# Patient Record
Sex: MALE | Race: ASIAN | NOT HISPANIC OR LATINO | URBAN - METROPOLITAN AREA
[De-identification: names, ages, dates, MRNs, and addresses within clinical notes are randomized per-mention and may not be internally consistent; named-entity substitution may affect disease eponyms.]

---

## 2021-09-02 ENCOUNTER — INPATIENT (INPATIENT)
Facility: HOSPITAL | Age: 71
LOS: 1 days | Discharge: ANOTHER HOSPITAL | End: 2021-09-04
Attending: INTERNAL MEDICINE | Admitting: INTERNAL MEDICINE
Payer: COMMERCIAL

## 2021-09-02 VITALS
TEMPERATURE: 98 F | OXYGEN SATURATION: 100 % | HEART RATE: 78 BPM | RESPIRATION RATE: 16 BRPM | SYSTOLIC BLOOD PRESSURE: 177 MMHG | DIASTOLIC BLOOD PRESSURE: 92 MMHG | WEIGHT: 151.68 LBS

## 2021-09-02 DIAGNOSIS — I21.4 NON-ST ELEVATION (NSTEMI) MYOCARDIAL INFARCTION: ICD-10-CM

## 2021-09-02 DIAGNOSIS — R07.9 CHEST PAIN, UNSPECIFIED: ICD-10-CM

## 2021-09-02 DIAGNOSIS — Z29.9 ENCOUNTER FOR PROPHYLACTIC MEASURES, UNSPECIFIED: ICD-10-CM

## 2021-09-02 DIAGNOSIS — E11.9 TYPE 2 DIABETES MELLITUS WITHOUT COMPLICATIONS: ICD-10-CM

## 2021-09-02 DIAGNOSIS — Z98.49 CATARACT EXTRACTION STATUS, UNSPECIFIED EYE: Chronic | ICD-10-CM

## 2021-09-02 LAB
ALBUMIN SERPL ELPH-MCNC: 4.1 G/DL — SIGNIFICANT CHANGE UP (ref 3.3–5)
ALP SERPL-CCNC: 68 U/L — SIGNIFICANT CHANGE UP (ref 40–120)
ALT FLD-CCNC: 29 U/L — SIGNIFICANT CHANGE UP (ref 4–41)
ANION GAP SERPL CALC-SCNC: 14 MMOL/L — SIGNIFICANT CHANGE UP (ref 7–14)
APPEARANCE UR: CLEAR — SIGNIFICANT CHANGE UP
APTT BLD: 100.2 SEC — HIGH (ref 27–36.3)
APTT BLD: 31.1 SEC — SIGNIFICANT CHANGE UP (ref 27–36.3)
AST SERPL-CCNC: 49 U/L — HIGH (ref 4–40)
BACTERIA # UR AUTO: NEGATIVE — SIGNIFICANT CHANGE UP
BASOPHILS # BLD AUTO: 0.03 K/UL — SIGNIFICANT CHANGE UP (ref 0–0.2)
BASOPHILS NFR BLD AUTO: 0.3 % — SIGNIFICANT CHANGE UP (ref 0–2)
BILIRUB SERPL-MCNC: 1.3 MG/DL — HIGH (ref 0.2–1.2)
BILIRUB UR-MCNC: NEGATIVE — SIGNIFICANT CHANGE UP
BUN SERPL-MCNC: 15 MG/DL — SIGNIFICANT CHANGE UP (ref 7–23)
CALCIUM SERPL-MCNC: 9.2 MG/DL — SIGNIFICANT CHANGE UP (ref 8.4–10.5)
CHLORIDE SERPL-SCNC: 98 MMOL/L — SIGNIFICANT CHANGE UP (ref 98–107)
CK MB BLD-MCNC: 10.8 % — HIGH (ref 0–2.5)
CK MB BLD-MCNC: 11.4 % — HIGH (ref 0–2.5)
CK MB CFR SERPL CALC: 19.9 NG/ML — HIGH
CK MB CFR SERPL CALC: 22.5 NG/ML — HIGH
CK SERPL-CCNC: 185 U/L — SIGNIFICANT CHANGE UP (ref 30–200)
CK SERPL-CCNC: 197 U/L — SIGNIFICANT CHANGE UP (ref 30–200)
CO2 SERPL-SCNC: 23 MMOL/L — SIGNIFICANT CHANGE UP (ref 22–31)
COLOR SPEC: SIGNIFICANT CHANGE UP
CREAT SERPL-MCNC: 0.94 MG/DL — SIGNIFICANT CHANGE UP (ref 0.5–1.3)
DIFF PNL FLD: NEGATIVE — SIGNIFICANT CHANGE UP
EOSINOPHIL # BLD AUTO: 0.06 K/UL — SIGNIFICANT CHANGE UP (ref 0–0.5)
EOSINOPHIL NFR BLD AUTO: 0.6 % — SIGNIFICANT CHANGE UP (ref 0–6)
EPI CELLS # UR: 0 /HPF — SIGNIFICANT CHANGE UP (ref 0–5)
GLUCOSE SERPL-MCNC: 178 MG/DL — HIGH (ref 70–99)
GLUCOSE UR QL: NEGATIVE — SIGNIFICANT CHANGE UP
HCT VFR BLD CALC: 49.3 % — SIGNIFICANT CHANGE UP (ref 39–50)
HGB BLD-MCNC: 16.3 G/DL — SIGNIFICANT CHANGE UP (ref 13–17)
HYALINE CASTS # UR AUTO: 1 /LPF — SIGNIFICANT CHANGE UP (ref 0–7)
IANC: 4.8 K/UL — SIGNIFICANT CHANGE UP (ref 1.5–8.5)
IMM GRANULOCYTES NFR BLD AUTO: 0.3 % — SIGNIFICANT CHANGE UP (ref 0–1.5)
INR BLD: 1.03 RATIO — SIGNIFICANT CHANGE UP (ref 0.88–1.16)
KETONES UR-MCNC: NEGATIVE — SIGNIFICANT CHANGE UP
LEUKOCYTE ESTERASE UR-ACNC: NEGATIVE — SIGNIFICANT CHANGE UP
LIDOCAIN IGE QN: 35 U/L — SIGNIFICANT CHANGE UP (ref 7–60)
LYMPHOCYTES # BLD AUTO: 3.7 K/UL — HIGH (ref 1–3.3)
LYMPHOCYTES # BLD AUTO: 39.8 % — SIGNIFICANT CHANGE UP (ref 13–44)
MCHC RBC-ENTMCNC: 25.5 PG — LOW (ref 27–34)
MCHC RBC-ENTMCNC: 33.1 GM/DL — SIGNIFICANT CHANGE UP (ref 32–36)
MCV RBC AUTO: 77.3 FL — LOW (ref 80–100)
MONOCYTES # BLD AUTO: 0.67 K/UL — SIGNIFICANT CHANGE UP (ref 0–0.9)
MONOCYTES NFR BLD AUTO: 7.2 % — SIGNIFICANT CHANGE UP (ref 2–14)
NEUTROPHILS # BLD AUTO: 4.8 K/UL — SIGNIFICANT CHANGE UP (ref 1.8–7.4)
NEUTROPHILS NFR BLD AUTO: 51.8 % — SIGNIFICANT CHANGE UP (ref 43–77)
NITRITE UR-MCNC: NEGATIVE — SIGNIFICANT CHANGE UP
NRBC # BLD: 0 /100 WBCS — SIGNIFICANT CHANGE UP
NRBC # FLD: 0 K/UL — SIGNIFICANT CHANGE UP
NT-PROBNP SERPL-SCNC: 324 PG/ML — HIGH
PH UR: 6 — SIGNIFICANT CHANGE UP (ref 5–8)
PLATELET # BLD AUTO: 332 K/UL — SIGNIFICANT CHANGE UP (ref 150–400)
POTASSIUM SERPL-MCNC: 5.5 MMOL/L — HIGH (ref 3.5–5.3)
POTASSIUM SERPL-SCNC: 5.5 MMOL/L — HIGH (ref 3.5–5.3)
PROT SERPL-MCNC: 8.3 G/DL — SIGNIFICANT CHANGE UP (ref 6–8.3)
PROT UR-MCNC: ABNORMAL
PROTHROM AB SERPL-ACNC: 11.8 SEC — SIGNIFICANT CHANGE UP (ref 10.6–13.6)
RBC # BLD: 6.38 M/UL — HIGH (ref 4.2–5.8)
RBC # FLD: 14.9 % — HIGH (ref 10.3–14.5)
RBC CASTS # UR COMP ASSIST: 3 /HPF — SIGNIFICANT CHANGE UP (ref 0–4)
SARS-COV-2 RNA SPEC QL NAA+PROBE: SIGNIFICANT CHANGE UP
SODIUM SERPL-SCNC: 135 MMOL/L — SIGNIFICANT CHANGE UP (ref 135–145)
SP GR SPEC: 1.01 — SIGNIFICANT CHANGE UP (ref 1–1.05)
TROPONIN T, HIGH SENSITIVITY RESULT: 329 NG/L — CRITICAL HIGH
TROPONIN T, HIGH SENSITIVITY RESULT: 545 NG/L — CRITICAL HIGH
TROPONIN T, HIGH SENSITIVITY RESULT: 962 NG/L — CRITICAL HIGH
UROBILINOGEN FLD QL: SIGNIFICANT CHANGE UP
WBC # BLD: 9.29 K/UL — SIGNIFICANT CHANGE UP (ref 3.8–10.5)
WBC # FLD AUTO: 9.29 K/UL — SIGNIFICANT CHANGE UP (ref 3.8–10.5)
WBC UR QL: 0 /HPF — SIGNIFICANT CHANGE UP (ref 0–5)

## 2021-09-02 PROCEDURE — 99223 1ST HOSP IP/OBS HIGH 75: CPT

## 2021-09-02 PROCEDURE — 93010 ELECTROCARDIOGRAM REPORT: CPT

## 2021-09-02 PROCEDURE — 71046 X-RAY EXAM CHEST 2 VIEWS: CPT | Mod: 26

## 2021-09-02 PROCEDURE — 99291 CRITICAL CARE FIRST HOUR: CPT | Mod: 25

## 2021-09-02 RX ORDER — ASPIRIN/CALCIUM CARB/MAGNESIUM 324 MG
81 TABLET ORAL DAILY
Refills: 0 | Status: DISCONTINUED | OUTPATIENT
Start: 2021-09-03 | End: 2021-09-04

## 2021-09-02 RX ORDER — ASPIRIN/CALCIUM CARB/MAGNESIUM 324 MG
162 TABLET ORAL ONCE
Refills: 0 | Status: COMPLETED | OUTPATIENT
Start: 2021-09-02 | End: 2021-09-02

## 2021-09-02 RX ORDER — ASPIRIN/CALCIUM CARB/MAGNESIUM 324 MG
162 TABLET ORAL ONCE
Refills: 0 | Status: DISCONTINUED | OUTPATIENT
Start: 2021-09-02 | End: 2021-09-02

## 2021-09-02 RX ORDER — DEXTROSE 50 % IN WATER 50 %
12.5 SYRINGE (ML) INTRAVENOUS ONCE
Refills: 0 | Status: DISCONTINUED | OUTPATIENT
Start: 2021-09-02 | End: 2021-09-04

## 2021-09-02 RX ORDER — HEPARIN SODIUM 5000 [USP'U]/ML
600 INJECTION INTRAVENOUS; SUBCUTANEOUS
Qty: 25000 | Refills: 0 | Status: DISCONTINUED | OUTPATIENT
Start: 2021-09-02 | End: 2021-09-03

## 2021-09-02 RX ORDER — HEPARIN SODIUM 5000 [USP'U]/ML
4100 INJECTION INTRAVENOUS; SUBCUTANEOUS ONCE
Refills: 0 | Status: COMPLETED | OUTPATIENT
Start: 2021-09-02 | End: 2021-09-02

## 2021-09-02 RX ORDER — SODIUM CHLORIDE 9 MG/ML
1000 INJECTION, SOLUTION INTRAVENOUS
Refills: 0 | Status: DISCONTINUED | OUTPATIENT
Start: 2021-09-02 | End: 2021-09-04

## 2021-09-02 RX ORDER — DEXTROSE 50 % IN WATER 50 %
15 SYRINGE (ML) INTRAVENOUS ONCE
Refills: 0 | Status: DISCONTINUED | OUTPATIENT
Start: 2021-09-02 | End: 2021-09-04

## 2021-09-02 RX ORDER — ATORVASTATIN CALCIUM 80 MG/1
80 TABLET, FILM COATED ORAL AT BEDTIME
Refills: 0 | Status: DISCONTINUED | OUTPATIENT
Start: 2021-09-02 | End: 2021-09-04

## 2021-09-02 RX ORDER — HEPARIN SODIUM 5000 [USP'U]/ML
INJECTION INTRAVENOUS; SUBCUTANEOUS
Qty: 25000 | Refills: 0 | Status: DISCONTINUED | OUTPATIENT
Start: 2021-09-02 | End: 2021-09-02

## 2021-09-02 RX ORDER — DEXTROSE 50 % IN WATER 50 %
25 SYRINGE (ML) INTRAVENOUS ONCE
Refills: 0 | Status: DISCONTINUED | OUTPATIENT
Start: 2021-09-02 | End: 2021-09-04

## 2021-09-02 RX ORDER — INSULIN LISPRO 100/ML
VIAL (ML) SUBCUTANEOUS AT BEDTIME
Refills: 0 | Status: DISCONTINUED | OUTPATIENT
Start: 2021-09-02 | End: 2021-09-04

## 2021-09-02 RX ORDER — HEPARIN SODIUM 5000 [USP'U]/ML
4100 INJECTION INTRAVENOUS; SUBCUTANEOUS EVERY 6 HOURS
Refills: 0 | Status: DISCONTINUED | OUTPATIENT
Start: 2021-09-02 | End: 2021-09-03

## 2021-09-02 RX ORDER — TICAGRELOR 90 MG/1
180 TABLET ORAL ONCE
Refills: 0 | Status: COMPLETED | OUTPATIENT
Start: 2021-09-02 | End: 2021-09-02

## 2021-09-02 RX ORDER — TICAGRELOR 90 MG/1
90 TABLET ORAL EVERY 12 HOURS
Refills: 0 | Status: DISCONTINUED | OUTPATIENT
Start: 2021-09-02 | End: 2021-09-03

## 2021-09-02 RX ORDER — GLUCAGON INJECTION, SOLUTION 0.5 MG/.1ML
1 INJECTION, SOLUTION SUBCUTANEOUS ONCE
Refills: 0 | Status: DISCONTINUED | OUTPATIENT
Start: 2021-09-02 | End: 2021-09-04

## 2021-09-02 RX ORDER — INSULIN LISPRO 100/ML
VIAL (ML) SUBCUTANEOUS
Refills: 0 | Status: DISCONTINUED | OUTPATIENT
Start: 2021-09-02 | End: 2021-09-04

## 2021-09-02 RX ORDER — CARVEDILOL PHOSPHATE 80 MG/1
3.12 CAPSULE, EXTENDED RELEASE ORAL EVERY 12 HOURS
Refills: 0 | Status: DISCONTINUED | OUTPATIENT
Start: 2021-09-02 | End: 2021-09-04

## 2021-09-02 RX ADMIN — HEPARIN SODIUM 800 UNIT(S)/HR: 5000 INJECTION INTRAVENOUS; SUBCUTANEOUS at 11:35

## 2021-09-02 RX ADMIN — Medication 162 MILLIGRAM(S): at 10:12

## 2021-09-02 RX ADMIN — TICAGRELOR 90 MILLIGRAM(S): 90 TABLET ORAL at 22:02

## 2021-09-02 RX ADMIN — ATORVASTATIN CALCIUM 80 MILLIGRAM(S): 80 TABLET, FILM COATED ORAL at 22:02

## 2021-09-02 RX ADMIN — TICAGRELOR 180 MILLIGRAM(S): 90 TABLET ORAL at 11:35

## 2021-09-02 RX ADMIN — HEPARIN SODIUM 4100 UNIT(S): 5000 INJECTION INTRAVENOUS; SUBCUTANEOUS at 11:30

## 2021-09-02 RX ADMIN — HEPARIN SODIUM 600 UNIT(S)/HR: 5000 INJECTION INTRAVENOUS; SUBCUTANEOUS at 23:24

## 2021-09-02 RX ADMIN — Medication 1: at 17:59

## 2021-09-02 RX ADMIN — HEPARIN SODIUM 0 UNIT(S)/HR: 5000 INJECTION INTRAVENOUS; SUBCUTANEOUS at 20:03

## 2021-09-02 RX ADMIN — CARVEDILOL PHOSPHATE 3.12 MILLIGRAM(S): 80 CAPSULE, EXTENDED RELEASE ORAL at 20:18

## 2021-09-02 RX ADMIN — Medication 162 MILLIGRAM(S): at 11:35

## 2021-09-02 NOTE — H&P ADULT - ATTENDING COMMENTS
71 year old man w/ no past medical history p/w substernal, non-radiation, pressure like chest pain not related to exertion. Patient w/o SOB or diaphoresis. Patient troponin up-trending 329-> 545, EKG w/ TWI lateral leads. S/p aspirin load. Patient also s/p brilinta and heparin ggt. Cardiology consulted for further management of NSTEMI will likely need cardiac cath. Patient admitted for NSTEMI management.     #NSTEMI   - trend troponins to peak  - c/w asa brilinta, hep ggt, statin, coreg   - obtain TTE   - cath per cards   - lipid profile, TSH, A1C       Rest of Plan as Detailed Above

## 2021-09-02 NOTE — CONSULT NOTE ADULT - ASSESSMENT
72yo M with no known PMH who presents with chest pain, found to have troponin of 329, admitted for NSTEMI.     #NSTEMI   #Hypertension - SBP 170s, repeat 150s    Plan:  -Trop 329, ECG w/ TWI in lateral leads; pt now CP free  -Loaded with asa, brilinta and started heparin gtt by ED  -Start Atorvastatin 80mg now  -If HR>65, would start Coreg  -obtain ECHO; based on findings will pursue stress vs cath   -trend troponin to peak (add on CK&CKMB as well)  -risk stratify with lipid panel, hemoglobin a1c, TSH  -admit to telemetry     Sendy Ng MD  Cardiology Fellow - PGY 4  For all New Consults and Questions:  [email or web address]   Login: [email or web address] 70yo M with no known PMH who presents with chest pain, found to have troponin of 329, admitted for NSTEMI.     #NSTEMI   #Hypertension - SBP 170s, repeat 150s    Plan:  -Trop 329, ECG w/ TWI in lateral leads; pt now CP free  -Loaded with asa, brilinta and started heparin gtt by ED  -Start Atorvastatin 80mg now; continue asa 81mg qd + brilinta 90mg BID  -If HR>65, would start Coreg  -obtain ECHO; based on findings will pursue stress vs cath   -trend troponin to peak (add on CK&CKMB as well)  -risk stratify with lipid panel, hemoglobin a1c, TSH  -admit to telemetry     Sendy Ng MD  Cardiology Fellow - PGY 4  For all New Consults and Questions:  [email or web address]   Login: [email or web address] 72yo M with no known PMH who presents with chest pain, found to have troponin of 329, admitted for NSTEMI.     #NSTEMI - VALORIE 3, ABILIO 101   #Hypertension - SBP 170s, repeat 150s    Plan:  -Trop 329, ECG w/ TWI in lateral leads; pt now CP free  -Loaded with asa, brilinta and started heparin gtt by ED  -Start Atorvastatin 80mg now; continue asa 81mg qd + brilinta 90mg BID  -If HR>65, would start Coreg  -obtain ECHO; based on findings will pursue stress vs cath   -trend troponin to peak (add on CK&CKMB as well)  -risk stratify with lipid panel, hemoglobin a1c, TSH  -admit to telemetry     Sendy Ng MD  Cardiology Fellow - PGY 4  For all New Consults and Questions:  [email or web address]   Login: [email or web address] 72yo M with no known PMH who presents with chest pain, found to have troponin of 329, admitted for NSTEMI.     #NSTEMI - VALORIE 3, ABILIO 101   #Hypertension - SBP 170s, repeat 150s    Plan:  -Trop 329, ECG w/ TWI in lateral leads; pt now CP free  -Loaded with asa, brilinta and started heparin gtt by ED  -Start Atorvastatin 80mg now; continue asa 81mg qd + brilinta 90mg BID  -If HR>65, would start Coreg  -obtain ECHO  -plan for cath  -trend troponin to peak (add on CK&CKMB as well)  -risk stratify with lipid panel, hemoglobin a1c, TSH  -admit to telemetry     Sendy Ng MD  Cardiology Fellow - PGY 4  For all New Consults and Questions:  [email or web address]   Login: [email or web address]

## 2021-09-02 NOTE — ED PROVIDER NOTE - PROGRESS NOTE DETAILS
Alejandro Kim, DO PGY-2: Pt found to have elevated trop 320, started heparin, brilinta, aspirin, as per cardiology 4 hour repeat trop, ck, ckmb, admit to medicine.

## 2021-09-02 NOTE — H&P ADULT - ASSESSMENT
70 y/o male w/ no reported pmhx presents w/ 2 episodes of unprovoked chest pain since this morning. Patient being admitted for suspected NSTEMI. 72 y/o male w/ no reported pmhx presents w/ 2 episodes of unprovoked chest pain since this morning found to have elevated cardiac enzymes with abnormal EKG, admitted to tele for NSTEMI.

## 2021-09-02 NOTE — H&P ADULT - PROBLEM SELECTOR PLAN 2
- Pt already on heparin, ASA, brilinta.  - Educated pt on importance of going to PCP regularly. Daily glucose monitoring  insulin sliding scale  DASH 1800 ADA diet   serum HgA1C  -diabetic education

## 2021-09-02 NOTE — ED PROVIDER NOTE - PHYSICAL EXAMINATION
VITALS:   T(C): 36.6 (09-02-21 @ 09:46), Max: 36.6 (09-02-21 @ 09:46)  HR: 74 (09-02-21 @ 09:46) (74 - 78)  BP: 174/93 (09-02-21 @ 09:46) (174/93 - 177/92)  RR: 17 (09-02-21 @ 09:46) (16 - 17)  SpO2: 100% (09-02-21 @ 09:46) (100% - 100%)    GENERAL: NAD, lying in bed comfortably  HEAD:  Atraumatic, normocephalic  EYES: EOMI, PERRLA, conjunctiva and sclera clear  ENT: Moist mucous membranes  NECK: Supple, no JVD, no carotid bruit  HEART: Regular rate and rhythm, no murmurs, rubs, or gallops  LUNGS: Unlabored respirations. Clear to auscultation bilaterally, no crackles, wheezing, or rhonchi  ABDOMEN: Soft, nontender, nondistended, +BS  EXTREMITIES: 2+ peripheral pulses bilaterally. No clubbing, cyanosis, or edema  NERVOUS SYSTEM:  A&Ox3, no focal deficits   SKIN: No rashes or lesions Alejandro Kim,  PGY-2:   CONSTITUTIONAL: well-appearing, in NAD  SKIN: Warm dry, normal skin turgor  HEAD: NCAT  NECK: Supple; non tender. Full ROM.  CARD: irregularly irregular, no murmurs.  RESP: clear to ausculation b/l. No crackles or wheezing.  ABD: soft, non-tender, non-distended, no rebound or guarding.  EXT: Full ROM, no bony tenderness, no pedal edema, no calf tenderness  PSYCH: Cooperative, appropriate.

## 2021-09-02 NOTE — ED PROVIDER NOTE - NS ED ROS FT
REVIEW OF SYSTEMS:    CONSTITUTIONAL: No weakness, fevers or chills  EYES/ENT: No visual changes;  No vertigo   NECK: No pain   RESPIRATORY: No cough, wheezing; No shortness of breath  CARDIOVASCULAR: No chest pain or palpitations  GASTROINTESTINAL: No abdominal or epigastric pain. No nausea, vomiting; No diarrhea or constipation.  GENITOURINARY: No dysuria, frequency or hematuria  NEUROLOGICAL: No numbness or weakness  SKIN: No itching, rashes

## 2021-09-02 NOTE — H&P ADULT - HISTORY OF PRESENT ILLNESS
72 y/o male w/ no reported pmhx presents w/ 2 episodes of unprovoked chest pain since this morning. Pt states that he woke up to go to work and started to experience a numb, dull substernal pain that was not constant and rated a 4-5/10. Describes it as having to burb but cannot. Pt called his niece who took his BP, which he stated was abnormal, and his niece advised him to come to the hospital. Pt is not currently in any pain. Denies any HA, LOC, fever, chills, diaphoresis, weight changes, cough, SOB, palpitations, n/v/d/c, melena, hematochezia, GERD, numbness or tingling of extremities, and any weakness in extremities.   72 y/o male w/ NO reported pmhx (hasn't seen PCP in 6 yrs) presents w/ 2 episodes of unprovoked chest pain since this morning. Pt states that he woke up to go to work and started to experience a numb, dull substernal pain that was not constant and rated a 4-5/10. Describes it as having to belch but couldn't. Chest pain last for about 20 mins and resolved spontaneously. Pt called his niece who took his BP, which he stated was "high," (pt didn't how high,) and his niece advised him to come to the hospital. Pt is not currently in any pain on admission Denies any HA, LOC, fever, chills, diaphoresis, weight changes, cough, SOB, palpitations, n/v/d/c, melena, hematochezia, GERD, numbness or tingling of extremities, and any weakness in extremities.

## 2021-09-02 NOTE — H&P ADULT - PROBLEM SELECTOR PLAN 1
- Troponin T, High Sensitivity Result: 329. Continue to trend q6h w/ CK-MB and LDH.  - EKG: NSR @84 bpm. TWI in AVL, V5, V6. Repeat EKG.  - O2 Sat 100% RA  - Consulted Cardiology. Going for cardiac catheterization.   - Continue Heparin, Aspirin, atorvastatin, Brilinta  - Start Coreg 3.125 mg PO q12h  - Consider giving pt O2 if pt complains of SOB or difficulty breathing.  - Consider SL nitro if pt complains of chest pain and SBP >90 bpm  - Order echo, lipid panel, A1c  - Admit to telemetry tele monitor   Continue to trend cardiac enzymes: #3 at 9pm  Serial EKGs  DASH 1800 ADA diet  - O2 Sat 100% RA  - Consulted Cardiology. Plan for cardiac catheterization, likely tomorrow.  - Continue Heparin drip (monitor APTT,) Aspirin 81mg, Brilinta 90mg BID  - Start Coreg 3.125 mg PO q12h and atorvastatin 80mg qhs  - Consider giving pt O2 if pt complains of SOB or difficulty breathing.  - Consider SL nitro if pt complains of chest pain and SBP >90 bpm  - Order echo, lipid panel, A1c

## 2021-09-02 NOTE — ED PROVIDER NOTE - ATTENDING CONTRIBUTION TO CARE
Pt p/w chest pain and elevated troponin- plan for admission for cadiac w/u- heparin, asa, brillinta, EKG showing TWI latera leads   Upon my evaluation, this patient had a high probability of imminent or life-threatening deterioration due to concern for NSTEMI, which required my direct attention, intervention, and personal management.  The patient has a  medical condition that impairs one or more vital organ systems.  Frequent personal assessment and adjustment of medical interventions was performed.       I have personally provided 35 minutes of critical care time exclusive of time spent on separately billable procedures. Time includes review of laboratory data, radiology results, discussion with consultants, patient and family; monitoring for potential decompensation, as well as time spent retrieving data and reviewing the chart and documenting the visit. Interventions were performed as documented above.

## 2021-09-02 NOTE — H&P ADULT - NSHPLABSRESULTS_GEN_ALL_CORE
16.3   9.29  )-----------( 332      ( 02 Sep 2021 10:13 )             49.3         135  |  98  |  15  ----------------------------<  178<H>  5.5<H>   |  23  |  0.94    Ca    9.2      02 Sep 2021 10:13    TPro  8.3  /  Alb  4.1  /  TBili  1.3<H>  /  DBili  x   /  AST  49<H>  /  ALT  29  /  AlkPhos  68        Urinalysis Basic - ( 02 Sep 2021 15:16 )    Color: Light Yellow / Appearance: Clear / S.013 / pH: x  Gluc: x / Ketone: Negative  / Bili: Negative / Urobili: <2 mg/dL   Blood: x / Protein: 30 mg/dL / Nitrite: Negative   Leuk Esterase: Negative / RBC: 3 /HPF / WBC 0 /HPF   Sq Epi: x / Non Sq Epi: 0 /HPF / Bacteria: Negative      PT/INR - ( 02 Sep 2021 11:14 )   PT: 11.8 sec;   INR: 1.03 ratio         PTT - ( 02 Sep 2021 11:14 )  PTT:31.1 sec  LIVER FUNCTIONS - ( 02 Sep 2021 10:13 )  Alb: 4.1 g/dL / Pro: 8.3 g/dL / ALK PHOS: 68 U/L / ALT: 29 U/L / AST: 49 U/L / GGT: x           CAPILLARY BLOOD GLUCOSE      Troponin T, High Sensitivity Result: 329      Chest X-ray: Examination in frontal and lateral projection fails to show evidence of any active pulmonary disease. The heart is not enlarged and there is no pleural effusion or pneumothorax. There is no acute bone pathology.      EKG: NSR @84 bpm. TWI in AVL, V5, V6 EKG: NSR @84 bpm. TWI in AVL, V5, V6  Trops: 324-->545, CKMB 22.5/               16.3   9.29  )-----------( 332      ( 02 Sep 2021 10:13 )             49.3         135  |  98  |  15  ----------------------------<  178<H>  5.5<H>   |  23  |  0.94    Ca    9.2      02 Sep 2021 10:13    TPro  8.3  /  Alb  4.1  /  TBili  1.3<H>  /  DBili  x   /  AST  49<H>  /  ALT  29  /  AlkPhos  68  02      Urinalysis Basic - ( 02 Sep 2021 15:16 )    Color: Light Yellow / Appearance: Clear / S.013 / pH: x  Gluc: x / Ketone: Negative  / Bili: Negative / Urobili: <2 mg/dL   Blood: x / Protein: 30 mg/dL / Nitrite: Negative   Leuk Esterase: Negative / RBC: 3 /HPF / WBC 0 /HPF   Sq Epi: x / Non Sq Epi: 0 /HPF / Bacteria: Negative      PT/INR - ( 02 Sep 2021 11:14 )   PT: 11.8 sec;   INR: 1.03 ratio         PTT - ( 02 Sep 2021 11:14 )  PTT:31.1 sec  LIVER FUNCTIONS - ( 02 Sep 2021 10:13 )  Alb: 4.1 g/dL / Pro: 8.3 g/dL / ALK PHOS: 68 U/L / ALT: 29 U/L / AST: 49 U/L / GGT: x           Chest X-ray: Examination in frontal and lateral projection fails to show evidence of any active pulmonary disease. The heart is not enlarged and there is no pleural effusion or pneumothorax. There is no acute bone pathology.

## 2021-09-02 NOTE — ED PROVIDER NOTE - OBJECTIVE STATEMENT
72 yo M w/ no known PMHx presenting with one episode of chest pain this morning. This morning he experienced a 30 minute episode of dull mid-sternal 4/10 chest pain, without radiation, w/o n/v, and w/o diaphoresis. He does not think anything provoked it and was not stressed or exerting himself prior to the pain. He denies chest pain after meals. He has never experienced the pain before, took no medications, but was concerned and advised by relative to come to the ER. He has not seen a doctor in over 5 years. 70 yo M w/ no known PMHx presenting with one episode of chest pain this morning. This morning he experienced a 30 minute episode of dull mid-sternal 4/10 chest pain, without radiation, w/o n/v, and w/o diaphoresis. He does not think anything provoked it and was not stressed or exerting himself prior to the pain. He denies chest pain after meals. He has never experienced the pain before, took no medications, but was concerned and advised by relative to come to the ER. He has not seen a doctor in over 5 years. Not covid vaccinated.

## 2021-09-02 NOTE — ED ADULT NURSE REASSESSMENT NOTE - NS ED NURSE REASSESS COMMENT FT1
Pt denies sob or chest pain awaiting floor transport. pt on heparin drip held at this time per heparin ACS  protocol , report endorsed over to floor nurse on 7th floor who will adjust heparin  drip after one hour.

## 2021-09-02 NOTE — H&P ADULT - NEUROLOGICAL DETAILS
alert and oriented x 3/responds to verbal commands/sensation intact/normal strength alert and oriented x 3/responds to pain/responds to verbal commands/sensation intact/normal strength

## 2021-09-02 NOTE — ED ADULT NURSE NOTE - OBJECTIVE STATEMENT
Pt awake and alert x 4 states had episode of chest pain earlier that now has subsided. Pt now denies chest pain or sob. Pt is NSR on cardiac monitor b/p elevated. Pt denies any dizziness or nausea. iv placed blood and covid swab collected. pt await dispo.

## 2021-09-02 NOTE — CONSULT NOTE ADULT - ATTENDING COMMENTS
Personally seen and examined patient  labs and vitals reviewed  agree with above assessment and plan  CP with CE and EKG concerning for NSTEMI  cont hep drip, dapt, statin  TTE pending  plan for cath  monitor on tele  will cont to follow  if cp worsens or becomes HD unstable, call cardiology stat

## 2021-09-02 NOTE — ED ADULT TRIAGE NOTE - CHIEF COMPLAINT QUOTE
pt c/o midsternal, non radiating chest pain as of last night. pt denies SOB, fever, cough, n/v. pt denies past medical hx, states he has not seen MD in awhile.

## 2021-09-02 NOTE — ED PROVIDER NOTE - CLINICAL SUMMARY MEDICAL DECISION MAKING FREE TEXT BOX
72 yo [facility] presenting w. one day of acute dull chest pain without radiation, N/V, or diaphoresis. Patient currently without chest pain. EKG showing non-specific T-wave abnormality. Given patient risk factors differential includes coronary/anginal syndrome vs costochondritis.

## 2021-09-02 NOTE — H&P ADULT - NSHPSOCIALHISTORY_GEN_ALL_CORE
Pt is a single man. Ambulates w/ no assistive devices. Describes diet as healthy. 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 Reports no alcohol use or drug use. Pt states that he used to smoke socially about 40-50 years ago but has not smoked since.

## 2021-09-03 ENCOUNTER — TRANSCRIPTION ENCOUNTER (OUTPATIENT)
Age: 71
End: 2021-09-03

## 2021-09-03 DIAGNOSIS — I10 ESSENTIAL (PRIMARY) HYPERTENSION: ICD-10-CM

## 2021-09-03 DIAGNOSIS — E11.9 TYPE 2 DIABETES MELLITUS WITHOUT COMPLICATIONS: ICD-10-CM

## 2021-09-03 DIAGNOSIS — E78.5 HYPERLIPIDEMIA, UNSPECIFIED: ICD-10-CM

## 2021-09-03 LAB
A1C WITH ESTIMATED AVERAGE GLUCOSE RESULT: 9.2 % — HIGH (ref 4–5.6)
ANION GAP SERPL CALC-SCNC: 17 MMOL/L — HIGH (ref 7–14)
APTT BLD: 46.4 SEC — HIGH (ref 27–36.3)
APTT BLD: 48.1 SEC — HIGH (ref 27–36.3)
BUN SERPL-MCNC: 13 MG/DL — SIGNIFICANT CHANGE UP (ref 7–23)
CALCIUM SERPL-MCNC: 9 MG/DL — SIGNIFICANT CHANGE UP (ref 8.4–10.5)
CHLORIDE SERPL-SCNC: 100 MMOL/L — SIGNIFICANT CHANGE UP (ref 98–107)
CHOLEST SERPL-MCNC: 165 MG/DL — SIGNIFICANT CHANGE UP
CK MB BLD-MCNC: 10.7 % — HIGH (ref 0–2.5)
CK MB CFR SERPL CALC: 15.2 NG/ML — HIGH
CK SERPL-CCNC: 142 U/L — SIGNIFICANT CHANGE UP (ref 30–200)
CO2 SERPL-SCNC: 22 MMOL/L — SIGNIFICANT CHANGE UP (ref 22–31)
COVID-19 SPIKE DOMAIN AB INTERP: NEGATIVE — SIGNIFICANT CHANGE UP
COVID-19 SPIKE DOMAIN ANTIBODY RESULT: 0.4 U/ML — SIGNIFICANT CHANGE UP
CREAT SERPL-MCNC: 0.84 MG/DL — SIGNIFICANT CHANGE UP (ref 0.5–1.3)
ESTIMATED AVERAGE GLUCOSE: 217 — SIGNIFICANT CHANGE UP
GLUCOSE BLDC GLUCOMTR-MCNC: 119 MG/DL — HIGH (ref 70–99)
GLUCOSE BLDC GLUCOMTR-MCNC: 161 MG/DL — HIGH (ref 70–99)
GLUCOSE BLDC GLUCOMTR-MCNC: 228 MG/DL — HIGH (ref 70–99)
GLUCOSE SERPL-MCNC: 175 MG/DL — HIGH (ref 70–99)
HCT VFR BLD CALC: 45.3 % — SIGNIFICANT CHANGE UP (ref 39–50)
HCT VFR BLD CALC: 45.3 % — SIGNIFICANT CHANGE UP (ref 39–50)
HCV AB S/CO SERPL IA: 0.11 S/CO — SIGNIFICANT CHANGE UP (ref 0–0.99)
HCV AB SERPL-IMP: SIGNIFICANT CHANGE UP
HDLC SERPL-MCNC: 42 MG/DL — SIGNIFICANT CHANGE UP
HGB BLD-MCNC: 15 G/DL — SIGNIFICANT CHANGE UP (ref 13–17)
HGB BLD-MCNC: 15.2 G/DL — SIGNIFICANT CHANGE UP (ref 13–17)
LIPID PNL WITH DIRECT LDL SERPL: 101 MG/DL — HIGH
MCHC RBC-ENTMCNC: 25.3 PG — LOW (ref 27–34)
MCHC RBC-ENTMCNC: 25.6 PG — LOW (ref 27–34)
MCHC RBC-ENTMCNC: 33.1 GM/DL — SIGNIFICANT CHANGE UP (ref 32–36)
MCHC RBC-ENTMCNC: 33.6 GM/DL — SIGNIFICANT CHANGE UP (ref 32–36)
MCV RBC AUTO: 76.4 FL — LOW (ref 80–100)
MCV RBC AUTO: 76.5 FL — LOW (ref 80–100)
NON HDL CHOLESTEROL: 123 MG/DL — SIGNIFICANT CHANGE UP
NRBC # BLD: 0 /100 WBCS — SIGNIFICANT CHANGE UP
NRBC # BLD: 0 /100 WBCS — SIGNIFICANT CHANGE UP
NRBC # FLD: 0 K/UL — SIGNIFICANT CHANGE UP
NRBC # FLD: 0 K/UL — SIGNIFICANT CHANGE UP
PLATELET # BLD AUTO: 314 K/UL — SIGNIFICANT CHANGE UP (ref 150–400)
PLATELET # BLD AUTO: 342 K/UL — SIGNIFICANT CHANGE UP (ref 150–400)
POTASSIUM SERPL-MCNC: 4.4 MMOL/L — SIGNIFICANT CHANGE UP (ref 3.5–5.3)
POTASSIUM SERPL-SCNC: 4.4 MMOL/L — SIGNIFICANT CHANGE UP (ref 3.5–5.3)
RBC # BLD: 5.92 M/UL — HIGH (ref 4.2–5.8)
RBC # BLD: 5.93 M/UL — HIGH (ref 4.2–5.8)
RBC # FLD: 14.6 % — HIGH (ref 10.3–14.5)
RBC # FLD: 14.6 % — HIGH (ref 10.3–14.5)
SARS-COV-2 IGG+IGM SERPL QL IA: 0.4 U/ML — SIGNIFICANT CHANGE UP
SARS-COV-2 IGG+IGM SERPL QL IA: NEGATIVE — SIGNIFICANT CHANGE UP
SODIUM SERPL-SCNC: 139 MMOL/L — SIGNIFICANT CHANGE UP (ref 135–145)
TRIGL SERPL-MCNC: 109 MG/DL — SIGNIFICANT CHANGE UP
TROPONIN T, HIGH SENSITIVITY RESULT: 740 NG/L — CRITICAL HIGH
TSH SERPL-MCNC: 4.01 UIU/ML — SIGNIFICANT CHANGE UP (ref 0.27–4.2)
WBC # BLD: 9.24 K/UL — SIGNIFICANT CHANGE UP (ref 3.8–10.5)
WBC # BLD: 9.33 K/UL — SIGNIFICANT CHANGE UP (ref 3.8–10.5)
WBC # FLD AUTO: 9.24 K/UL — SIGNIFICANT CHANGE UP (ref 3.8–10.5)
WBC # FLD AUTO: 9.33 K/UL — SIGNIFICANT CHANGE UP (ref 3.8–10.5)

## 2021-09-03 PROCEDURE — 99233 SBSQ HOSP IP/OBS HIGH 50: CPT

## 2021-09-03 PROCEDURE — 99152 MOD SED SAME PHYS/QHP 5/>YRS: CPT | Mod: 59

## 2021-09-03 PROCEDURE — 93880 EXTRACRANIAL BILAT STUDY: CPT | Mod: 26

## 2021-09-03 PROCEDURE — 99223 1ST HOSP IP/OBS HIGH 75: CPT

## 2021-09-03 PROCEDURE — 93458 L HRT ARTERY/VENTRICLE ANGIO: CPT | Mod: 26

## 2021-09-03 RX ORDER — INSULIN LISPRO 100/ML
5 VIAL (ML) SUBCUTANEOUS
Refills: 0 | Status: DISCONTINUED | OUTPATIENT
Start: 2021-09-03 | End: 2021-09-04

## 2021-09-03 RX ORDER — INSULIN GLARGINE 100 [IU]/ML
15 INJECTION, SOLUTION SUBCUTANEOUS AT BEDTIME
Refills: 0 | Status: DISCONTINUED | OUTPATIENT
Start: 2021-09-03 | End: 2021-09-04

## 2021-09-03 RX ORDER — HEPARIN SODIUM 5000 [USP'U]/ML
4000 INJECTION INTRAVENOUS; SUBCUTANEOUS EVERY 6 HOURS
Refills: 0 | Status: DISCONTINUED | OUTPATIENT
Start: 2021-09-03 | End: 2021-09-04

## 2021-09-03 RX ORDER — HEPARIN SODIUM 5000 [USP'U]/ML
600 INJECTION INTRAVENOUS; SUBCUTANEOUS
Qty: 25000 | Refills: 0 | Status: DISCONTINUED | OUTPATIENT
Start: 2021-09-03 | End: 2021-09-04

## 2021-09-03 RX ADMIN — HEPARIN SODIUM 600 UNIT(S)/HR: 5000 INJECTION INTRAVENOUS; SUBCUTANEOUS at 15:17

## 2021-09-03 RX ADMIN — INSULIN GLARGINE 15 UNIT(S): 100 INJECTION, SOLUTION SUBCUTANEOUS at 21:23

## 2021-09-03 RX ADMIN — Medication 5 UNIT(S): at 17:05

## 2021-09-03 RX ADMIN — Medication 2: at 12:08

## 2021-09-03 RX ADMIN — CARVEDILOL PHOSPHATE 3.12 MILLIGRAM(S): 80 CAPSULE, EXTENDED RELEASE ORAL at 17:04

## 2021-09-03 RX ADMIN — CARVEDILOL PHOSPHATE 3.12 MILLIGRAM(S): 80 CAPSULE, EXTENDED RELEASE ORAL at 05:33

## 2021-09-03 RX ADMIN — ATORVASTATIN CALCIUM 80 MILLIGRAM(S): 80 TABLET, FILM COATED ORAL at 21:22

## 2021-09-03 RX ADMIN — Medication 81 MILLIGRAM(S): at 11:07

## 2021-09-03 RX ADMIN — Medication 1: at 17:05

## 2021-09-03 RX ADMIN — Medication 1: at 08:11

## 2021-09-03 NOTE — CONSULT NOTE ADULT - ASSESSMENT
71 year old man with no reported PMH (hasn't seen PCP in 6 yrs) presents with NSTEMI, found to have multi-vessel disease on cath and now awaiting transfer to [facility] for CT surgery evaluation. Consult called for evaluation of new onset DM2, patient found to have A1c of 9.2%. High risk patient with high level decision making, presenting with NSTEMI and at risk of further microvascular and macrovascular complications. BG goal 100-180 mg/dL.

## 2021-09-03 NOTE — PROGRESS NOTE ADULT - PROBLEM SELECTOR PLAN 1
# 3V CAD   - Trops 545->962->740   - Cath showed severe multivessel disease   - loaded with ASA and Brilinta in ED, d/c Brilinta as will need CABG  - c/w heparin gtt   - cardiology following   - c/w Coreg 3.125 mg PO q12h and atorvastatin 80mg qhs  [ ] echo

## 2021-09-03 NOTE — DISCHARGE NOTE PROVIDER - NSDCCPCAREPLAN_GEN_ALL_CORE_FT
PRINCIPAL DISCHARGE DIAGNOSIS  Diagnosis: NSTEMI (non-ST elevation myocardial infarction)  Assessment and Plan of Treatment: You were seen and evaluated by cardiology. You had a cardiac catheterization, which showed blockages in three vessels. You will be transferred to [facility] for CABG.      SECONDARY DISCHARGE DIAGNOSES  Diagnosis: DM2 (diabetes mellitus, type 2)  Assessment and Plan of Treatment: Your A1c was 9.2. Monitor finger sticks pre-meal and bedtime, low salt, fat and carbohydrate diet, minimize glucose intake.  Exercise daily for at least 30 minutes and weight loss.  Follow up with primary care physician and endocrinologist for routine Hemoglobin A1C checks and management.  Follow up with your ophthalmologist for routine yearly vision exams.     PRINCIPAL DISCHARGE DIAGNOSIS  Diagnosis: NSTEMI (non-ST elevation myocardial infarction)  Assessment and Plan of Treatment: You were seen and evaluated by cardiology. You had a cardiac catheterization, which showed blockages in three vessels. You will be transferred to [facility] for CABG.      SECONDARY DISCHARGE DIAGNOSES  Diagnosis: DM2 (diabetes mellitus, type 2)  Assessment and Plan of Treatment: Your A1c was 9.2.   Continue your medication regimen and a consistent carbohydrate diet (Meaning eating the same amount of carbohydrates at the same time each day). Monitor blood glucose levels throughout the day before meals and at bedtime. Record blood sugars and bring to outpatient providers appointment in order to be reviewed by your doctor for management modifications. If your sugars are more than 400 or less than 70 you should contact your PCP immediately. Monitor for signs/symptoms of low blood glucose, such as, dizziness, altered mental status, or cool/clammy skin. In addition, monitor for signs/symptoms of high blood glucose, such as, feeling hot, dry, fatigued, or with increased thirst/urination. Make regular podiatry appointments in order to have feet checked for wounds and uncontrolled toe nail growth to prevent infections, as well as, appointments with an ophthalmologist to monitor your vision.

## 2021-09-03 NOTE — DISCHARGE NOTE PROVIDER - NSDCFUADDAPPT_GEN_ALL_CORE_FT
Follow up with your primary care physician for further monitoring in 1-2 weeks. Please call to arrange appointment. If you do not have a PCP, you may establish care at address above.  Follow up with cardiology within 1-2 weeks of discharge.

## 2021-09-03 NOTE — CHART NOTE - NSCHARTNOTEFT_GEN_A_CORE
Reviewed result of cardiac enzymes- troponin noted to be trending  up 329> 545> 962.   Patient seen and  assessed at bedside. Patient denies chest pain, shortness of breath and palpitation. Patient is  currently on heparin drip. Stat EKG done which showed no change rom previous EKG. Will repeat cardiac enzymes in AM.  ICU Vital Signs Last 24 Hrs  T(C): 36.4 (02 Sep 2021 20:11), Max: 37.1 (02 Sep 2021 18:22)  T(F): 97.5 (02 Sep 2021 20:11), Max: 98.8 (02 Sep 2021 18:22)  HR: 73 (02 Sep 2021 20:11) (73 - 78)  BP: 146/90 (02 Sep 2021 20:11) (138/95 - 177/92)  RR: 18 (02 Sep 2021 20:11) (16 - 18)  SpO2: 98% (02 Sep 2021 20:11) (98% - 100%)
MAEVE REIDJ 71y Male s/p cath R radial site check    Dressing is clear/dry/intact.   Site is without hematoma or bleeding.   Vital Signs Last 24 Hrs  T(C): 36.3 (03 Sep 2021 11:29), Max: 37.1 (02 Sep 2021 18:22)  T(F): 97.4 (03 Sep 2021 11:29), Max: 98.8 (02 Sep 2021 18:22)  HR: 77 (03 Sep 2021 11:29) (70 - 78)  BP: 142/70 (03 Sep 2021 11:29) (118/84 - 146/90)  BP(mean): --  RR: 17 (03 Sep 2021 11:29) (17 - 18)  SpO2: 99% (03 Sep 2021 11:29) (97% - 99%)    Pulses palpable, cap refill <2 sec.   Patient denies pain, numbness, tingling, CP, SOB. VSS.   Will continue to monitor.    Kristel Botello PA-C  Department of Medicine  Pager [phone]
Plan for cath tomorrow morning. Make patient NPO after midnight.

## 2021-09-03 NOTE — PROGRESS NOTE ADULT - ASSESSMENT
72 y/o male w/ no reported pmhx presents w/ 2 episodes of unprovoked chest pain since this morning found to have elevated cardiac enzymes with abnormal EKG, admitted to tele for NSTEMI.

## 2021-09-03 NOTE — DISCHARGE NOTE PROVIDER - NSDCMRMEDTOKEN_GEN_ALL_CORE_FT
aspirin 81 mg oral delayed release tablet: 1 tab(s) orally once a day  atorvastatin 80 mg oral tablet: 1 tab(s) orally once a day (at bedtime)  carvedilol 3.125 mg oral tablet: 1 tab(s) orally every 12 hours  heparin: 4000 unit(s) injectable every 6 hours  heparin 100 units/mL-D5% intravenous solution: 6 milliliter(s) intravenous   insulin glargine: 15 unit(s) injectable once a day (at bedtime)  insulin lispro 100 units/mL injectable solution: 5 unit(s) injectable 3 times a day (before meals)  insulin lispro 100 units/mL injectable solution:  injectable: 0 Unit(s) if Glucose 0 - 250  1 Unit(s) if Glucose 251 - 300  2 Unit(s) if Glucose 301 - 350  3 Unit(s) if Glucose 351 - 400  4 Unit(s) if Glucose Greater Than 400  insulin lispro 100 units/mL injectable solution:  injectable: 1 Unit(s) if Glucose 151 - 200  2 Unit(s) if Glucose 201 - 250  3 Unit(s) if Glucose 251 - 300  4 Unit(s) if Glucose 301 - 350  5 Unit(s) if Glucose 351 - 400  6 Unit(s) if Glucose Greater Than 400

## 2021-09-03 NOTE — DISCHARGE NOTE PROVIDER - NSFOLLOWUPCLINICS_GEN_ALL_ED_FT
[facility] [place]  Internal Medicine  [address] [place] [address]  Phone: [phone]  Fax: [phone]

## 2021-09-03 NOTE — DISCHARGE NOTE PROVIDER - CARE PROVIDERS DIRECT ADDRESSES
,[email or web address]

## 2021-09-03 NOTE — CONSULT NOTE ADULT - SUBJECTIVE AND OBJECTIVE BOX
HPI:  Patient is a 71 year old man with no reported PMH (hasn't seen PCP in 6 yrs) presents with NSTEMI, found to have multi-vessel disease on cath and now awaiting transfer to [facility] for CT surgery evaluation. Consult called for evaluation of new onset DM2, patient found to have A1c of 9.2%. Patient seen at bedside this afternoon, reports that he does not have a prior history of DM. Does not take any medications for DM. He does not have blurry vision or polydipsia, reports that he has always had polyuria but this is unchanged. He has cataracts. No known retinopathy, does not have neuropathy and no known nephropathy. Diet wise, he does admit to drinking soda (coca cola) and juice.    PAST MEDICAL & SURGICAL HISTORY:  No pertinent past medical history    History of cataract surgery      FAMILY HISTORY:  no family history of DM    Social History:  no cigarette use  no alcohol use  lives alone     Outpatient Medications:  no medications for DM    MEDICATIONS  (STANDING):  aspirin enteric coated 81 milliGRAM(s) Oral daily  atorvastatin 80 milliGRAM(s) Oral at bedtime  carvedilol 3.125 milliGRAM(s) Oral every 12 hours  dextrose 40% Gel 15 Gram(s) Oral once  dextrose 5%. 1000 milliLiter(s) (50 mL/Hr) IV Continuous <Continuous>  dextrose 5%. 1000 milliLiter(s) (100 mL/Hr) IV Continuous <Continuous>  dextrose 50% Injectable 25 Gram(s) IV Push once  dextrose 50% Injectable 12.5 Gram(s) IV Push once  dextrose 50% Injectable 25 Gram(s) IV Push once  glucagon  Injectable 1 milliGRAM(s) IntraMuscular once  heparin  Infusion. 600 Unit(s)/Hr (6 mL/Hr) IV Continuous <Continuous>  insulin lispro (ADMELOG) corrective regimen sliding scale   SubCutaneous three times a day before meals  insulin lispro (ADMELOG) corrective regimen sliding scale   SubCutaneous at bedtime    MEDICATIONS  (PRN):  heparin   Injectable 4000 Unit(s) IV Push every 6 hours PRN For aPTT less than 40      Allergies  No Known Allergies    Review of Systems:  Constitutional: No fever  Eyes: No blurry vision  Neuro: No tremors  HEENT: No pain  Cardiovascular: No chest pain, palpitations  Respiratory: No SOB, no cough  GI: No nausea, vomiting, abdominal pain  : No dysuria  Skin: no rash  Endocrine: + polyuria, no polydipsia    ALL OTHER SYSTEMS REVIEWED AND NEGATIVE    PHYSICAL EXAM:  VITALS: T(C): 36.3 (09-03-21 @ 11:29)  T(F): 97.4 (09-03-21 @ 11:29), Max: 98.8 (09-02-21 @ 18:22)  HR: 77 (09-03-21 @ 11:29) (70 - 78)  BP: 142/70 (09-03-21 @ 11:29) (118/84 - 146/90)  RR:  (17 - 18)  SpO2:  (97% - 100%)  Wt(kg): --  GENERAL: NAD, well-developed  EYES: No proptosis, anicteric  HEENT:  Atraumatic, Normocephalic  THYROID: Normal size, no palpable nodules  RESPIRATORY: Clear to auscultation bilaterally; No rales, rhonchi, wheezing  CARDIOVASCULAR: Regular rate and rhythm; No murmurs; no peripheral edema  GI: Soft, nontender, non distended, normal bowel sounds  SKIN: Dry, intact, No ulcers on feet  MUSCULOSKELETAL: Full range of motion, normal strength  PSYCH: Alert and oriented x 3, reactive affect, euthymic mood     POCT Blood Glucose.: 228 mg/dL (09-03-21 @ 11:40)  POCT Blood Glucose.: 160 mg/dL (09-03-21 @ 07:41)  POCT Blood Glucose.: 148 mg/dL (09-02-21 @ 21:59)  POCT Blood Glucose.: 175 mg/dL (09-02-21 @ 17:49)                          15.0   9.24  )-----------( 342      ( 03 Sep 2021 07:38 )             45.3       09-03    139  |  100  |  13  ----------------------------<  175<H>  4.4   |  22  |  0.84    EGFR if : 101  EGFR if non : 87    Ca    9.0      09-03    TPro  8.3  /  Alb  4.1  /  TBili  1.3<H>  /  DBili  x   /  AST  49<H>  /  ALT  29  /  AlkPhos  68  09-02      Thyroid Function Tests:  09-03 @ 07:38 TSH 4.01 FreeT4 -- T3 -- Anti TPO -- Anti Thyroglobulin Ab -- TSI --      09-03 Chol 165 Direct LDL -- LDL calculated 101<H> HDL 42 Trig 109      HbA1c 9.2%          
Patient seen and evaluated at bedside    Chief Complaint: NSTEMI    HPI:  70yo male with no known PMH (not seen physician for 5 years, no home meds) who woke up with substernal, non-radiation, pressure like chest pain. No palpitations, SOB, diaphoresis. Brought himself to the ED. Initial workup showed trop 329 with ECG showing TWI in lateral leads. Patient CP free when seen in ED and BP SBP noted to be in 170s (repeat 150s). Loaded with ASA, brilinta, and started on heparin gtt by ED.       PMHx:   No pertinent past medical history        PSHx:   No significant past surgical history        Allergies:  No Known Allergies      Home Meds: As per admission medication reconcilliation.     Current Medications:   heparin   Injectable 4100 Unit(s) IV Push every 6 hours PRN  heparin  Infusion.  Unit(s)/Hr IV Continuous <Continuous>      FAMILY HISTORY: NC      Social History: Denies tobacco, etoh, illicit drug use.     REVIEW OF SYSTEMS:  Constitutional:     [x ] negative [ ] fevers [ ] chills [ ] weight loss [ ] weight gain  HEENT:                  [x ] negative [ ] dry eyes [ ] eye irritation [ ] postnasal drip [ ] nasal congestion  CV:                         [ x] negative  [ ] chest pain [ ] orthopnea [ ] palpitations [ ] murmur  Resp:                     [x ] negative [ ] cough [ ] shortness of breath [ ] dyspnea [ ] wheezing [ ] sputum [ ]hemoptysis  GI:                          [ x] negative [ ] nausea [ ] vomiting [ ] diarrhea [ ] constipation [ ] abd pain [ ] dysphagia   :                        [ x] negative [ ] dysuria [ ] nocturia [ ] hematuria [ ] increased urinary frequency  Musculoskeletal: [x ] negative [ ] back pain [ ] myalgias [ ] arthralgias [ ] fracture  Skin:                       [ x] negative [ ] rash [ ] itch  Neurological:        [ x] negative [ ] headache [ ] dizziness [ ] syncope [ ] weakness [ ] numbness  Psychiatric:           [ x] negative [ ] anxiety [ ] depression  Endocrine:            [ x] negative [ ] diabetes [ ] thyroid problem  Heme/Lymph:      [ x] negative [ ] anemia [ ] bleeding problem  Allergic/Immune: [ x] negative [ ] itchy eyes [ ] nasal discharge [ ] hives [ ] angioedema    [ x] All other systems negative  [ ] Unable to assess ROS due to      Physical Exam:  T(F): 98 (09-02), Max: 98 (09-02)  HR: 73 (09-02) (73 - 78)  BP: 150/108 (09-02) (150/108 - 177/92)  RR: 18 (09-02)  SpO2: 100% (09-02)  General: Alert, no acute distress, appears comfortable   HEENT: No scleral icterus, EOMI, no facial dysmorphia, no external ear lesions   Cardiac: Regular rate and rhythm, no murmurs, no rubs, no gallops   Pulmonary: Clear breath sounds throughout, no wheezing, no stridor, no crackles   Abdomen: Nondistended, nontender, appears soft   Skin: no obvious rash or lesions   Extremities: no LE edema  Neurological: Moving all 4 extremities, no overt focal deficits noted   Psych: normal mood and affect     Cardiovascular Diagnostic Testing:    ECG: Personally reviewed: SR. HOWELL in v6, I.     Imaging: CXR clear    CXR: Personally reviewed    Labs: Personally reviewed                        16.3   9.29  )-----------( 332      ( 02 Sep 2021 10:13 )             49.3     09-02    135  |  98  |  15  ----------------------------<  178<H>  5.5<H>   |  23  |  0.94    Ca    9.2      02 Sep 2021 10:13    TPro  8.3  /  Alb  4.1  /  TBili  1.3<H>  /  DBili  x   /  AST  49<H>  /  ALT  29  /  AlkPhos  68  09-02    PT/INR - ( 02 Sep 2021 11:14 )   PT: 11.8 sec;   INR: 1.03 ratio         PTT - ( 02 Sep 2021 11:14 )  PTT:31.1 sec  Serum Pro-Brain Natriuretic Peptide: 324 pg/mL (09-02 @ 10:13)

## 2021-09-03 NOTE — CONSULT NOTE ADULT - PROBLEM SELECTOR RECOMMENDATION 9
- HbA1c 9.2%  - start basal bolus: Lantus 15 units qhs and Admelog 5 units before meals  - low correction scale qac and qhs  - consistent carb diet  - check FS qac and qhs   - RD consult  - will follow  - for discharge: plan to dc on oral agents (Metformin +/- SGLT2 inhibitor). He requires glucometer teaching prior to dc.

## 2021-09-03 NOTE — DISCHARGE NOTE PROVIDER - PROVIDER TOKENS
PROVIDER:[TOKEN:[[id number]],FOLLOWUP:[1 week]] PROVIDER:[TOKEN:[[id number]],FOLLOWUP:[1 week]],FREE:[LAST:[Primary Care Physician],PHONE:[(   )    -],FAX:[(   )    -],ADDRESS:[Please follow up with your Primary Care Physician in 1 week for ongoing medical management.]]

## 2021-09-03 NOTE — PROVIDER CONTACT NOTE (CRITICAL VALUE NOTIFICATION) - ASSESSMENT
Patient reports no chest pain at this time. Patient resting comfortably in bed with vital signs stable. No respiratory distress noted.

## 2021-09-03 NOTE — PROGRESS NOTE ADULT - SUBJECTIVE AND OBJECTIVE BOX
Patient is a 71y old  Male who presents with a chief complaint of Chest pain (03 Sep 2021 14:30)    Landry Davison MD   [facility]   Pager [phone]    SUBJECTIVE / OVERNIGHT EVENTS:  Patient seen and examined today.   No chest pain, no SOB     MEDICATIONS  (STANDING):  aspirin enteric coated 81 milliGRAM(s) Oral daily  atorvastatin 80 milliGRAM(s) Oral at bedtime  carvedilol 3.125 milliGRAM(s) Oral every 12 hours  dextrose 40% Gel 15 Gram(s) Oral once  dextrose 5%. 1000 milliLiter(s) (50 mL/Hr) IV Continuous <Continuous>  dextrose 5%. 1000 milliLiter(s) (100 mL/Hr) IV Continuous <Continuous>  dextrose 50% Injectable 25 Gram(s) IV Push once  dextrose 50% Injectable 12.5 Gram(s) IV Push once  dextrose 50% Injectable 25 Gram(s) IV Push once  glucagon  Injectable 1 milliGRAM(s) IntraMuscular once  heparin  Infusion. 600 Unit(s)/Hr (6 mL/Hr) IV Continuous <Continuous>  insulin glargine Injectable (LANTUS) 15 Unit(s) SubCutaneous at bedtime  insulin lispro (ADMELOG) corrective regimen sliding scale   SubCutaneous three times a day before meals  insulin lispro (ADMELOG) corrective regimen sliding scale   SubCutaneous at bedtime  insulin lispro Injectable (ADMELOG) 5 Unit(s) SubCutaneous three times a day before meals    MEDICATIONS  (PRN):  heparin   Injectable 4000 Unit(s) IV Push every 6 hours PRN For aPTT less than 40      Vital Signs Last 24 Hrs  T(C): 36.3 (03 Sep 2021 11:29), Max: 37.1 (02 Sep 2021 18:22)  T(F): 97.4 (03 Sep 2021 11:29), Max: 98.8 (02 Sep 2021 18:22)  HR: 77 (03 Sep 2021 11:29) (70 - 78)  BP: 142/70 (03 Sep 2021 11:29) (118/84 - 146/90)  BP(mean): --  RR: 17 (03 Sep 2021 11:29) (17 - 18)  SpO2: 99% (03 Sep 2021 11:29) (97% - 99%)  CAPILLARY BLOOD GLUCOSE      POCT Blood Glucose.: 228 mg/dL (03 Sep 2021 11:40)  POCT Blood Glucose.: 160 mg/dL (03 Sep 2021 07:41)  POCT Blood Glucose.: 148 mg/dL (02 Sep 2021 21:59)  POCT Blood Glucose.: 175 mg/dL (02 Sep 2021 17:49)    I&O's Summary      Constitutional: middle aged man in bed in NAD, awake and alert  EYES: normal sclera  HEENT:  NCAT, moist oral mucosa   Respiratory: no respiratory distress, Breath sounds are clear bilaterally. No wheezing, rales or rhonchi  Cardiovascular: S1 and S2, regular rate and rhythm, no murmurs. peripheral pulses palpable x4  Gastrointestinal: Soft, nontender, nondistended. +BS  Extremities: No lower extremity edema, no calf tenderness, warm to touch  Skin: No rashes, No erythema   Psych: Alert and Oriented x3, normal mood, normal affect    LABS:                        15.0   9.24  )-----------( 342      ( 03 Sep 2021 07:38 )             45.3     09-    139  |  100  |  13  ----------------------------<  175<H>  4.4   |  22  |  0.84    Ca    9.0      03 Sep 2021 07:38    TPro  8.3  /  Alb  4.1  /  TBili  1.3<H>  /  DBili  x   /  AST  49<H>  /  ALT  29  /  AlkPhos  68  09-02    PT/INR - ( 02 Sep 2021 11:14 )   PT: 11.8 sec;   INR: 1.03 ratio         PTT - ( 03 Sep 2021 07:38 )  PTT:46.4 sec  CARDIAC MARKERS ( 03 Sep 2021 07:38 )  x     / x     / 142 U/L / x     / 15.2 ng/mL  CARDIAC MARKERS ( 02 Sep 2021 21:34 )  x     / x     / 185 U/L / x     / 19.9 ng/mL  CARDIAC MARKERS ( 02 Sep 2021 15:16 )  x     / x     / 197 U/L / x     / 22.5 ng/mL      Urinalysis Basic - ( 02 Sep 2021 15:16 )    Color: Light Yellow / Appearance: Clear / S.013 / pH: x  Gluc: x / Ketone: Negative  / Bili: Negative / Urobili: <2 mg/dL   Blood: x / Protein: 30 mg/dL / Nitrite: Negative   Leuk Esterase: Negative / RBC: 3 /HPF / WBC 0 /HPF   Sq Epi: x / Non Sq Epi: 0 /HPF / Bacteria: Negative        RADIOLOGY & ADDITIONAL TESTS:    Imaging Personally Reviewed:    Consultant(s) Notes Reviewed:      Care Discussed with Consultants/Other Providers:

## 2021-09-03 NOTE — DISCHARGE NOTE PROVIDER - HOSPITAL COURSE
72 y/o male w/ no reported pmhx presents w/ 2 episodes of unprovoked chest pain since this morning found to have elevated cardiac enzymes with abnormal EKG, admitted to [facility] for NSTEMI.    NSTEMI  - Monitored on telemetry   -  Cardiac enzymes  - Serial EKGs  - DASH 1800 ADA diet  - O2 Sat 100% RA  - Seen and evaluated by cardiology  - Heparin drip (monitor APTT,) Aspirin 81mg, Brilinta 90mg BID continued   - Started Coreg 3.125 mg PO q12h and atorvastatin 80mg qhs  - 9/3/2021, sp cardiac catheterization LAD 70, RCA 95, OM 90, RRA access  - CT surgery consult for CABG recommended, plan for pt to be transferred to [facility]     DM2 (diabetes mellitus, type 2).   - Daily glucose monitoring  - insulin sliding scale  - DASH 1800 ADA diet   - A1c 9.2   -diabetic education.     Prophylactic measure.    - Pt already on heparin drip for ACS.  - Educated pt on importance of going to PCP regularly.    On_________, discussed with __________, patient is medically cleared and optimized for transfer to [facility] today. All medications were reviewed with attending, and sent to mutually agreed upon pharmacy.   70 y/o male w/ no reported pmhx presents w/ 2 episodes of unprovoked chest pain since this morning found to have elevated cardiac enzymes with abnormal EKG, admitted to [facility] for NSTEMI.    NSTEMI  - Monitored on telemetry   -  Cardiac enzymes  - Serial EKGs  - DASH 1800 ADA diet  - O2 Sat 100% RA  - Seen and evaluated by cardiology  - Heparin drip (monitor APTT,) Aspirin 81mg, Brilinta 90mg BID continued   - Started Coreg 3.125 mg PO q12h and atorvastatin 80mg qhs  - 9/3/2021, sp cardiac catheterization LAD 70, RCA 95, OM 90, RRA access  - CT surgery consult for CABG recommended, plan for pt to be transferred to [facility]     DM2 (diabetes mellitus, type 2).   - Daily glucose monitoring  - insulin sliding scale  - DASH 1800 ADA diet   - A1c 9.2   -diabetic education.     Prophylactic measure.    - Pt already on heparin drip for ACS.  - Educated pt on importance of going to PCP regularly.    Patient seen and evaluated. S/p C with CAD, triple vessel disease. Patient case reviewed and discussed with Attending Physician Dr Davison who agrees patient is stable for transfer to [facility] to be evaluated by CT Surgery for possible surgical management, with accepting physician Dr Ovalles. Patient and family aware, with consent agreed upon and obtained from the patient. All questions and concerns addressed to patient's satisfaction. Patient understands and agrees with plan of care.   72 y/o male w/ no reported pmhx presents w/ 2 episodes of unprovoked chest pain since this morning found to have elevated cardiac enzymes with abnormal EKG, admitted to [facility] for NSTEMI. Had cardiac cath that showed 3v disease and is now planned for CABG eval at [facility].     NSTEMI  - Monitored on telemetry   -  Cardiac enzymes  - Serial EKGs  - DASH 1800 ADA diet  - O2 Sat 100% RA  - Seen and evaluated by cardiology  - Heparin drip (monitor APTT,) Aspirin 81mg, Brilinta 90mg BID continued   - Started Coreg 3.125 mg PO q12h and atorvastatin 80mg qhs  - 9/3/2021, sp cardiac catheterization LAD 70, RCA 95, OM 90, RRA access  - CT surgery consult for CABG recommended, plan for pt to be transferred to [facility]     DM2 (diabetes mellitus, type 2).   - A1c 9.2   - endo consulted  - on lantus 15 unit and premeal 5 u. will likely be discharged on oral regimen   ** will need diabetic education, supplies/glucometer, prior to discharge.    Patient seen and evaluated. S/p C with CAD, triple vessel disease. Patient case reviewed and discussed with Attending Physician Dr Davison who agrees patient is stable for transfer to [facility] to be evaluated by CT Surgery for possible surgical management, with accepting physician Dr Ovalles. Patient and family aware, with consent agreed upon and obtained from the patient. All questions and concerns addressed to patient's satisfaction. Patient understands and agrees with plan of care.

## 2021-09-03 NOTE — CONSULT NOTE ADULT - PROBLEM SELECTOR RECOMMENDATION 3
-   - c/w Lipitor 80 mg qhs    Virginia Pope MD   Pager # [phone]  On evenings and weekends, please call the office at [phone] or page endocrine fellow on call. Please note that this patient may be followed by different provider tomorrow. If no answer, contact the office.

## 2021-09-03 NOTE — PROGRESS NOTE ADULT - ASSESSMENT
72yo M with no known PMH who presents with chest pain, found to have troponin of 329, admitted for NSTEMI. Cath showed severe multivessel CAD, plan for transfer to [facility] for CTS evaluation.     #Coronary artery disease - 3v disease on cath   #NSTEMI - VALORIE 3, ABILIO 101   #Hypertension - SBP 170s, repeat 150s    Plan:  -Continue ASA, hold P2Y12 for likely CABG  -Continue Atorvastatin, Coreg   -Continue heparin gtt  -Maintain on telemetry   -Pending transfer to [facility] for CTS ling Ng MD  Cardiology Fellow - PGY 4  For all New Consults and Questions:  [email or web address]   Login: [email or web address]

## 2021-09-03 NOTE — PROGRESS NOTE ADULT - SUBJECTIVE AND OBJECTIVE BOX
Patient seen and examined at bedside.    Overnight Events: No CP, SOB. Had cath this AM, showed severe multivessel CAD. Plan for transfer to [facility] for cath.     Review Of Systems: No chest pain, shortness of breath, or palpitations            Current Meds:  aspirin enteric coated 81 milliGRAM(s) Oral daily  atorvastatin 80 milliGRAM(s) Oral at bedtime  carvedilol 3.125 milliGRAM(s) Oral every 12 hours  dextrose 40% Gel 15 Gram(s) Oral once  dextrose 5%. 1000 milliLiter(s) IV Continuous <Continuous>  dextrose 5%. 1000 milliLiter(s) IV Continuous <Continuous>  dextrose 50% Injectable 25 Gram(s) IV Push once  dextrose 50% Injectable 12.5 Gram(s) IV Push once  dextrose 50% Injectable 25 Gram(s) IV Push once  glucagon  Injectable 1 milliGRAM(s) IntraMuscular once  insulin lispro (ADMELOG) corrective regimen sliding scale   SubCutaneous three times a day before meals  insulin lispro (ADMELOG) corrective regimen sliding scale   SubCutaneous at bedtime      Vitals:  T(F): 97.2 (), Max: 98.8 ()  HR: 70 () (70 - 78)  BP: 129/87 () (118/84 - 150/108)  RR: 18 ()  SpO2: 99% ()  I&O's Summary      Physical Exam:  Appearance: No acute distress; well appearing  Eyes: EOMI, no scleral icterus   HEENT: Normal oral mucosa  Cardiovascular: RRR, S1, S2, no murmurs, rubs, or gallops; no edema; no JVD  Respiratory: Clear to auscultation bilaterally, no auditory stridor   Gastrointestinal: soft, non-tender, non-distended with normal bowel sounds  Musculoskeletal: No clubbing; no joint deformity   Neurologic: Non-focal  Lymphatic: No lymphadenopathy  Psychiatry: AAOx3, mood & affect appropriate  Skin: No rashes, ecchymoses, or cyanosis                          15.0   9.24  )-----------( 342      ( 03 Sep 2021 07:38 )             45.3         139  |  100  |  13  ----------------------------<  175<H>  4.4   |  22  |  0.84    Ca    9.0      03 Sep 2021 07:38    TPro  8.3  /  Alb  4.1  /  TBili  1.3<H>  /  DBili  x   /  AST  49<H>  /  ALT  29  /  AlkPhos  68  09-02    PT/INR - ( 02 Sep 2021 11:14 )   PT: 11.8 sec;   INR: 1.03 ratio         PTT - ( 03 Sep 2021 07:38 )  PTT:46.4 sec  CARDIAC MARKERS ( 03 Sep 2021 07:38 )  740 ng/L / x     / x     / 142 U/L / x     / 15.2 ng/mL  CARDIAC MARKERS ( 02 Sep 2021 21:34 )  962 ng/L / x     / x     / 185 U/L / x     / 19.9 ng/mL  CARDIAC MARKERS ( 02 Sep 2021 15:16 )  545 ng/L / x     / x     / 197 U/L / x     / 22.5 ng/mL  CARDIAC MARKERS ( 02 Sep 2021 10:13 )  329 ng/L / x     / x     / x     / x     / x          Serum Pro-Brain Natriuretic Peptide: 324 pg/mL ( @ 10:13)    Total Cholesterol: 165  LDL: --  HDL: 42  T        New ECG(s): Personally reviewed    Cath:  CORONARY VESSELS: The coronary circulation is right dominant.  LM:   --  Mid left main: There was a discrete 40 % stenosis at a site with  no prior intervention. There was VALORIE grade 3 flow through the vessel  (brisk flow).  LAD:   --  Proximal LAD: There was a tubular 80 % stenosis at a site with  no prior intervention. There was VALORIE grade 3 flow through the vessel  (brisk flow).  CX:   --  OM1: There was a diffuse 95 % stenosis at a site withno prior  intervention. There was VALORIE grade 3 flow through the vessel (brisk flow).  RCA:   --  Mid RCA: There was a discrete 95 % stenosis at a site with no  prior intervention. There was VALORIE grade 3 flow through the vessel (brisk  flow).  COMPLICATIONS: There were no complications.  DIAGNOSTIC IMPRESSIONS: Severe Multivessel CAD  Recommend CTS for CABG

## 2021-09-03 NOTE — DISCHARGE NOTE PROVIDER - CARE PROVIDER_API CALL
Wayne Ovalles)  Thoracic and Cardiac Surgery  [address] [place] [address]  Phone: [phone]  Fax: [phone]  Follow Up Time: 1 week   Wayne Ovalles)  Thoracic and Cardiac Surgery  [address] [place] [address]  Phone: [phone]  Fax: [phone]  Follow Up Time: 1 week    Primary Care Physician,   Please follow up with your Primary Care Physician in 1 week for ongoing medical management.  Phone: (   )    -  Fax: (   )    -  Follow Up Time:

## 2021-09-03 NOTE — PROGRESS NOTE ADULT - PROBLEM SELECTOR PLAN 2
new diagnosis   A1c 9.1  start on lantus 15 unit and premeal 5 u   will need diabetic education, supplies, prior to discharge

## 2021-09-04 ENCOUNTER — TRANSCRIPTION ENCOUNTER (OUTPATIENT)
Age: 71
End: 2021-09-04

## 2021-09-04 ENCOUNTER — INPATIENT (INPATIENT)
Facility: HOSPITAL | Age: 71
LOS: 9 days | Discharge: HOME HEALTH | DRG: 235 | End: 2021-09-14
Attending: THORACIC SURGERY (CARDIOTHORACIC VASCULAR SURGERY) | Admitting: THORACIC SURGERY (CARDIOTHORACIC VASCULAR SURGERY)
Payer: COMMERCIAL

## 2021-09-04 VITALS
DIASTOLIC BLOOD PRESSURE: 92 MMHG | RESPIRATION RATE: 17 BRPM | TEMPERATURE: 97 F | SYSTOLIC BLOOD PRESSURE: 160 MMHG | HEART RATE: 72 BPM | OXYGEN SATURATION: 100 %

## 2021-09-04 VITALS — HEIGHT: 66 IN | WEIGHT: 150.58 LBS

## 2021-09-04 DIAGNOSIS — I25.10 ATHEROSCLEROTIC HEART DISEASE OF NATIVE CORONARY ARTERY WITHOUT ANGINA PECTORIS: ICD-10-CM

## 2021-09-04 DIAGNOSIS — Z98.49 CATARACT EXTRACTION STATUS, UNSPECIFIED EYE: Chronic | ICD-10-CM

## 2021-09-04 PROBLEM — Z78.9 OTHER SPECIFIED HEALTH STATUS: Chronic | Status: ACTIVE | Noted: 2021-09-02

## 2021-09-04 LAB
ALBUMIN SERPL ELPH-MCNC: 3.6 G/DL — SIGNIFICANT CHANGE UP (ref 3.3–5)
ALP SERPL-CCNC: 76 U/L — SIGNIFICANT CHANGE UP (ref 40–120)
ALT FLD-CCNC: 21 U/L — SIGNIFICANT CHANGE UP (ref 10–45)
ANION GAP SERPL CALC-SCNC: 12 MMOL/L — SIGNIFICANT CHANGE UP (ref 7–14)
ANION GAP SERPL CALC-SCNC: 14 MMOL/L — SIGNIFICANT CHANGE UP (ref 5–17)
APTT BLD: 62.9 SEC — HIGH (ref 27–36.3)
APTT BLD: 65.5 SEC — HIGH (ref 27–36.3)
APTT BLD: 66.3 SEC — HIGH (ref 27.5–35.5)
AST SERPL-CCNC: 21 U/L — SIGNIFICANT CHANGE UP (ref 10–40)
BASOPHILS # BLD AUTO: 0.04 K/UL — SIGNIFICANT CHANGE UP (ref 0–0.2)
BASOPHILS NFR BLD AUTO: 0.4 % — SIGNIFICANT CHANGE UP (ref 0–2)
BILIRUB SERPL-MCNC: 1.4 MG/DL — HIGH (ref 0.2–1.2)
BLD GP AB SCN SERPL QL: NEGATIVE — SIGNIFICANT CHANGE UP
BUN SERPL-MCNC: 14 MG/DL — SIGNIFICANT CHANGE UP (ref 7–23)
BUN SERPL-MCNC: 16 MG/DL — SIGNIFICANT CHANGE UP (ref 7–23)
CALCIUM SERPL-MCNC: 9.1 MG/DL — SIGNIFICANT CHANGE UP (ref 8.4–10.5)
CALCIUM SERPL-MCNC: 9.2 MG/DL — SIGNIFICANT CHANGE UP (ref 8.4–10.5)
CHLORIDE SERPL-SCNC: 100 MMOL/L — SIGNIFICANT CHANGE UP (ref 98–107)
CHLORIDE SERPL-SCNC: 102 MMOL/L — SIGNIFICANT CHANGE UP (ref 96–108)
CO2 SERPL-SCNC: 19 MMOL/L — LOW (ref 22–31)
CO2 SERPL-SCNC: 22 MMOL/L — SIGNIFICANT CHANGE UP (ref 22–31)
CREAT SERPL-MCNC: 1.03 MG/DL — SIGNIFICANT CHANGE UP (ref 0.5–1.3)
CREAT SERPL-MCNC: 1.15 MG/DL — SIGNIFICANT CHANGE UP (ref 0.5–1.3)
EOSINOPHIL # BLD AUTO: 0.05 K/UL — SIGNIFICANT CHANGE UP (ref 0–0.5)
EOSINOPHIL NFR BLD AUTO: 0.5 % — SIGNIFICANT CHANGE UP (ref 0–6)
GLUCOSE BLDC GLUCOMTR-MCNC: 105 MG/DL — HIGH (ref 70–99)
GLUCOSE BLDC GLUCOMTR-MCNC: 110 MG/DL — HIGH (ref 70–99)
GLUCOSE BLDC GLUCOMTR-MCNC: 111 MG/DL — HIGH (ref 70–99)
GLUCOSE BLDC GLUCOMTR-MCNC: 161 MG/DL — HIGH (ref 70–99)
GLUCOSE BLDC GLUCOMTR-MCNC: 192 MG/DL — HIGH (ref 70–99)
GLUCOSE SERPL-MCNC: 162 MG/DL — HIGH (ref 70–99)
GLUCOSE SERPL-MCNC: 298 MG/DL — HIGH (ref 70–99)
HCT VFR BLD CALC: 45.4 % — SIGNIFICANT CHANGE UP (ref 39–50)
HCT VFR BLD CALC: 47.3 % — SIGNIFICANT CHANGE UP (ref 39–50)
HGB BLD-MCNC: 15 G/DL — SIGNIFICANT CHANGE UP (ref 13–17)
HGB BLD-MCNC: 15.3 G/DL — SIGNIFICANT CHANGE UP (ref 13–17)
IMM GRANULOCYTES NFR BLD AUTO: 0.5 % — SIGNIFICANT CHANGE UP (ref 0–1.5)
INR BLD: 1.1 RATIO — SIGNIFICANT CHANGE UP (ref 0.88–1.16)
LYMPHOCYTES # BLD AUTO: 27.8 % — SIGNIFICANT CHANGE UP (ref 13–44)
LYMPHOCYTES # BLD AUTO: 3.08 K/UL — SIGNIFICANT CHANGE UP (ref 1–3.3)
MAGNESIUM SERPL-MCNC: 2.1 MG/DL — SIGNIFICANT CHANGE UP (ref 1.6–2.6)
MCHC RBC-ENTMCNC: 25 PG — LOW (ref 27–34)
MCHC RBC-ENTMCNC: 25.5 PG — LOW (ref 27–34)
MCHC RBC-ENTMCNC: 31.7 GM/DL — LOW (ref 32–36)
MCHC RBC-ENTMCNC: 33.7 GM/DL — SIGNIFICANT CHANGE UP (ref 32–36)
MCV RBC AUTO: 75.7 FL — LOW (ref 80–100)
MCV RBC AUTO: 78.7 FL — LOW (ref 80–100)
MONOCYTES # BLD AUTO: 0.9 K/UL — SIGNIFICANT CHANGE UP (ref 0–0.9)
MONOCYTES NFR BLD AUTO: 8.1 % — SIGNIFICANT CHANGE UP (ref 2–14)
NEUTROPHILS # BLD AUTO: 6.97 K/UL — SIGNIFICANT CHANGE UP (ref 1.8–7.4)
NEUTROPHILS NFR BLD AUTO: 62.7 % — SIGNIFICANT CHANGE UP (ref 43–77)
NRBC # BLD: 0 /100 WBCS — SIGNIFICANT CHANGE UP
NRBC # BLD: 0 /100 WBCS — SIGNIFICANT CHANGE UP (ref 0–0)
NRBC # FLD: 0 K/UL — SIGNIFICANT CHANGE UP
PHOSPHATE SERPL-MCNC: 3.2 MG/DL — SIGNIFICANT CHANGE UP (ref 2.5–4.5)
PLATELET # BLD AUTO: 285 K/UL — SIGNIFICANT CHANGE UP (ref 150–400)
PLATELET # BLD AUTO: 309 K/UL — SIGNIFICANT CHANGE UP (ref 150–400)
POTASSIUM SERPL-MCNC: 4.2 MMOL/L — SIGNIFICANT CHANGE UP (ref 3.5–5.3)
POTASSIUM SERPL-MCNC: 4.2 MMOL/L — SIGNIFICANT CHANGE UP (ref 3.5–5.3)
POTASSIUM SERPL-SCNC: 4.2 MMOL/L — SIGNIFICANT CHANGE UP (ref 3.5–5.3)
POTASSIUM SERPL-SCNC: 4.2 MMOL/L — SIGNIFICANT CHANGE UP (ref 3.5–5.3)
PROT SERPL-MCNC: 7.2 G/DL — SIGNIFICANT CHANGE UP (ref 6–8.3)
PROTHROM AB SERPL-ACNC: 13.1 SEC — SIGNIFICANT CHANGE UP (ref 10.6–13.6)
RBC # BLD: 6 M/UL — HIGH (ref 4.2–5.8)
RBC # BLD: 6.01 M/UL — HIGH (ref 4.2–5.8)
RBC # FLD: 14.5 % — SIGNIFICANT CHANGE UP (ref 10.3–14.5)
RBC # FLD: 14.9 % — HIGH (ref 10.3–14.5)
RH IG SCN BLD-IMP: POSITIVE — SIGNIFICANT CHANGE UP
SODIUM SERPL-SCNC: 134 MMOL/L — LOW (ref 135–145)
SODIUM SERPL-SCNC: 135 MMOL/L — SIGNIFICANT CHANGE UP (ref 135–145)
WBC # BLD: 10.24 K/UL — SIGNIFICANT CHANGE UP (ref 3.8–10.5)
WBC # BLD: 11.09 K/UL — HIGH (ref 3.8–10.5)
WBC # FLD AUTO: 10.24 K/UL — SIGNIFICANT CHANGE UP (ref 3.8–10.5)
WBC # FLD AUTO: 11.09 K/UL — HIGH (ref 3.8–10.5)

## 2021-09-04 PROCEDURE — 99232 SBSQ HOSP IP/OBS MODERATE 35: CPT

## 2021-09-04 PROCEDURE — 93010 ELECTROCARDIOGRAM REPORT: CPT

## 2021-09-04 PROCEDURE — 99239 HOSP IP/OBS DSCHRG MGMT >30: CPT

## 2021-09-04 PROCEDURE — 99223 1ST HOSP IP/OBS HIGH 75: CPT

## 2021-09-04 PROCEDURE — 93306 TTE W/DOPPLER COMPLETE: CPT | Mod: 26

## 2021-09-04 RX ORDER — INSULIN LISPRO 100/ML
0 VIAL (ML) SUBCUTANEOUS
Qty: 0 | Refills: 0 | DISCHARGE
Start: 2021-09-04

## 2021-09-04 RX ORDER — INSULIN GLARGINE 100 [IU]/ML
15 INJECTION, SOLUTION SUBCUTANEOUS
Qty: 0 | Refills: 0 | DISCHARGE
Start: 2021-09-04

## 2021-09-04 RX ORDER — SODIUM CHLORIDE 9 MG/ML
1000 INJECTION, SOLUTION INTRAVENOUS
Refills: 0 | Status: DISCONTINUED | OUTPATIENT
Start: 2021-09-04 | End: 2021-09-04

## 2021-09-04 RX ORDER — METOPROLOL TARTRATE 50 MG
25 TABLET ORAL EVERY 12 HOURS
Refills: 0 | Status: DISCONTINUED | OUTPATIENT
Start: 2021-09-04 | End: 2021-09-09

## 2021-09-04 RX ORDER — HEPARIN SODIUM 5000 [USP'U]/ML
750 INJECTION INTRAVENOUS; SUBCUTANEOUS
Qty: 25000 | Refills: 0 | Status: DISCONTINUED | OUTPATIENT
Start: 2021-09-04 | End: 2021-09-07

## 2021-09-04 RX ORDER — DEXTROSE 50 % IN WATER 50 %
25 SYRINGE (ML) INTRAVENOUS ONCE
Refills: 0 | Status: DISCONTINUED | OUTPATIENT
Start: 2021-09-04 | End: 2021-09-09

## 2021-09-04 RX ORDER — ATORVASTATIN CALCIUM 80 MG/1
80 TABLET, FILM COATED ORAL AT BEDTIME
Refills: 0 | Status: DISCONTINUED | OUTPATIENT
Start: 2021-09-04 | End: 2021-09-09

## 2021-09-04 RX ORDER — HEPARIN SODIUM 5000 [USP'U]/ML
6 INJECTION INTRAVENOUS; SUBCUTANEOUS
Qty: 0 | Refills: 0 | DISCHARGE
Start: 2021-09-04

## 2021-09-04 RX ORDER — ASPIRIN/CALCIUM CARB/MAGNESIUM 324 MG
1 TABLET ORAL
Qty: 0 | Refills: 0 | DISCHARGE
Start: 2021-09-04

## 2021-09-04 RX ORDER — INSULIN LISPRO 100/ML
VIAL (ML) SUBCUTANEOUS
Refills: 0 | Status: DISCONTINUED | OUTPATIENT
Start: 2021-09-04 | End: 2021-09-09

## 2021-09-04 RX ORDER — INSULIN LISPRO 100/ML
5 VIAL (ML) SUBCUTANEOUS
Qty: 0 | Refills: 0 | DISCHARGE
Start: 2021-09-04

## 2021-09-04 RX ORDER — INSULIN LISPRO 100/ML
5 VIAL (ML) SUBCUTANEOUS
Refills: 0 | Status: DISCONTINUED | OUTPATIENT
Start: 2021-09-04 | End: 2021-09-09

## 2021-09-04 RX ORDER — PANTOPRAZOLE SODIUM 20 MG/1
40 TABLET, DELAYED RELEASE ORAL
Refills: 0 | Status: DISCONTINUED | OUTPATIENT
Start: 2021-09-04 | End: 2021-09-09

## 2021-09-04 RX ORDER — ASPIRIN/CALCIUM CARB/MAGNESIUM 324 MG
81 TABLET ORAL DAILY
Refills: 0 | Status: DISCONTINUED | OUTPATIENT
Start: 2021-09-04 | End: 2021-09-09

## 2021-09-04 RX ORDER — DEXTROSE 50 % IN WATER 50 %
15 SYRINGE (ML) INTRAVENOUS ONCE
Refills: 0 | Status: DISCONTINUED | OUTPATIENT
Start: 2021-09-04 | End: 2021-09-09

## 2021-09-04 RX ORDER — INSULIN GLARGINE 100 [IU]/ML
15 INJECTION, SOLUTION SUBCUTANEOUS AT BEDTIME
Refills: 0 | Status: DISCONTINUED | OUTPATIENT
Start: 2021-09-04 | End: 2021-09-09

## 2021-09-04 RX ORDER — ATORVASTATIN CALCIUM 80 MG/1
1 TABLET, FILM COATED ORAL
Qty: 0 | Refills: 0 | DISCHARGE
Start: 2021-09-04

## 2021-09-04 RX ORDER — HEPARIN SODIUM 5000 [USP'U]/ML
4000 INJECTION INTRAVENOUS; SUBCUTANEOUS
Qty: 0 | Refills: 0 | DISCHARGE
Start: 2021-09-04

## 2021-09-04 RX ORDER — INFLUENZA VIRUS VACCINE 15; 15; 15; 15 UG/.5ML; UG/.5ML; UG/.5ML; UG/.5ML
0.5 SUSPENSION INTRAMUSCULAR ONCE
Refills: 0 | Status: DISCONTINUED | OUTPATIENT
Start: 2021-09-04 | End: 2021-09-09

## 2021-09-04 RX ORDER — GLUCAGON INJECTION, SOLUTION 0.5 MG/.1ML
1 INJECTION, SOLUTION SUBCUTANEOUS ONCE
Refills: 0 | Status: DISCONTINUED | OUTPATIENT
Start: 2021-09-04 | End: 2021-09-04

## 2021-09-04 RX ORDER — INSULIN LISPRO 100/ML
VIAL (ML) SUBCUTANEOUS AT BEDTIME
Refills: 0 | Status: DISCONTINUED | OUTPATIENT
Start: 2021-09-04 | End: 2021-09-09

## 2021-09-04 RX ORDER — CARVEDILOL PHOSPHATE 80 MG/1
1 CAPSULE, EXTENDED RELEASE ORAL
Qty: 0 | Refills: 0 | DISCHARGE
Start: 2021-09-04

## 2021-09-04 RX ORDER — SODIUM CHLORIDE 9 MG/ML
3 INJECTION INTRAMUSCULAR; INTRAVENOUS; SUBCUTANEOUS EVERY 8 HOURS
Refills: 0 | Status: DISCONTINUED | OUTPATIENT
Start: 2021-09-04 | End: 2021-09-09

## 2021-09-04 RX ADMIN — Medication 1: at 07:58

## 2021-09-04 RX ADMIN — CARVEDILOL PHOSPHATE 3.12 MILLIGRAM(S): 80 CAPSULE, EXTENDED RELEASE ORAL at 05:51

## 2021-09-04 RX ADMIN — Medication 25 MILLIGRAM(S): at 23:41

## 2021-09-04 RX ADMIN — HEPARIN SODIUM 750 UNIT(S)/HR: 5000 INJECTION INTRAVENOUS; SUBCUTANEOUS at 16:00

## 2021-09-04 RX ADMIN — HEPARIN SODIUM 750 UNIT(S)/HR: 5000 INJECTION INTRAVENOUS; SUBCUTANEOUS at 00:01

## 2021-09-04 RX ADMIN — HEPARIN SODIUM 750 UNIT(S)/HR: 5000 INJECTION INTRAVENOUS; SUBCUTANEOUS at 09:44

## 2021-09-04 RX ADMIN — Medication 5 UNIT(S): at 12:11

## 2021-09-04 RX ADMIN — Medication 5 UNIT(S): at 07:58

## 2021-09-04 RX ADMIN — INSULIN GLARGINE 15 UNIT(S): 100 INJECTION, SOLUTION SUBCUTANEOUS at 23:38

## 2021-09-04 RX ADMIN — ATORVASTATIN CALCIUM 80 MILLIGRAM(S): 80 TABLET, FILM COATED ORAL at 23:38

## 2021-09-04 RX ADMIN — SODIUM CHLORIDE 3 MILLILITER(S): 9 INJECTION INTRAMUSCULAR; INTRAVENOUS; SUBCUTANEOUS at 23:40

## 2021-09-04 NOTE — PROGRESS NOTE ADULT - SUBJECTIVE AND OBJECTIVE BOX
For all Cardiology service contact information, go to [email or web address] and use "[email or web address]" to login.    SUBJECTIVE:   No events overnight. Denies CP, SOB or Dyspnea.   -------------------------------------------------------------------------------------------  ROS:  CV: chest pain (-), palpitation (-), orthopnea (-), PND (-), edema (-)  PULM: SOB (-), cough (-), wheezing (-), hemoptysis (-).   CONST: fever (-), chills (-) or fatigue (-)  GI: abdominal distension (-), abdominal pain (-) , nausea/vomiting (-), hematemesis, (-), melena (-), hematochezia (-)  : dysuria (-), frequency (-), hematuria (-).   NEURO: numbness (-), weakness (-), dizziness (-)  SKIN: itching (-), rash (-)  HEENT:  visual changes (-); vertigo or throat pain (-);  neck stiffness (-)     All other review of systems is negative unless indicated above.   -------------------------------------------------------------------------------------------  VS:  T(F): 97.7 (), Max: 98.6 ()  HR: 74 () (69 - 77)  BP: 129/86 () (126/75 - 142/70)  RR: 17 ()  SpO2: 99% ()  I&O's Summary    ------------------------------------------------------------------------------------------  PHYSICAL EXAM:  Appearance: No acute distress; well appearing  Eyes: EOMI, no scleral icterus   HEENT: Normal oral mucosa  Cardiovascular: RRR, S1, S2, no murmurs, rubs, or gallops; no edema; no JVD  Respiratory: Clear to auscultation bilaterally, no auditory stridor   Gastrointestinal: soft, non-tender, non-distended with normal bowel sounds  Musculoskeletal: No clubbing; no joint deformity   Neurologic: Non-focal  Lymphatic: No lymphadenopathy  Psychiatry: AAOx3, mood & affect appropriate  Skin: No rashes, ecchymoses, or cyanosis    -------------------------------------------------------------------------------------------  LABS:                          15.2   9.33  )-----------( 314      ( 03 Sep 2021 23:16 )             45.3         139  |  100  |  13  ----------------------------<  175<H>  4.4   |  22  |  0.84    Ca    9.0      03 Sep 2021 07:38    TPro  8.3  /  Alb  4.1  /  TBili  1.3<H>  /  DBili  x   /  AST  49<H>  /  ALT  29  /  AlkPhos  68  09-02    PT/INR - ( 02 Sep 2021 11:14 )   PT: 11.8 sec;   INR: 1.03 ratio         PTT - ( 03 Sep 2021 23:16 )  PTT:48.1 sec  CARDIAC MARKERS ( 03 Sep 2021 07:38 )  740 ng/L / x     / x     / 142 U/L / x     / 15.2 ng/mL  CARDIAC MARKERS ( 02 Sep 2021 21:34 )  962 ng/L / x     / x     / 185 U/L / x     / 19.9 ng/mL  CARDIAC MARKERS ( 02 Sep 2021 15:16 )  545 ng/L / x     / x     / 197 U/L / x     / 22.5 ng/mL  CARDIAC MARKERS ( 02 Sep 2021 10:13 )  329 ng/L / x     / x     / x     / x     / x          Total Cholesterol: 165  LDL: --  HDL: 42  T        -------------------------------------------------------------------------------------------  Meds:  aspirin enteric coated 81 milliGRAM(s) Oral daily  atorvastatin 80 milliGRAM(s) Oral at bedtime  carvedilol 3.125 milliGRAM(s) Oral every 12 hours  dextrose 40% Gel 15 Gram(s) Oral once  dextrose 5%. 1000 milliLiter(s) IV Continuous <Continuous>  dextrose 5%. 1000 milliLiter(s) IV Continuous <Continuous>  dextrose 50% Injectable 25 Gram(s) IV Push once  dextrose 50% Injectable 12.5 Gram(s) IV Push once  dextrose 50% Injectable 25 Gram(s) IV Push once  glucagon  Injectable 1 milliGRAM(s) IntraMuscular once  heparin   Injectable 4000 Unit(s) IV Push every 6 hours PRN  heparin  Infusion. 600 Unit(s)/Hr IV Continuous <Continuous>  insulin glargine Injectable (LANTUS) 15 Unit(s) SubCutaneous at bedtime  insulin lispro (ADMELOG) corrective regimen sliding scale   SubCutaneous three times a day before meals  insulin lispro (ADMELOG) corrective regimen sliding scale   SubCutaneous at bedtime  insulin lispro Injectable (ADMELOG) 5 Unit(s) SubCutaneous three times a day before meals    -------------------------------------------------------------------------------------------     For all Cardiology service contact information, go to [email or web address] and use "[email or web address] to login.    SUBJECTIVE:   No events overnight. Denies CP, SOB or Dyspnea.   Tele: NSR 90s. No events noted.   -------------------------------------------------------------------------------------------  ROS:  CV: chest pain (-), palpitation (-), orthopnea (-), PND (-), edema (-)  PULM: SOB (-), cough (-), wheezing (-), hemoptysis (-).   CONST: fever (-), chills (-) or fatigue (-)  GI: abdominal distension (-), abdominal pain (-) , nausea/vomiting (-), hematemesis, (-), melena (-), hematochezia (-)  : dysuria (-), frequency (-), hematuria (-).   NEURO: numbness (-), weakness (-), dizziness (-)  SKIN: itching (-), rash (-)  HEENT:  visual changes (-); vertigo or throat pain (-);  neck stiffness (-)     All other review of systems is negative unless indicated above.   -------------------------------------------------------------------------------------------  VS:  T(F): 97.7 (), Max: 98.6 ()  HR: 74 () (69 - 77)  BP: 129/86 () (126/75 - 142/70)  RR: 17 ()  SpO2: 99% ()  I&O's Summary    ------------------------------------------------------------------------------------------  PHYSICAL EXAM:  Appearance: No acute distress; well appearing  Eyes: EOMI, no scleral icterus   HEENT: Normal oral mucosa  Cardiovascular: RRR, S1, S2, no murmurs, rubs, or gallops; no edema; no JVD  Respiratory: Clear to auscultation bilaterally, no auditory stridor   Gastrointestinal: soft, non-tender, non-distended with normal bowel sounds  Musculoskeletal: No clubbing; no joint deformity   Neurologic: Non-focal  Lymphatic: No lymphadenopathy  Psychiatry: AAOx3, mood & affect appropriate  Skin: No rashes, ecchymoses, or cyanosis    -------------------------------------------------------------------------------------------  LABS:                          15.2   9.33  )-----------( 314      ( 03 Sep 2021 23:16 )             45.3         139  |  100  |  13  ----------------------------<  175<H>  4.4   |  22  |  0.84    Ca    9.0      03 Sep 2021 07:38    TPro  8.3  /  Alb  4.1  /  TBili  1.3<H>  /  DBili  x   /  AST  49<H>  /  ALT  29  /  AlkPhos  68      PT/INR - ( 02 Sep 2021 11:14 )   PT: 11.8 sec;   INR: 1.03 ratio         PTT - ( 03 Sep 2021 23:16 )  PTT:48.1 sec  CARDIAC MARKERS ( 03 Sep 2021 07:38 )  740 ng/L / x     / x     / 142 U/L / x     / 15.2 ng/mL  CARDIAC MARKERS ( 02 Sep 2021 21:34 )  962 ng/L / x     / x     / 185 U/L / x     / 19.9 ng/mL  CARDIAC MARKERS ( 02 Sep 2021 15:16 )  545 ng/L / x     / x     / 197 U/L / x     / 22.5 ng/mL  CARDIAC MARKERS ( 02 Sep 2021 10:13 )  329 ng/L / x     / x     / x     / x     / x          Total Cholesterol: 165  LDL: --  HDL: 42  T        -------------------------------------------------------------------------------------------  Meds:  aspirin enteric coated 81 milliGRAM(s) Oral daily  atorvastatin 80 milliGRAM(s) Oral at bedtime  carvedilol 3.125 milliGRAM(s) Oral every 12 hours  dextrose 40% Gel 15 Gram(s) Oral once  dextrose 5%. 1000 milliLiter(s) IV Continuous <Continuous>  dextrose 5%. 1000 milliLiter(s) IV Continuous <Continuous>  dextrose 50% Injectable 25 Gram(s) IV Push once  dextrose 50% Injectable 12.5 Gram(s) IV Push once  dextrose 50% Injectable 25 Gram(s) IV Push once  glucagon  Injectable 1 milliGRAM(s) IntraMuscular once  heparin   Injectable 4000 Unit(s) IV Push every 6 hours PRN  heparin  Infusion. 600 Unit(s)/Hr IV Continuous <Continuous>  insulin glargine Injectable (LANTUS) 15 Unit(s) SubCutaneous at bedtime  insulin lispro (ADMELOG) corrective regimen sliding scale   SubCutaneous three times a day before meals  insulin lispro (ADMELOG) corrective regimen sliding scale   SubCutaneous at bedtime  insulin lispro Injectable (ADMELOG) 5 Unit(s) SubCutaneous three times a day before meals    -------------------------------------------------------------------------------------------     For all Cardiology service contact information, go to [email or web address] and use "[email or web address]" to login.    SUBJECTIVE:   No events overnight. Denies CP, SOB or Dyspnea.   Tele: NSR 90s. No events noted.   -------------------------------------------------------------------------------------------  ROS:  CV: chest pain (-), palpitation (-), orthopnea (-), PND (-), edema (-)  PULM: SOB (-), cough (-), wheezing (-), hemoptysis (-).   CONST: fever (-), chills (-) or fatigue (-)  GI: abdominal distension (-), abdominal pain (-) , nausea/vomiting (-), hematemesis, (-), melena (-), hematochezia (-)  : dysuria (-), frequency (-), hematuria (-).   NEURO: numbness (-), weakness (-), dizziness (-)  SKIN: itching (-), rash (-)  HEENT:  visual changes (-); vertigo or throat pain (-);  neck stiffness (-)     All other review of systems is negative unless indicated above.   -------------------------------------------------------------------------------------------  VS:  T(F): 97.7 (), Max: 98.6 ()  HR: 74 () (69 - 77)  BP: 129/86 () (126/75 - 142/70)  RR: 17 ()  SpO2: 99% ()  I&O's Summary    ------------------------------------------------------------------------------------------  PHYSICAL EXAM:  Appearance: No acute distress; well appearing  Eyes: EOMI, no scleral icterus   HEENT: Normal oral mucosa  Cardiovascular: RRR, S1, S2, no murmurs, rubs, or gallops; no edema; no JVD  Respiratory: Clear to auscultation bilaterally, no auditory stridor   Gastrointestinal: soft, non-tender, non-distended with normal bowel sounds  Musculoskeletal: No clubbing; no joint deformity   Neurologic: Non-focal  Lymphatic: No lymphadenopathy  Psychiatry: AAOx3, mood & affect appropriate  Skin: No rashes, ecchymoses, or cyanosis    -------------------------------------------------------------------------------------------  LABS:                          15.2   9.33  )-----------( 314      ( 03 Sep 2021 23:16 )             45.3         139  |  100  |  13  ----------------------------<  175<H>  4.4   |  22  |  0.84    Ca    9.0      03 Sep 2021 07:38    TPro  8.3  /  Alb  4.1  /  TBili  1.3<H>  /  DBili  x   /  AST  49<H>  /  ALT  29  /  AlkPhos  68      PT/INR - ( 02 Sep 2021 11:14 )   PT: 11.8 sec;   INR: 1.03 ratio         PTT - ( 03 Sep 2021 23:16 )  PTT:48.1 sec  CARDIAC MARKERS ( 03 Sep 2021 07:38 )  740 ng/L / x     / x     / 142 U/L / x     / 15.2 ng/mL  CARDIAC MARKERS ( 02 Sep 2021 21:34 )  962 ng/L / x     / x     / 185 U/L / x     / 19.9 ng/mL  CARDIAC MARKERS ( 02 Sep 2021 15:16 )  545 ng/L / x     / x     / 197 U/L / x     / 22.5 ng/mL  CARDIAC MARKERS ( 02 Sep 2021 10:13 )  329 ng/L / x     / x     / x     / x     / x          Total Cholesterol: 165  LDL: --  HDL: 42  T        -------------------------------------------------------------------------------------------  Meds:  aspirin enteric coated 81 milliGRAM(s) Oral daily  atorvastatin 80 milliGRAM(s) Oral at bedtime  carvedilol 3.125 milliGRAM(s) Oral every 12 hours  dextrose 40% Gel 15 Gram(s) Oral once  dextrose 5%. 1000 milliLiter(s) IV Continuous <Continuous>  dextrose 5%. 1000 milliLiter(s) IV Continuous <Continuous>  dextrose 50% Injectable 25 Gram(s) IV Push once  dextrose 50% Injectable 12.5 Gram(s) IV Push once  dextrose 50% Injectable 25 Gram(s) IV Push once  glucagon  Injectable 1 milliGRAM(s) IntraMuscular once  heparin   Injectable 4000 Unit(s) IV Push every 6 hours PRN  heparin  Infusion. 600 Unit(s)/Hr IV Continuous <Continuous>  insulin glargine Injectable (LANTUS) 15 Unit(s) SubCutaneous at bedtime  insulin lispro (ADMELOG) corrective regimen sliding scale   SubCutaneous three times a day before meals  insulin lispro (ADMELOG) corrective regimen sliding scale   SubCutaneous at bedtime  insulin lispro Injectable (ADMELOG) 5 Unit(s) SubCutaneous three times a day before meals    -------------------------------------------------------------------------------------------  < from: Cardiac Catherization (21 @ 08:56) >  LM:   --  Mid left main: There was a discrete 40 % stenosis at a site with  no prior intervention. There was VALORIE grade 3 flow through the vessel  (brisk flow).  LAD:   --  Proximal LAD: There was a tubular 80 % stenosis at a site with  no prior intervention. There was VALORIE grade 3 flow through the vessel  (brisk flow).  CX:   --  OM1: There was a diffuse 95 % stenosis at a site withno prior  intervention. There was VALORIE grade 3 flow through the vessel (brisk flow).  RCA:   --  Mid RCA: There was a discrete 95 % stenosis at a site with no  prior intervention. There was VALORIE grade 3 flow through the vessel (brisk  flow).    < end of copied text >

## 2021-09-04 NOTE — PROGRESS NOTE ADULT - PROBLEM SELECTOR PLAN 5
DVT ppx: Hep gtt DVT ppx: Hep gtt  Dispo: Transfer to [facility] today, planning 40 min coordinating care

## 2021-09-04 NOTE — PROGRESS NOTE ADULT - PROBLEM SELECTOR PLAN 2
new diagnosis   A1c 9.1  start on lantus 15 unit and premeal 5 u   will need diabetic education, supplies, prior to discharge new diagnosis   A1c 9.1  start on lantus 15 unit and premeal 5 u  ** will need diabetic education, supplies/glucometer, prior to discharge

## 2021-09-04 NOTE — PROGRESS NOTE ADULT - ASSESSMENT
72yo M with no known PMH who presents with chest pain, found to have troponin of 329, admitted for NSTEMI. Cath showed severe multivessel CAD, plan for transfer to [facility] for CTS evaluation.     #Coronary artery disease - 3v disease on cath   #NSTEMI - VALORIE 3, ABILIO 101   #Hypertension - SBP 170s, repeat 150s    Plan:  -Continue ASA, hold P2Y12 for likely CABG  -Continue Atorvastatin, Coreg   -Continue heparin gtt  -Maintain on telemetry   -Pending transfer to [facility] for CTS ling 71M with no recent contact with healthcare system, newly diagnosed HTN, DM2 (HbA1c) during this admission, presents with NSTEMI s/p [facility] with multivessel disease now awaiting CT surgery evaluation and transfer to [facility]. Patient currently feels well. Denies any chest pain. Telemetry with NSR without any arrhythmias.  His BP is adequately controlled at 129/86.     Plan:  1. Continue ASA, atorvastatin 80, Coreg 3.125 mg bid. Hold P2Y12 for likely CABG  2. Continue heparin gtt  3. Follow up Echocardiogram   4. Appreciate endocrine recommendations.

## 2021-09-04 NOTE — H&P ADULT - ATTENDING COMMENTS
NSTMI, Severe TVD. Risks/benfits CABG medical therapy and incomplete PCI D/w pt.  STS risk 1-2%.  PT undecided about surgery at this time

## 2021-09-04 NOTE — PROGRESS NOTE ADULT - ATTENDING COMMENTS
Personally saw and examined patient  labs and vitals reviewed  agree with above assessment and plan  s/p cath with TVD  plan to tx to [facility] for cath (Dr Robert Pallazo)  cont asa, bb, statin  hold plavix  monitor on tele  check echo
Awaiting transfer to [facility] for CABG.

## 2021-09-04 NOTE — DISCHARGE NOTE NURSING/CASE MANAGEMENT/SOCIAL WORK - PATIENT PORTAL LINK FT
You can access the FollowMyHealth Patient Portal offered by [facility] by registering at the following website: [email or web address]. By joining [employer]’s FollowMyHealth portal, you will also be able to view your health information using other applications (apps) compatible with our system.

## 2021-09-04 NOTE — PROGRESS NOTE ADULT - SUBJECTIVE AND OBJECTIVE BOX
Patient is a 71y old  Male who presents with a chief complaint of Chest pain (04 Sep 2021 08:07)    Landry Davison MD   [facility] Medicine   Pager [phone]    SUBJECTIVE / OVERNIGHT EVENTS:  Patient seen and examined today.    MEDICATIONS  (STANDING):  aspirin enteric coated 81 milliGRAM(s) Oral daily  atorvastatin 80 milliGRAM(s) Oral at bedtime  carvedilol 3.125 milliGRAM(s) Oral every 12 hours  dextrose 40% Gel 15 Gram(s) Oral once  dextrose 5%. 1000 milliLiter(s) (50 mL/Hr) IV Continuous <Continuous>  dextrose 5%. 1000 milliLiter(s) (100 mL/Hr) IV Continuous <Continuous>  dextrose 50% Injectable 25 Gram(s) IV Push once  dextrose 50% Injectable 12.5 Gram(s) IV Push once  dextrose 50% Injectable 25 Gram(s) IV Push once  glucagon  Injectable 1 milliGRAM(s) IntraMuscular once  heparin  Infusion. 600 Unit(s)/Hr (6 mL/Hr) IV Continuous <Continuous>  insulin glargine Injectable (LANTUS) 15 Unit(s) SubCutaneous at bedtime  insulin lispro (ADMELOG) corrective regimen sliding scale   SubCutaneous three times a day before meals  insulin lispro (ADMELOG) corrective regimen sliding scale   SubCutaneous at bedtime  insulin lispro Injectable (ADMELOG) 5 Unit(s) SubCutaneous three times a day before meals    MEDICATIONS  (PRN):  heparin   Injectable 4000 Unit(s) IV Push every 6 hours PRN For aPTT less than 40      Vital Signs Last 24 Hrs  T(C): 36.5 (04 Sep 2021 05:20), Max: 37 (03 Sep 2021 21:20)  T(F): 97.7 (04 Sep 2021 05:20), Max: 98.6 (03 Sep 2021 21:20)  HR: 74 (04 Sep 2021 05:20) (69 - 77)  BP: 129/86 (04 Sep 2021 05:20) (126/75 - 142/70)  BP(mean): --  RR: 17 (04 Sep 2021 05:20) (17 - 17)  SpO2: 99% (04 Sep 2021 05:20) (98% - 100%)  CAPILLARY BLOOD GLUCOSE      POCT Blood Glucose.: 161 mg/dL (04 Sep 2021 07:39)  POCT Blood Glucose.: 119 mg/dL (03 Sep 2021 20:58)  POCT Blood Glucose.: 161 mg/dL (03 Sep 2021 16:45)  POCT Blood Glucose.: 228 mg/dL (03 Sep 2021 11:40)    I&O's Summary      Constitutional: middle aged man in bed in NAD, awake and alert  EYES: normal sclera  HEENT:  NCAT, moist oral mucosa   Respiratory: no respiratory distress, Breath sounds are clear bilaterally. No wheezing, rales or rhonchi  Cardiovascular: S1 and S2, regular rate and rhythm, no murmurs. peripheral pulses palpable x4  Gastrointestinal: Soft, nontender, nondistended. +BS  Extremities: No lower extremity edema, no calf tenderness, warm to touch  Skin: No rashes, No erythema   Psych: Alert and Oriented x3, normal mood, normal affect    LABS:                        15.3   10.24 )-----------( 309      ( 04 Sep 2021 08:07 )             45.4     09-03    139  |  100  |  13  ----------------------------<  175<H>  4.4   |  22  |  0.84    Ca    9.0      03 Sep 2021 07:38    TPro  8.3  /  Alb  4.1  /  TBili  1.3<H>  /  DBili  x   /  AST  49<H>  /  ALT  29  /  AlkPhos  68  09-02    PT/INR - ( 02 Sep 2021 11:14 )   PT: 11.8 sec;   INR: 1.03 ratio         PTT - ( 03 Sep 2021 23:16 )  PTT:48.1 sec  CARDIAC MARKERS ( 03 Sep 2021 07:38 )  x     / x     / 142 U/L / x     / 15.2 ng/mL  CARDIAC MARKERS ( 02 Sep 2021 21:34 )  x     / x     / 185 U/L / x     / 19.9 ng/mL  CARDIAC MARKERS ( 02 Sep 2021 15:16 )  x     / x     / 197 U/L / x     / 22.5 ng/mL      Urinalysis Basic - ( 02 Sep 2021 15:16 )    Color: Light Yellow / Appearance: Clear / S.013 / pH: x  Gluc: x / Ketone: Negative  / Bili: Negative / Urobili: <2 mg/dL   Blood: x / Protein: 30 mg/dL / Nitrite: Negative   Leuk Esterase: Negative / RBC: 3 /HPF / WBC 0 /HPF   Sq Epi: x / Non Sq Epi: 0 /HPF / Bacteria: Negative        RADIOLOGY & ADDITIONAL TESTS:    Imaging Personally Reviewed:    Consultant(s) Notes Reviewed:      Care Discussed with Consultants/Other Providers:   Patient is a 71y old  Male who presents with a chief complaint of Chest pain (04 Sep 2021 08:07)    Landry Davison MD   [facility] Medicine   Pager [phone]    SUBJECTIVE / OVERNIGHT EVENTS:  Patient seen and examined today. Feels well, no overnight events.     MEDICATIONS  (STANDING):  aspirin enteric coated 81 milliGRAM(s) Oral daily  atorvastatin 80 milliGRAM(s) Oral at bedtime  carvedilol 3.125 milliGRAM(s) Oral every 12 hours  dextrose 40% Gel 15 Gram(s) Oral once  dextrose 5%. 1000 milliLiter(s) (50 mL/Hr) IV Continuous <Continuous>  dextrose 5%. 1000 milliLiter(s) (100 mL/Hr) IV Continuous <Continuous>  dextrose 50% Injectable 25 Gram(s) IV Push once  dextrose 50% Injectable 12.5 Gram(s) IV Push once  dextrose 50% Injectable 25 Gram(s) IV Push once  glucagon  Injectable 1 milliGRAM(s) IntraMuscular once  heparin  Infusion. 600 Unit(s)/Hr (6 mL/Hr) IV Continuous <Continuous>  insulin glargine Injectable (LANTUS) 15 Unit(s) SubCutaneous at bedtime  insulin lispro (ADMELOG) corrective regimen sliding scale   SubCutaneous three times a day before meals  insulin lispro (ADMELOG) corrective regimen sliding scale   SubCutaneous at bedtime  insulin lispro Injectable (ADMELOG) 5 Unit(s) SubCutaneous three times a day before meals    MEDICATIONS  (PRN):  heparin   Injectable 4000 Unit(s) IV Push every 6 hours PRN For aPTT less than 40      Vital Signs Last 24 Hrs  T(C): 36.5 (04 Sep 2021 05:20), Max: 37 (03 Sep 2021 21:20)  T(F): 97.7 (04 Sep 2021 05:20), Max: 98.6 (03 Sep 2021 21:20)  HR: 74 (04 Sep 2021 05:20) (69 - 77)  BP: 129/86 (04 Sep 2021 05:20) (126/75 - 142/70)  BP(mean): --  RR: 17 (04 Sep 2021 05:20) (17 - 17)  SpO2: 99% (04 Sep 2021 05:20) (98% - 100%)  CAPILLARY BLOOD GLUCOSE      POCT Blood Glucose.: 161 mg/dL (04 Sep 2021 07:39)  POCT Blood Glucose.: 119 mg/dL (03 Sep 2021 20:58)  POCT Blood Glucose.: 161 mg/dL (03 Sep 2021 16:45)  POCT Blood Glucose.: 228 mg/dL (03 Sep 2021 11:40)    I&O's Summary      Constitutional: middle aged man in bed in NAD, awake and alert  EYES: normal sclera  HEENT:  NCAT, moist oral mucosa   Respiratory: no respiratory distress, Breath sounds are clear bilaterally. No wheezing, rales or rhonchi  Cardiovascular: S1 and S2, regular rate and rhythm, no murmurs. peripheral pulses palpable x4  Gastrointestinal: Soft, nontender, nondistended. +BS  Extremities: No lower extremity edema, no calf tenderness, warm to touch  Skin: No rashes, No erythema   Psych: Alert and Oriented x3, normal mood, normal affect    LABS:                        15.3   10.24 )-----------( 309      ( 04 Sep 2021 08:07 )             45.4     -    139  |  100  |  13  ----------------------------<  175<H>  4.4   |  22  |  0.84    Ca    9.0      03 Sep 2021 07:38    TPro  8.3  /  Alb  4.1  /  TBili  1.3<H>  /  DBili  x   /  AST  49<H>  /  ALT  29  /  AlkPhos  68  09-02    PT/INR - ( 02 Sep 2021 11:14 )   PT: 11.8 sec;   INR: 1.03 ratio         PTT - ( 03 Sep 2021 23:16 )  PTT:48.1 sec  CARDIAC MARKERS ( 03 Sep 2021 07:38 )  x     / x     / 142 U/L / x     / 15.2 ng/mL  CARDIAC MARKERS ( 02 Sep 2021 21:34 )  x     / x     / 185 U/L / x     / 19.9 ng/mL  CARDIAC MARKERS ( 02 Sep 2021 15:16 )  x     / x     / 197 U/L / x     / 22.5 ng/mL      Urinalysis Basic - ( 02 Sep 2021 15:16 )    Color: Light Yellow / Appearance: Clear / S.013 / pH: x  Gluc: x / Ketone: Negative  / Bili: Negative / Urobili: <2 mg/dL   Blood: x / Protein: 30 mg/dL / Nitrite: Negative   Leuk Esterase: Negative / RBC: 3 /HPF / WBC 0 /HPF   Sq Epi: x / Non Sq Epi: 0 /HPF / Bacteria: Negative        RADIOLOGY & ADDITIONAL TESTS:    Imaging Personally Reviewed:    Consultant(s) Notes Reviewed:      Care Discussed with Consultants/Other Providers: JOANNE Myrick

## 2021-09-04 NOTE — PROGRESS NOTE ADULT - PROBLEM SELECTOR PLAN 1
# 3V CAD   - Trops [phone]   - Cath showed severe multivessel disease   - loaded with ASA and Brilinta in ED, d/c Brilinta as will need CABG  - c/w heparin gtt   - cardiology following   - c/w Coreg 3.125 mg PO q12h and atorvastatin 80mg qhs  [ ] echo # 3V CAD   - Trops [phone]   - Cath showed severe multivessel disease   - loaded with ASA and Brilinta in ED, d/c Brilinta as will need CABG  - c/w heparin gtt   - cardiology following   - c/w Coreg 3.125 mg PO q12h and atorvastatin 80mg qhs  - echo showed preserved EF, with mild AI

## 2021-09-04 NOTE — H&P ADULT - HISTORY OF PRESENT ILLNESS
70 y/o male w/ no reported pmhx presents w/ 2 episodes of unprovoked chest pain since this morning found to have elevated cardiac enzymes with abnormal EKG, admitted to [facility] for NSTEMI. Had cardiac cath that showed 3v disease and is now planned for CABG eval at [facility].

## 2021-09-04 NOTE — PROGRESS NOTE ADULT - ASSESSMENT
70 y/o male w/ no reported pmhx presents w/ 2 episodes of unprovoked chest pain since this morning found to have elevated cardiac enzymes with abnormal EKG, admitted to tele for NSTEMI. 72 y/o male w/ no reported pmhx presents w/ 2 episodes of unprovoked chest pain since this morning found to have elevated cardiac enzymes with abnormal EKG, admitted to [facility] for NSTEMI. Had cardiac cath that showed 3v disease and is now planned for CABG eval at [facility].

## 2021-09-05 DIAGNOSIS — I25.10 ATHEROSCLEROTIC HEART DISEASE OF NATIVE CORONARY ARTERY WITHOUT ANGINA PECTORIS: ICD-10-CM

## 2021-09-05 DIAGNOSIS — E11.9 TYPE 2 DIABETES MELLITUS WITHOUT COMPLICATIONS: ICD-10-CM

## 2021-09-05 LAB
A1C WITH ESTIMATED AVERAGE GLUCOSE RESULT: 8.9 % — HIGH (ref 4–5.6)
ANION GAP SERPL CALC-SCNC: 12 MMOL/L — SIGNIFICANT CHANGE UP (ref 5–17)
APPEARANCE UR: CLEAR — SIGNIFICANT CHANGE UP
APTT BLD: 78 SEC — HIGH (ref 27.5–35.5)
BACTERIA # UR AUTO: NEGATIVE — SIGNIFICANT CHANGE UP
BILIRUB UR-MCNC: NEGATIVE — SIGNIFICANT CHANGE UP
BUN SERPL-MCNC: 16 MG/DL — SIGNIFICANT CHANGE UP (ref 7–23)
CALCIUM SERPL-MCNC: 9.5 MG/DL — SIGNIFICANT CHANGE UP (ref 8.4–10.5)
CHLORIDE SERPL-SCNC: 100 MMOL/L — SIGNIFICANT CHANGE UP (ref 96–108)
CO2 SERPL-SCNC: 24 MMOL/L — SIGNIFICANT CHANGE UP (ref 22–31)
COLOR SPEC: YELLOW — SIGNIFICANT CHANGE UP
CREAT SERPL-MCNC: 1.22 MG/DL — SIGNIFICANT CHANGE UP (ref 0.5–1.3)
DIFF PNL FLD: NEGATIVE — SIGNIFICANT CHANGE UP
EPI CELLS # UR: 0 /HPF — SIGNIFICANT CHANGE UP
ESTIMATED AVERAGE GLUCOSE: 209 MG/DL — HIGH (ref 68–114)
FIBRINOGEN PPP-MCNC: 658 MG/DL — HIGH (ref 290–520)
GLUCOSE BLDC GLUCOMTR-MCNC: 105 MG/DL — HIGH (ref 70–99)
GLUCOSE BLDC GLUCOMTR-MCNC: 106 MG/DL — HIGH (ref 70–99)
GLUCOSE BLDC GLUCOMTR-MCNC: 111 MG/DL — HIGH (ref 70–99)
GLUCOSE BLDC GLUCOMTR-MCNC: 113 MG/DL — HIGH (ref 70–99)
GLUCOSE SERPL-MCNC: 122 MG/DL — HIGH (ref 70–99)
GLUCOSE UR QL: ABNORMAL
HCT VFR BLD CALC: 45.7 % — SIGNIFICANT CHANGE UP (ref 39–50)
HGB BLD-MCNC: 14.7 G/DL — SIGNIFICANT CHANGE UP (ref 13–17)
HYALINE CASTS # UR AUTO: 2 /LPF — SIGNIFICANT CHANGE UP (ref 0–2)
KETONES UR-MCNC: NEGATIVE — SIGNIFICANT CHANGE UP
LEUKOCYTE ESTERASE UR-ACNC: NEGATIVE — SIGNIFICANT CHANGE UP
MAGNESIUM SERPL-MCNC: 2.1 MG/DL — SIGNIFICANT CHANGE UP (ref 1.6–2.6)
MCHC RBC-ENTMCNC: 24.9 PG — LOW (ref 27–34)
MCHC RBC-ENTMCNC: 32.2 GM/DL — SIGNIFICANT CHANGE UP (ref 32–36)
MCV RBC AUTO: 77.5 FL — LOW (ref 80–100)
MRSA PCR RESULT.: SIGNIFICANT CHANGE UP
NITRITE UR-MCNC: NEGATIVE — SIGNIFICANT CHANGE UP
NRBC # BLD: 0 /100 WBCS — SIGNIFICANT CHANGE UP (ref 0–0)
NT-PROBNP SERPL-SCNC: 228 PG/ML — SIGNIFICANT CHANGE UP (ref 0–300)
PA ADP PRP-ACNC: 39 PRU — LOW (ref 194–417)
PH UR: 5.5 — SIGNIFICANT CHANGE UP (ref 5–8)
PHOSPHATE SERPL-MCNC: 3.5 MG/DL — SIGNIFICANT CHANGE UP (ref 2.5–4.5)
PLATELET # BLD AUTO: 321 K/UL — SIGNIFICANT CHANGE UP (ref 150–400)
POTASSIUM SERPL-MCNC: 4.5 MMOL/L — SIGNIFICANT CHANGE UP (ref 3.5–5.3)
POTASSIUM SERPL-SCNC: 4.5 MMOL/L — SIGNIFICANT CHANGE UP (ref 3.5–5.3)
PROT UR-MCNC: ABNORMAL
RBC # BLD: 5.9 M/UL — HIGH (ref 4.2–5.8)
RBC # FLD: 14.9 % — HIGH (ref 10.3–14.5)
RBC CASTS # UR COMP ASSIST: 0 /HPF — SIGNIFICANT CHANGE UP (ref 0–4)
S AUREUS DNA NOSE QL NAA+PROBE: SIGNIFICANT CHANGE UP
SODIUM SERPL-SCNC: 136 MMOL/L — SIGNIFICANT CHANGE UP (ref 135–145)
SP GR SPEC: 1.02 — SIGNIFICANT CHANGE UP (ref 1.01–1.02)
TSH SERPL-MCNC: 6 UIU/ML — HIGH (ref 0.27–4.2)
UROBILINOGEN FLD QL: NEGATIVE — SIGNIFICANT CHANGE UP
WBC # BLD: 10.06 K/UL — SIGNIFICANT CHANGE UP (ref 3.8–10.5)
WBC # FLD AUTO: 10.06 K/UL — SIGNIFICANT CHANGE UP (ref 3.8–10.5)
WBC UR QL: 1 /HPF — SIGNIFICANT CHANGE UP (ref 0–5)

## 2021-09-05 PROCEDURE — 93010 ELECTROCARDIOGRAM REPORT: CPT

## 2021-09-05 PROCEDURE — 99232 SBSQ HOSP IP/OBS MODERATE 35: CPT

## 2021-09-05 RX ADMIN — Medication 5 UNIT(S): at 17:21

## 2021-09-05 RX ADMIN — SODIUM CHLORIDE 3 MILLILITER(S): 9 INJECTION INTRAMUSCULAR; INTRAVENOUS; SUBCUTANEOUS at 14:04

## 2021-09-05 RX ADMIN — INSULIN GLARGINE 15 UNIT(S): 100 INJECTION, SOLUTION SUBCUTANEOUS at 21:44

## 2021-09-05 RX ADMIN — Medication 25 MILLIGRAM(S): at 17:22

## 2021-09-05 RX ADMIN — ATORVASTATIN CALCIUM 80 MILLIGRAM(S): 80 TABLET, FILM COATED ORAL at 21:34

## 2021-09-05 RX ADMIN — Medication 5 UNIT(S): at 12:20

## 2021-09-05 RX ADMIN — PANTOPRAZOLE SODIUM 40 MILLIGRAM(S): 20 TABLET, DELAYED RELEASE ORAL at 06:11

## 2021-09-05 RX ADMIN — Medication 5 UNIT(S): at 08:21

## 2021-09-05 RX ADMIN — SODIUM CHLORIDE 3 MILLILITER(S): 9 INJECTION INTRAMUSCULAR; INTRAVENOUS; SUBCUTANEOUS at 21:35

## 2021-09-05 RX ADMIN — Medication 25 MILLIGRAM(S): at 08:24

## 2021-09-05 RX ADMIN — HEPARIN SODIUM 7.5 UNIT(S)/HR: 5000 INJECTION INTRAVENOUS; SUBCUTANEOUS at 08:25

## 2021-09-05 RX ADMIN — SODIUM CHLORIDE 3 MILLILITER(S): 9 INJECTION INTRAMUSCULAR; INTRAVENOUS; SUBCUTANEOUS at 06:09

## 2021-09-05 RX ADMIN — Medication 81 MILLIGRAM(S): at 12:19

## 2021-09-05 NOTE — PROGRESS NOTE ADULT - SUBJECTIVE AND OBJECTIVE BOX
VITAL SIGNS    Telemetry:    sr    Vital Signs Last 24 Hrs  T(C): 36.9 (21 @ 12:25), Max: 36.9 (21 @ 20:18)  T(F): 98.4 (21 @ 12:25), Max: 98.4 (21 @ 20:18)  HR: 70 (21 @ 12:25) (70 - 78)  BP: 123/78 (21 @ 12:25) (123/78 - 160/92)  RR: 18 (21 @ 12:25) (17 - 18)  SpO2: 98% (21 @ 12:25) (96% - 100%)                   Daily Height in cm: 167.64 (04 Sep 2021 17:27)    Daily Weight in k (05 Sep 2021 10:45)      Bilirubin Total, Serum: 1.4 mg/dL ( @ 22:25)    CAPILLARY BLOOD GLUCOSE      POCT Blood Glucose.: 105 mg/dL (05 Sep 2021 11:26)  POCT Blood Glucose.: 113 mg/dL (05 Sep 2021 07:30)  POCT Blood Glucose.: 192 mg/dL (04 Sep 2021 23:34)  POCT Blood Glucose.: 111 mg/dL (04 Sep 2021 20:42)  POCT Blood Glucose.: 110 mg/dL (04 Sep 2021 16:40)                    PHYSICAL EXAM  s   NO SOB  No chest pain"  Neurology: alert and oriented x 3, moves all extremities with no defecits  CV :  RRR    Lungs:   CTA B/L  Abdomen: soft, nontender, nondistended, positive bowel sounds,  Extremities:       no edema  no calf tenderness

## 2021-09-05 NOTE — PROGRESS NOTE ADULT - ASSESSMENT
71   yr old male   admit from [facility]  after cardiac revealed   CAD   , patient with HgAic of 9  pt unaware he was a diabetic and no other significant history  9/5   vss  hep gtt endo cslt for DM2   p2y12  39

## 2021-09-06 LAB
ALBUMIN SERPL ELPH-MCNC: 3.8 G/DL — SIGNIFICANT CHANGE UP (ref 3.3–5)
ALP SERPL-CCNC: 83 U/L — SIGNIFICANT CHANGE UP (ref 40–120)
ALT FLD-CCNC: 36 U/L — SIGNIFICANT CHANGE UP (ref 10–45)
ANION GAP SERPL CALC-SCNC: 13 MMOL/L — SIGNIFICANT CHANGE UP (ref 5–17)
ANION GAP SERPL CALC-SCNC: 14 MMOL/L — SIGNIFICANT CHANGE UP (ref 5–17)
APTT BLD: 85.4 SEC — HIGH (ref 27.5–35.5)
AST SERPL-CCNC: 34 U/L — SIGNIFICANT CHANGE UP (ref 10–40)
BILIRUB SERPL-MCNC: 1 MG/DL — SIGNIFICANT CHANGE UP (ref 0.2–1.2)
BUN SERPL-MCNC: 24 MG/DL — HIGH (ref 7–23)
BUN SERPL-MCNC: 24 MG/DL — HIGH (ref 7–23)
CALCIUM SERPL-MCNC: 9.6 MG/DL — SIGNIFICANT CHANGE UP (ref 8.4–10.5)
CALCIUM SERPL-MCNC: 9.7 MG/DL — SIGNIFICANT CHANGE UP (ref 8.4–10.5)
CHLORIDE SERPL-SCNC: 100 MMOL/L — SIGNIFICANT CHANGE UP (ref 96–108)
CHLORIDE SERPL-SCNC: 101 MMOL/L — SIGNIFICANT CHANGE UP (ref 96–108)
CO2 SERPL-SCNC: 19 MMOL/L — LOW (ref 22–31)
CO2 SERPL-SCNC: 20 MMOL/L — LOW (ref 22–31)
COVID-19 SPIKE DOMAIN AB INTERP: NEGATIVE — SIGNIFICANT CHANGE UP
COVID-19 SPIKE DOMAIN ANTIBODY RESULT: 0.4 U/ML — SIGNIFICANT CHANGE UP
CREAT SERPL-MCNC: 1.24 MG/DL — SIGNIFICANT CHANGE UP (ref 0.5–1.3)
CREAT SERPL-MCNC: 1.28 MG/DL — SIGNIFICANT CHANGE UP (ref 0.5–1.3)
GLUCOSE BLDC GLUCOMTR-MCNC: 108 MG/DL — HIGH (ref 70–99)
GLUCOSE BLDC GLUCOMTR-MCNC: 111 MG/DL — HIGH (ref 70–99)
GLUCOSE BLDC GLUCOMTR-MCNC: 96 MG/DL — SIGNIFICANT CHANGE UP (ref 70–99)
GLUCOSE BLDC GLUCOMTR-MCNC: 96 MG/DL — SIGNIFICANT CHANGE UP (ref 70–99)
GLUCOSE SERPL-MCNC: 101 MG/DL — HIGH (ref 70–99)
GLUCOSE SERPL-MCNC: 104 MG/DL — HIGH (ref 70–99)
HCT VFR BLD CALC: 46.2 % — SIGNIFICANT CHANGE UP (ref 39–50)
HGB BLD-MCNC: 14.9 G/DL — SIGNIFICANT CHANGE UP (ref 13–17)
MCHC RBC-ENTMCNC: 25.3 PG — LOW (ref 27–34)
MCHC RBC-ENTMCNC: 32.3 GM/DL — SIGNIFICANT CHANGE UP (ref 32–36)
MCV RBC AUTO: 78.6 FL — LOW (ref 80–100)
NRBC # BLD: 0 /100 WBCS — SIGNIFICANT CHANGE UP (ref 0–0)
PA ADP PRP-ACNC: 108 PRU — LOW (ref 194–417)
PLATELET # BLD AUTO: 311 K/UL — SIGNIFICANT CHANGE UP (ref 150–400)
POTASSIUM SERPL-MCNC: 4.2 MMOL/L — SIGNIFICANT CHANGE UP (ref 3.5–5.3)
POTASSIUM SERPL-MCNC: 4.5 MMOL/L — SIGNIFICANT CHANGE UP (ref 3.5–5.3)
POTASSIUM SERPL-SCNC: 4.2 MMOL/L — SIGNIFICANT CHANGE UP (ref 3.5–5.3)
POTASSIUM SERPL-SCNC: 4.5 MMOL/L — SIGNIFICANT CHANGE UP (ref 3.5–5.3)
PROT SERPL-MCNC: 7.7 G/DL — SIGNIFICANT CHANGE UP (ref 6–8.3)
RBC # BLD: 5.88 M/UL — HIGH (ref 4.2–5.8)
RBC # FLD: 14.5 % — SIGNIFICANT CHANGE UP (ref 10.3–14.5)
SARS-COV-2 IGG+IGM SERPL QL IA: 0.4 U/ML — SIGNIFICANT CHANGE UP
SARS-COV-2 IGG+IGM SERPL QL IA: NEGATIVE — SIGNIFICANT CHANGE UP
SODIUM SERPL-SCNC: 132 MMOL/L — LOW (ref 135–145)
SODIUM SERPL-SCNC: 135 MMOL/L — SIGNIFICANT CHANGE UP (ref 135–145)
WBC # BLD: 11.49 K/UL — HIGH (ref 3.8–10.5)
WBC # FLD AUTO: 11.49 K/UL — HIGH (ref 3.8–10.5)

## 2021-09-06 PROCEDURE — 99232 SBSQ HOSP IP/OBS MODERATE 35: CPT

## 2021-09-06 RX ADMIN — Medication 5 UNIT(S): at 11:36

## 2021-09-06 RX ADMIN — Medication 5 UNIT(S): at 17:03

## 2021-09-06 RX ADMIN — SODIUM CHLORIDE 3 MILLILITER(S): 9 INJECTION INTRAMUSCULAR; INTRAVENOUS; SUBCUTANEOUS at 12:38

## 2021-09-06 RX ADMIN — ATORVASTATIN CALCIUM 80 MILLIGRAM(S): 80 TABLET, FILM COATED ORAL at 22:27

## 2021-09-06 RX ADMIN — HEPARIN SODIUM 7 UNIT(S)/HR: 5000 INJECTION INTRAVENOUS; SUBCUTANEOUS at 10:06

## 2021-09-06 RX ADMIN — Medication 25 MILLIGRAM(S): at 17:05

## 2021-09-06 RX ADMIN — PANTOPRAZOLE SODIUM 40 MILLIGRAM(S): 20 TABLET, DELAYED RELEASE ORAL at 06:29

## 2021-09-06 RX ADMIN — Medication 5 UNIT(S): at 07:49

## 2021-09-06 RX ADMIN — INSULIN GLARGINE 15 UNIT(S): 100 INJECTION, SOLUTION SUBCUTANEOUS at 22:28

## 2021-09-06 RX ADMIN — Medication 81 MILLIGRAM(S): at 11:49

## 2021-09-06 RX ADMIN — SODIUM CHLORIDE 3 MILLILITER(S): 9 INJECTION INTRAMUSCULAR; INTRAVENOUS; SUBCUTANEOUS at 22:00

## 2021-09-06 RX ADMIN — Medication 25 MILLIGRAM(S): at 06:29

## 2021-09-06 RX ADMIN — SODIUM CHLORIDE 3 MILLILITER(S): 9 INJECTION INTRAMUSCULAR; INTRAVENOUS; SUBCUTANEOUS at 06:32

## 2021-09-06 NOTE — PROGRESS NOTE ADULT - SUBJECTIVE AND OBJECTIVE BOX
VITAL SIGNS    Telemetry:    Vital Signs Last 24 Hrs  T(C): 36.7 (09-06-21 @ 04:30), Max: 36.9 (09-05-21 @ 12:25)  T(F): 98.1 (09-06-21 @ 04:30), Max: 98.4 (09-05-21 @ 12:25)  HR: 95 (09-06-21 @ 04:30) (70 - 95)  BP: 167/98 (09-06-21 @ 04:30) (123/78 - 167/98)  RR: 18 (09-06-21 @ 04:30) (18 - 18)  SpO2: 95% (09-06-21 @ 04:30) (95% - 98%)            09-05 @ 07:01  -  09-06 @ 07:00  --------------------------------------------------------  IN: 1220 mL / OUT: 1700 mL / NET: -480 mL    09-06 @ 07:01  -  09-06 @ 11:39  --------------------------------------------------------  IN: 334.5 mL / OUT: 0 mL / NET: 334.5 mL       Daily     Daily   Admit Wt: Drug Dosing Weight  Height (cm): 167.6 (04 Sep 2021 17:27)  Weight (kg): 68.3 (04 Sep 2021 17:27)  BMI (kg/m2): 24.3 (04 Sep 2021 17:27)  BSA (m2): 1.77 (04 Sep 2021 17:27)    Bilirubin Total, Serum: 1.0 mg/dL (09-06 @ 06:09)    CAPILLARY BLOOD GLUCOSE      POCT Blood Glucose.: 108 mg/dL (06 Sep 2021 11:31)  POCT Blood Glucose.: 96 mg/dL (06 Sep 2021 07:21)  POCT Blood Glucose.: 111 mg/dL (05 Sep 2021 21:41)  POCT Blood Glucose.: 106 mg/dL (05 Sep 2021 16:53)          aspirin enteric coated 81 milliGRAM(s) Oral daily  atorvastatin 80 milliGRAM(s) Oral at bedtime  dextrose 40% Gel 15 Gram(s) Oral once  dextrose 50% Injectable 25 Gram(s) IV Push once  heparin  Infusion 750 Unit(s)/Hr IV Continuous <Continuous>  influenza   Vaccine 0.5 milliLiter(s) IntraMuscular once  insulin glargine Injectable (LANTUS) 15 Unit(s) SubCutaneous at bedtime  insulin lispro (ADMELOG) corrective regimen sliding scale   SubCutaneous three times a day before meals  insulin lispro (ADMELOG) corrective regimen sliding scale   SubCutaneous at bedtime  insulin lispro Injectable (ADMELOG) 5 Unit(s) SubCutaneous three times a day before meals  metoprolol tartrate 25 milliGRAM(s) Oral every 12 hours  pantoprazole    Tablet 40 milliGRAM(s) Oral before breakfast  sodium chloride 0.9% lock flush 3 milliLiter(s) IV Push every 8 hours      PHYSICAL EXAM    Subjective: "Hi.   Neurology: alert and oriented x 3, nonfocal, no gross deficits  CV : tele:  RSR  Sternal Wound :  CDI with dressing , Stable  Lungs: clear. RR easy, unlabored   Abdomen: soft, nontender, nondistended, positive bowel sounds, bowel movement   Neg N/V/D   :  pt voiding without difficulty   Extremities:   CARDENAS; edema, neg calf tenderness.   PPP bilaterally      PW:  Chest tubes:                 VITAL SIGNS    Telemetry:  rsr    Vital Signs Last 24 Hrs  T(C): 36.7 (09-06-21 @ 04:30), Max: 36.9 (09-05-21 @ 12:25)  T(F): 98.1 (09-06-21 @ 04:30), Max: 98.4 (09-05-21 @ 12:25)  HR: 95 (09-06-21 @ 04:30) (70 - 95)  BP: 167/98 (09-06-21 @ 04:30) (123/78 - 167/98)  RR: 18 (09-06-21 @ 04:30) (18 - 18)  SpO2: 95% (09-06-21 @ 04:30) (95% - 98%)            09-05 @ 07:01  -  09-06 @ 07:00  --------------------------------------------------------  IN: 1220 mL / OUT: 1700 mL / NET: -480 mL    09-06 @ 07:01  -  09-06 @ 11:39  --------------------------------------------------------  IN: 334.5 mL / OUT: 0 mL / NET: 334.5 mL       Daily     Daily   Admit Wt: Drug Dosing Weight  Height (cm): 167.6 (04 Sep 2021 17:27)  Weight (kg): 68.3 (04 Sep 2021 17:27)  BMI (kg/m2): 24.3 (04 Sep 2021 17:27)  BSA (m2): 1.77 (04 Sep 2021 17:27)    Bilirubin Total, Serum: 1.0 mg/dL (09-06 @ 06:09)    CAPILLARY BLOOD GLUCOSE      POCT Blood Glucose.: 108 mg/dL (06 Sep 2021 11:31)  POCT Blood Glucose.: 96 mg/dL (06 Sep 2021 07:21)  POCT Blood Glucose.: 111 mg/dL (05 Sep 2021 21:41)  POCT Blood Glucose.: 106 mg/dL (05 Sep 2021 16:53)          aspirin enteric coated 81 milliGRAM(s) Oral daily  atorvastatin 80 milliGRAM(s) Oral at bedtime  dextrose 40% Gel 15 Gram(s) Oral once  dextrose 50% Injectable 25 Gram(s) IV Push once  heparin  Infusion 750 Unit(s)/Hr IV Continuous <Continuous>  influenza   Vaccine 0.5 milliLiter(s) IntraMuscular once  insulin glargine Injectable (LANTUS) 15 Unit(s) SubCutaneous at bedtime  insulin lispro (ADMELOG) corrective regimen sliding scale   SubCutaneous three times a day before meals  insulin lispro (ADMELOG) corrective regimen sliding scale   SubCutaneous at bedtime  insulin lispro Injectable (ADMELOG) 5 Unit(s) SubCutaneous three times a day before meals  metoprolol tartrate 25 milliGRAM(s) Oral every 12 hours  pantoprazole    Tablet 40 milliGRAM(s) Oral before breakfast  sodium chloride 0.9% lock flush 3 milliLiter(s) IV Push every 8 hours      PHYSICAL EXAM    Subjective: "I feel ok."   Neurology: alert and oriented x 3, nonfocal, no gross deficits  CV : tele:  RSR    Lungs: clear. RR easy, unlabored   Abdomen: soft, nontender, nondistended, positive bowel sounds, + bowel movement   Neg N/V/D   :  pt voiding without difficulty   Extremities:   CARDENAS; neg LE edema, neg calf tenderness.   PPP bilaterally; rt radial site cdi jojo                   VITAL SIGNS    Telemetry:  rsr    Vital Signs Last 24 Hrs  T(C): 36.7 (09-06-21 @ 04:30), Max: 36.9 (09-05-21 @ 12:25)  T(F): 98.1 (09-06-21 @ 04:30), Max: 98.4 (09-05-21 @ 12:25)  HR: 95 (09-06-21 @ 04:30) (70 - 95)  BP: 167/98 (09-06-21 @ 04:30) (123/78 - 167/98)  RR: 18 (09-06-21 @ 04:30) (18 - 18)  SpO2: 95% (09-06-21 @ 04:30) (95% - 98%)            09-05 @ 07:01  -  09-06 @ 07:00  --------------------------------------------------------  IN: 1220 mL / OUT: 1700 mL / NET: -480 mL    09-06 @ 07:01  -  09-06 @ 11:39  --------------------------------------------------------  IN: 334.5 mL / OUT: 0 mL / NET: 334.5 mL       Daily     Daily   Admit Wt: Drug Dosing Weight  Height (cm): 167.6 (04 Sep 2021 17:27)  Weight (kg): 68.3 (04 Sep 2021 17:27)  BMI (kg/m2): 24.3 (04 Sep 2021 17:27)  BSA (m2): 1.77 (04 Sep 2021 17:27)    Bilirubin Total, Serum: 1.0 mg/dL (09-06 @ 06:09)    CAPILLARY BLOOD GLUCOSE      POCT Blood Glucose.: 108 mg/dL (06 Sep 2021 11:31)  POCT Blood Glucose.: 96 mg/dL (06 Sep 2021 07:21)  POCT Blood Glucose.: 111 mg/dL (05 Sep 2021 21:41)  POCT Blood Glucose.: 106 mg/dL (05 Sep 2021 16:53)          aspirin enteric coated 81 milliGRAM(s) Oral daily  atorvastatin 80 milliGRAM(s) Oral at bedtime  dextrose 40% Gel 15 Gram(s) Oral once  dextrose 50% Injectable 25 Gram(s) IV Push once  heparin  Infusion 750 Unit(s)/Hr IV Continuous <Continuous>  influenza   Vaccine 0.5 milliLiter(s) IntraMuscular once  insulin glargine Injectable (LANTUS) 15 Unit(s) SubCutaneous at bedtime  insulin lispro (ADMELOG) corrective regimen sliding scale   SubCutaneous three times a day before meals  insulin lispro (ADMELOG) corrective regimen sliding scale   SubCutaneous at bedtime  insulin lispro Injectable (ADMELOG) 5 Unit(s) SubCutaneous three times a day before meals  metoprolol tartrate 25 milliGRAM(s) Oral every 12 hours  pantoprazole    Tablet 40 milliGRAM(s) Oral before breakfast  sodium chloride 0.9% lock flush 3 milliLiter(s) IV Push every 8 hours      PHYSICAL EXAM    Subjective: "I feel ok."   Neurology: alert and oriented x 3, nonfocal, no gross deficits  CV : tele:  RSR    Lungs: clear. RR easy, unlabored   Abdomen: soft, nontender, nondistended, positive bowel sounds, + bowel movement   Neg N/V/D   :  pt voiding without difficulty   Extremities:   CARDENAS; neg LE edema, neg calf tenderness.   PPP bilaterally;  rt radial site cdi jojo

## 2021-09-06 NOTE — PROGRESS NOTE ADULT - PROBLEM SELECTOR PLAN 2
diabetic diet  endo cslt endo following  accuchecks ac and hs  diabetic diet  continue lantus and admelog as per endo

## 2021-09-06 NOTE — PROGRESS NOTE ADULT - ASSESSMENT
71   yr old male   admit from [facility]  after cardiac revealed   CAD   , patient with HgAic of 9  pt unaware he was a diabetic and no other significant history  9/5   vss  hep gtt endo cslt for DM2   p2y12  39 71   yr old male   admit from [facility]  after cardiac revealed CAD;  patient with HgAic of 9  pt unaware he was a diabetic and no other significant history  9/5   vss  hep gtt endo cslt for DM2   p2y12  37  9/6 VSS: continue preop workup; bs improved 108 today; echo mild Mr/ Mild RA; carotids: neg sig ICA stenosis  continue asa statin b-blockers and heparin gttp  repeat p2y12 today 108  ?OR when p2y12 improved/ increased  71   yr old male   admit from [facility]  after cardiac revealed CAD;  patient with HgAic of 9  pt unaware he was a diabetic and no other significant history  9/5   vss  hep gtt endo cslt for DM2   p2y12  37  9/6 VSS: continue preop workup; bs improved 108 today; echo mild Mr/ Mild RA; carotids: neg sig ICA stenosis  continue asa statin b-blockers and heparin gttp  repeat p2y12 today 108- repeat in am 9/7  ?OR when p2y12 improved/ increased

## 2021-09-06 NOTE — PROGRESS NOTE ADULT - PROBLEM SELECTOR PLAN 1
preop cabg  wkup  BB ator asa  p2y12 in am  OR this wk continue preop workup; bs improved 108 today; echo mild Mr/ Mild RA; carotids: neg sig ICA stenosis  continue asa statin b-blockers and heparin gttp  repeat p2y12 today 108  ?OR when p2y12 improved/ increased continue preop workup; bs improved 108 today; echo mild Mr/ Mild RA; carotids: neg sig ICA stenosis  continue asa statin b-blockers and heparin gttp  repeat p2y12 today 108- repeat p2y12 in am tue   ?OR when p2y12 improved/ increased

## 2021-09-07 DIAGNOSIS — I10 ESSENTIAL (PRIMARY) HYPERTENSION: ICD-10-CM

## 2021-09-07 DIAGNOSIS — E78.5 HYPERLIPIDEMIA, UNSPECIFIED: ICD-10-CM

## 2021-09-07 LAB
ANION GAP SERPL CALC-SCNC: 15 MMOL/L — SIGNIFICANT CHANGE UP (ref 5–17)
APTT BLD: 74 SEC — HIGH (ref 27.5–35.5)
APTT BLD: 97.8 SEC — HIGH (ref 27.5–35.5)
BUN SERPL-MCNC: 21 MG/DL — SIGNIFICANT CHANGE UP (ref 7–23)
CALCIUM SERPL-MCNC: 9 MG/DL — SIGNIFICANT CHANGE UP (ref 8.4–10.5)
CHLORIDE SERPL-SCNC: 102 MMOL/L — SIGNIFICANT CHANGE UP (ref 96–108)
CO2 SERPL-SCNC: 18 MMOL/L — LOW (ref 22–31)
CREAT SERPL-MCNC: 1.18 MG/DL — SIGNIFICANT CHANGE UP (ref 0.5–1.3)
GLUCOSE BLDC GLUCOMTR-MCNC: 109 MG/DL — HIGH (ref 70–99)
GLUCOSE BLDC GLUCOMTR-MCNC: 126 MG/DL — HIGH (ref 70–99)
GLUCOSE BLDC GLUCOMTR-MCNC: 84 MG/DL — SIGNIFICANT CHANGE UP (ref 70–99)
GLUCOSE BLDC GLUCOMTR-MCNC: 84 MG/DL — SIGNIFICANT CHANGE UP (ref 70–99)
GLUCOSE BLDC GLUCOMTR-MCNC: 95 MG/DL — SIGNIFICANT CHANGE UP (ref 70–99)
GLUCOSE BLDC GLUCOMTR-MCNC: 99 MG/DL — SIGNIFICANT CHANGE UP (ref 70–99)
GLUCOSE SERPL-MCNC: 95 MG/DL — SIGNIFICANT CHANGE UP (ref 70–99)
HCT VFR BLD CALC: 44.8 % — SIGNIFICANT CHANGE UP (ref 39–50)
HGB BLD-MCNC: 14.4 G/DL — SIGNIFICANT CHANGE UP (ref 13–17)
MCHC RBC-ENTMCNC: 24.8 PG — LOW (ref 27–34)
MCHC RBC-ENTMCNC: 32.1 GM/DL — SIGNIFICANT CHANGE UP (ref 32–36)
MCV RBC AUTO: 77.2 FL — LOW (ref 80–100)
NRBC # BLD: 0 /100 WBCS — SIGNIFICANT CHANGE UP (ref 0–0)
PA ADP PRP-ACNC: 169 PRU — LOW (ref 194–417)
PLATELET # BLD AUTO: 309 K/UL — SIGNIFICANT CHANGE UP (ref 150–400)
POTASSIUM SERPL-MCNC: 3.9 MMOL/L — SIGNIFICANT CHANGE UP (ref 3.5–5.3)
POTASSIUM SERPL-SCNC: 3.9 MMOL/L — SIGNIFICANT CHANGE UP (ref 3.5–5.3)
RBC # BLD: 5.8 M/UL — SIGNIFICANT CHANGE UP (ref 4.2–5.8)
RBC # FLD: 14.7 % — HIGH (ref 10.3–14.5)
SODIUM SERPL-SCNC: 135 MMOL/L — SIGNIFICANT CHANGE UP (ref 135–145)
WBC # BLD: 10.58 K/UL — HIGH (ref 3.8–10.5)
WBC # FLD AUTO: 10.58 K/UL — HIGH (ref 3.8–10.5)

## 2021-09-07 PROCEDURE — 99232 SBSQ HOSP IP/OBS MODERATE 35: CPT

## 2021-09-07 RX ORDER — HEPARIN SODIUM 5000 [USP'U]/ML
500 INJECTION INTRAVENOUS; SUBCUTANEOUS
Qty: 25000 | Refills: 0 | Status: DISCONTINUED | OUTPATIENT
Start: 2021-09-07 | End: 2021-09-08

## 2021-09-07 RX ORDER — POTASSIUM CHLORIDE 20 MEQ
20 PACKET (EA) ORAL ONCE
Refills: 0 | Status: COMPLETED | OUTPATIENT
Start: 2021-09-07 | End: 2021-09-07

## 2021-09-07 RX ADMIN — Medication 5 UNIT(S): at 12:20

## 2021-09-07 RX ADMIN — HEPARIN SODIUM 5 UNIT(S)/HR: 5000 INJECTION INTRAVENOUS; SUBCUTANEOUS at 12:21

## 2021-09-07 RX ADMIN — Medication 20 MILLIEQUIVALENT(S): at 09:56

## 2021-09-07 RX ADMIN — SODIUM CHLORIDE 3 MILLILITER(S): 9 INJECTION INTRAMUSCULAR; INTRAVENOUS; SUBCUTANEOUS at 22:11

## 2021-09-07 RX ADMIN — SODIUM CHLORIDE 3 MILLILITER(S): 9 INJECTION INTRAMUSCULAR; INTRAVENOUS; SUBCUTANEOUS at 06:00

## 2021-09-07 RX ADMIN — Medication 5 UNIT(S): at 07:54

## 2021-09-07 RX ADMIN — SODIUM CHLORIDE 3 MILLILITER(S): 9 INJECTION INTRAMUSCULAR; INTRAVENOUS; SUBCUTANEOUS at 14:03

## 2021-09-07 RX ADMIN — HEPARIN SODIUM 5 UNIT(S)/HR: 5000 INJECTION INTRAVENOUS; SUBCUTANEOUS at 16:00

## 2021-09-07 RX ADMIN — PANTOPRAZOLE SODIUM 40 MILLIGRAM(S): 20 TABLET, DELAYED RELEASE ORAL at 06:26

## 2021-09-07 RX ADMIN — INSULIN GLARGINE 15 UNIT(S): 100 INJECTION, SOLUTION SUBCUTANEOUS at 23:45

## 2021-09-07 RX ADMIN — Medication 25 MILLIGRAM(S): at 06:26

## 2021-09-07 RX ADMIN — Medication 5 UNIT(S): at 16:58

## 2021-09-07 RX ADMIN — ATORVASTATIN CALCIUM 80 MILLIGRAM(S): 80 TABLET, FILM COATED ORAL at 22:13

## 2021-09-07 RX ADMIN — Medication 25 MILLIGRAM(S): at 17:44

## 2021-09-07 RX ADMIN — Medication 81 MILLIGRAM(S): at 09:56

## 2021-09-07 NOTE — PROGRESS NOTE ADULT - SUBJECTIVE AND OBJECTIVE BOX
Chief Complaint:     History:    MEDICATIONS  (STANDING):  aspirin enteric coated 81 milliGRAM(s) Oral daily  atorvastatin 80 milliGRAM(s) Oral at bedtime  dextrose 40% Gel 15 Gram(s) Oral once  dextrose 50% Injectable 25 Gram(s) IV Push once  heparin  Infusion 500 Unit(s)/Hr (5 mL/Hr) IV Continuous <Continuous>  influenza   Vaccine 0.5 milliLiter(s) IntraMuscular once  insulin glargine Injectable (LANTUS) 15 Unit(s) SubCutaneous at bedtime  insulin lispro (ADMELOG) corrective regimen sliding scale   SubCutaneous three times a day before meals  insulin lispro (ADMELOG) corrective regimen sliding scale   SubCutaneous at bedtime  insulin lispro Injectable (ADMELOG) 5 Unit(s) SubCutaneous three times a day before meals  metoprolol tartrate 25 milliGRAM(s) Oral every 12 hours  pantoprazole    Tablet 40 milliGRAM(s) Oral before breakfast  sodium chloride 0.9% lock flush 3 milliLiter(s) IV Push every 8 hours    MEDICATIONS  (PRN):      Allergies    No Known Allergies    Intolerances      Review of Systems:  Constitutional: No fever  Eyes: No blurry vision  Neuro: No tremors  HEENT: No pain  Cardiovascular: No chest pain, palpitations  Respiratory: No SOB, no cough  GI: No nausea, vomiting, abdominal pain  : No dysuria  Skin: no rash  Psych: no depression  Endocrine: no polyuria, polydipsia  Hem/lymph: no swelling  Osteoporosis: no fractures    ALL OTHER SYSTEMS REVIEWED AND NEGATIVE    UNABLE TO OBTAIN    PHYSICAL EXAM:  VITALS: T(C): 36.7 (09-07-21 @ 12:22)  T(F): 98.1 (09-07-21 @ 12:22), Max: 98.1 (09-07-21 @ 04:43)  HR: 77 (09-07-21 @ 12:22) (69 - 77)  BP: 144/89 (09-07-21 @ 12:22) (128/78 - 160/96)  RR:  (18 - 18)  SpO2:  (97% - 99%)  Wt(kg): --  GENERAL: NAD, well-groomed, well-developed  EYES: No proptosis, no lid lag, anicteric  HEENT:  Atraumatic, Normocephalic, moist mucous membranes  THYROID: Normal size, no palpable nodules  RESPIRATORY: Clear to auscultation bilaterally; No rales, rhonchi, wheezing, or rubs  CARDIOVASCULAR: Regular rate and rhythm; No murmurs; no peripheral edema  GI: Soft, nontender, non distended, normal bowel sounds  SKIN: Dry, intact, No rashes or lesions  MUSCULOSKELETAL: Full range of motion, normal strength  NEURO: sensation intact, extraocular movements intact, no tremor, normal reflexes  PSYCH: Alert and oriented x 3, normal affect, normal mood  CUSHING'S SIGNS: no striae    POCT Blood Glucose.: 109 mg/dL (09-07-21 @ 12:07)  POCT Blood Glucose.: 95 mg/dL (09-07-21 @ 07:36)  POCT Blood Glucose.: 111 mg/dL (09-06-21 @ 21:33)  POCT Blood Glucose.: 96 mg/dL (09-06-21 @ 16:37)  POCT Blood Glucose.: 108 mg/dL (09-06-21 @ 11:31)  POCT Blood Glucose.: 96 mg/dL (09-06-21 @ 07:21)  POCT Blood Glucose.: 111 mg/dL (09-05-21 @ 21:41)  POCT Blood Glucose.: 106 mg/dL (09-05-21 @ 16:53)  POCT Blood Glucose.: 105 mg/dL (09-05-21 @ 11:26)  POCT Blood Glucose.: 113 mg/dL (09-05-21 @ 07:30)  POCT Blood Glucose.: 192 mg/dL (09-04-21 @ 23:34)  POCT Blood Glucose.: 111 mg/dL (09-04-21 @ 20:42)  POCT Blood Glucose.: 110 mg/dL (09-04-21 @ 16:40)      09-07    135  |  102  |  21  ----------------------------<  95  3.9   |  18<L>  |  1.18    EGFR if : 71  EGFR if non : 61    Ca    9.0      09-07  Mg     2.1     09-05  Phos  3.5     09-05    TPro  7.7  /  Alb  3.8  /  TBili  1.0  /  DBili  x   /  AST  34  /  ALT  36  /  AlkPhos  83  09-06          Thyroid Function Tests:  09-05 @ 10:39 TSH 6.00 FreeT4 -- T3 -- Anti TPO -- Anti Thyroglobulin Ab -- TSI --  09-03 @ 07:38 TSH 4.01 FreeT4 -- T3 -- Anti TPO -- Anti Thyroglobulin Ab -- TSI --                           Chief Complaint: T2DM    History: No acute events. No hypoglycemia.     MEDICATIONS  (STANDING):  aspirin enteric coated 81 milliGRAM(s) Oral daily  atorvastatin 80 milliGRAM(s) Oral at bedtime  dextrose 40% Gel 15 Gram(s) Oral once  dextrose 50% Injectable 25 Gram(s) IV Push once  heparin  Infusion 500 Unit(s)/Hr (5 mL/Hr) IV Continuous <Continuous>  influenza   Vaccine 0.5 milliLiter(s) IntraMuscular once  insulin glargine Injectable (LANTUS) 15 Unit(s) SubCutaneous at bedtime  insulin lispro (ADMELOG) corrective regimen sliding scale   SubCutaneous three times a day before meals  insulin lispro (ADMELOG) corrective regimen sliding scale   SubCutaneous at bedtime  insulin lispro Injectable (ADMELOG) 5 Unit(s) SubCutaneous three times a day before meals  metoprolol tartrate 25 milliGRAM(s) Oral every 12 hours  pantoprazole    Tablet 40 milliGRAM(s) Oral before breakfast  sodium chloride 0.9% lock flush 3 milliLiter(s) IV Push every 8 hours    MEDICATIONS  (PRN):      Allergies    No Known Allergies    Intolerances      Review of Systems:  Constitutional: No fever  Eyes: No blurry vision  Neuro: No tremors  HEENT: No pain  Cardiovascular: No chest pain, palpitations  Respiratory: No SOB, no cough  GI: No nausea, vomiting, abdominal pain  : No dysuria  Skin: no rash  Psych: no depression      ALL OTHER SYSTEMS REVIEWED AND NEGATIVE        PHYSICAL EXAM:  VITALS: T(C): 36.7 (09-07-21 @ 12:22)  T(F): 98.1 (09-07-21 @ 12:22), Max: 98.1 (09-07-21 @ 04:43)  HR: 77 (09-07-21 @ 12:22) (69 - 77)  BP: 144/89 (09-07-21 @ 12:22) (128/78 - 160/96)  RR:  (18 - 18)  SpO2:  (97% - 99%)  Wt(kg): --  GENERAL: NAD, well-groomed, well-developed  EYES: No proptosis, no lid lag, anicteric  HEENT:  Atraumatic, Normocephalic, moist mucous membranes  RESPIRATORY: Clear to auscultation bilaterally  CARDIOVASCULAR: Regular rate and rhythm  GI: Soft, nontender, non distended, normal bowel sounds  SKIN: Dry, intact, No rashes or lesions  PSYCH: Alert and oriented x 3, normal affect, normal mood      POCT Blood Glucose.: 109 mg/dL (09-07-21 @ 12:07)  POCT Blood Glucose.: 95 mg/dL (09-07-21 @ 07:36)  POCT Blood Glucose.: 111 mg/dL (09-06-21 @ 21:33)  POCT Blood Glucose.: 96 mg/dL (09-06-21 @ 16:37)  POCT Blood Glucose.: 108 mg/dL (09-06-21 @ 11:31)  POCT Blood Glucose.: 96 mg/dL (09-06-21 @ 07:21)  POCT Blood Glucose.: 111 mg/dL (09-05-21 @ 21:41)  POCT Blood Glucose.: 106 mg/dL (09-05-21 @ 16:53)  POCT Blood Glucose.: 105 mg/dL (09-05-21 @ 11:26)  POCT Blood Glucose.: 113 mg/dL (09-05-21 @ 07:30)  POCT Blood Glucose.: 192 mg/dL (09-04-21 @ 23:34)  POCT Blood Glucose.: 111 mg/dL (09-04-21 @ 20:42)  POCT Blood Glucose.: 110 mg/dL (09-04-21 @ 16:40)      09-07    135  |  102  |  21  ----------------------------<  95  3.9   |  18<L>  |  1.18    EGFR if : 71  EGFR if non : 61    Ca    9.0      09-07  Mg     2.1     09-05  Phos  3.5     09-05    TPro  7.7  /  Alb  3.8  /  TBili  1.0  /  DBili  x   /  AST  34  /  ALT  36  /  AlkPhos  83  09-06          Thyroid Function Tests:  09-05 @ 10:39 TSH 6.00 FreeT4 -- T3 -- Anti TPO -- Anti Thyroglobulin Ab -- TSI --  09-03 @ 07:38 TSH 4.01 FreeT4 -- T3 -- Anti TPO -- Anti Thyroglobulin Ab -- TSI --

## 2021-09-07 NOTE — PROGRESS NOTE ADULT - ASSESSMENT
71 year old man with no reported PMH (hasn't seen PCP in 6 yrs) presents with NSTEMI, found to have multi-vessel disease on cath and now awaiting transfer to [facility] for CT surgery evaluation. Patient found to have A1c of 9.2% new onset DM2. High risk patient with high level decision making, presenting with NSTEMI and at risk of further microvascular and macrovascular complications. BG goal 100-180 mg/dL.

## 2021-09-07 NOTE — PROGRESS NOTE ADULT - SUBJECTIVE AND OBJECTIVE BOX
Patient discussed on morning rounds with       Operation / Date:     SUBJECTIVE ASSESSMENT:  71y Male         Vital Signs Last 24 Hrs  T(C): 36.7 (07 Sep 2021 04:43), Max: 36.7 (07 Sep 2021 04:43)  T(F): 98.1 (07 Sep 2021 04:43), Max: 98.1 (07 Sep 2021 04:43)  HR: 76 (07 Sep 2021 04:43) (69 - 79)  BP: 128/78 (07 Sep 2021 04:43) (128/78 - 160/96)  BP(mean): --  RR: 18 (07 Sep 2021 04:43) (18 - 18)  SpO2: 97% (07 Sep 2021 04:43) (97% - 99%)  I&O's Detail    05 Sep 2021 07:01  -  06 Sep 2021 07:00  --------------------------------------------------------  IN:    Heparin: 180 mL    Oral Fluid: 1040 mL  Total IN: 1220 mL    OUT:    Voided (mL): 1700 mL  Total OUT: 1700 mL    Total NET: -480 mL      06 Sep 2021 07:01  -  07 Sep 2021 06:34  --------------------------------------------------------  IN:    Heparin: 154.5 mL    Oral Fluid: 920 mL  Total IN: 1074.5 mL    OUT:    Voided (mL): 1050 mL  Total OUT: 1050 mL    Total NET: 24.5 mL          CHEST TUBE:  Yes/No. AIR LEAKS: Yes/No. Suction / H2O SEAL.   LUZ MARIA DRAIN:  Yes/No.  EPICARDIAL WIRES: Yes/No.  TIE DOWNS: Yes/No.  BERTRAND: Yes/No.    PHYSICAL EXAM:    General:     Neurological:    Cardiovascular:    Respiratory:    Gastrointestinal:    Extremities:    Vascular:    Incision Sites:    LABS:                        14.4   10.58 )-----------( 309      ( 07 Sep 2021 06:01 )             44.8       COUMADIN:  Yes/No. REASON: .    PTT - ( 07 Sep 2021 06:01 )  PTT:97.8 sec    09-07    135  |  102  |  21  ----------------------------<  95  3.9   |  18<L>  |  1.18    Ca    9.0      07 Sep 2021 06:01  Phos  3.5     09-05  Mg     2.1     09-05    TPro  7.7  /  Alb  3.8  /  TBili  1.0  /  DBili  x   /  AST  34  /  ALT  36  /  AlkPhos  83  09-06          MEDICATIONS  (STANDING):  aspirin enteric coated 81 milliGRAM(s) Oral daily  atorvastatin 80 milliGRAM(s) Oral at bedtime  dextrose 40% Gel 15 Gram(s) Oral once  dextrose 50% Injectable 25 Gram(s) IV Push once  heparin  Infusion 750 Unit(s)/Hr (7 mL/Hr) IV Continuous <Continuous>  influenza   Vaccine 0.5 milliLiter(s) IntraMuscular once  insulin glargine Injectable (LANTUS) 15 Unit(s) SubCutaneous at bedtime  insulin lispro (ADMELOG) corrective regimen sliding scale   SubCutaneous three times a day before meals  insulin lispro (ADMELOG) corrective regimen sliding scale   SubCutaneous at bedtime  insulin lispro Injectable (ADMELOG) 5 Unit(s) SubCutaneous three times a day before meals  metoprolol tartrate 25 milliGRAM(s) Oral every 12 hours  pantoprazole    Tablet 40 milliGRAM(s) Oral before breakfast  sodium chloride 0.9% lock flush 3 milliLiter(s) IV Push every 8 hours    MEDICATIONS  (PRN):        RADIOLOGY & ADDITIONAL TESTS:     Patient discussed on morning rounds with Dr. Barajas    Operation / Date:  Pre op CAD    SUBJECTIVE ASSESSMENT:  Patient feels well; In good shape, walks well on his own, good appetite.  Denies any CP, SOB, palpitations, N/V/D, fever or chills.        Vital Signs Last 24 Hrs  T(C): 36.7 (07 Sep 2021 04:43), Max: 36.7 (07 Sep 2021 04:43)  T(F): 98.1 (07 Sep 2021 04:43), Max: 98.1 (07 Sep 2021 04:43)  HR: 76 (07 Sep 2021 04:43) (69 - 79)  BP: 128/78 (07 Sep 2021 04:43) (128/78 - 160/96)  BP(mean): --  RR: 18 (07 Sep 2021 04:43) (18 - 18)  SpO2: 97% (07 Sep 2021 04:43) (97% - 99%)  I&O's Detail    05 Sep 2021 07:01  -  06 Sep 2021 07:00  --------------------------------------------------------  IN:    Heparin: 180 mL    Oral Fluid: 1040 mL  Total IN: 1220 mL    OUT:    Voided (mL): 1700 mL  Total OUT: 1700 mL    Total NET: -480 mL      06 Sep 2021 07:01  -  07 Sep 2021 06:34  --------------------------------------------------------  IN:    Heparin: 154.5 mL    Oral Fluid: 920 mL  Total IN: 1074.5 mL    OUT:    Voided (mL): 1050 mL  Total OUT: 1050 mL    Total NET: 24.5 mL      PHYSICAL EXAM:    GEN: NAD, looks comfortable; On room air.   Psych: Mood appropriate  Neuro: A&Ox3.  No focal deficits.  Moving all extremities.   HEENT: No obvious abnormalities  CV: S1S2, regular, no murmurs appreciated.  No carotid bruits.  No JVD  Lungs: Clear B/L.  No wheezing, rales or rhonchi  ABD: Soft, non-tender, non-distended.  +Bowel sounds  EXT: Warm and well perfused.  No peripheral edema noted  Musculoskeletal: Moving all extremities with normal ROM, no joint swelling  PV: Pedal pulses palpable      LABS:                        14.4   10.58 )-----------( 309      ( 07 Sep 2021 06:01 )             44.8         PTT - ( 07 Sep 2021 06:01 )  PTT:97.8 sec    09-07    135  |  102  |  21  ----------------------------<  95  3.9   |  18<L>  |  1.18    Ca    9.0      07 Sep 2021 06:01  Phos  3.5     09-05  Mg     2.1     09-05    TPro  7.7  /  Alb  3.8  /  TBili  1.0  /  DBili  x   /  AST  34  /  ALT  36  /  AlkPhos  83  09-06          MEDICATIONS  (STANDING):  aspirin enteric coated 81 milliGRAM(s) Oral daily  atorvastatin 80 milliGRAM(s) Oral at bedtime  dextrose 40% Gel 15 Gram(s) Oral once  dextrose 50% Injectable 25 Gram(s) IV Push once  heparin  Infusion 750 Unit(s)/Hr (7 mL/Hr) IV Continuous <Continuous>  influenza   Vaccine 0.5 milliLiter(s) IntraMuscular once  insulin glargine Injectable (LANTUS) 15 Unit(s) SubCutaneous at bedtime  insulin lispro (ADMELOG) corrective regimen sliding scale   SubCutaneous three times a day before meals  insulin lispro (ADMELOG) corrective regimen sliding scale   SubCutaneous at bedtime  insulin lispro Injectable (ADMELOG) 5 Unit(s) SubCutaneous three times a day before meals  metoprolol tartrate 25 milliGRAM(s) Oral every 12 hours  pantoprazole    Tablet 40 milliGRAM(s) Oral before breakfast  sodium chloride 0.9% lock flush 3 milliLiter(s) IV Push every 8 hours    MEDICATIONS  (PRN):        RADIOLOGY & ADDITIONAL TESTS:    < from: Xray Chest 2 Views PA/Lat (09.02.21 @ 10:29) >    There is no acute bone pathology.    < end of copied text >    < from: VA Duplex Carotid Arteries, Bilateral. (09.03.21 @ 09:51) >  Summary/Impressions:  Right Internal Carotid Artery:  There is minimal  heterogenous plaque within the proximal vessel, consistent  with a 1-15% stenosis.  No hemodynamically significant  stenosis.  Left Internal Carotid Artery:  There is mild heterogenous  plaque within the proximal vessel, consistent with a  16-49% stenosis. No hemodynamically significant stenosis.  ------------------------------------------------------------------------  Confirmed on  9/3/2021 - 12:54 PM by Juan Daniel Jessica MD, [facility],  [facility], [facility]  By signing this report, the attending physician certifies  that he or she has personally supervised and interpreted  the vascular study and has reviewed and or edited and  agrees with the written comments contained within the  report.    < end of copied text >

## 2021-09-07 NOTE — PROGRESS NOTE ADULT - ASSESSMENT
71 yr old male admited from [facility]  after cardiac revealed CAD;  patient with HgA1C of 9%, pt unaware he was a diabetic and no other significant history.  Originally from [place].  Lived in this country x over 30 years.  Still active, works full-time.       9/5   vss  hep gtt endo cslt for DM2   p2y12  39  9/6 VSS: continue preop workup; bs improved 108 today; echo mild Mr/ Mild RA; carotids: neg sig ICA stenosis  continue asa statin b-blockers and heparin gttp  repeat p2y12 today 108- repeat in am 9/7 9/7 P2Y12 169.  Ptt 97, dec'd gtt from 7 units to 5  units.      OR likely Friday.  If possible depending on schedule, will go Thursday     Problem/Plan - 1:  ·  Problem: Coronary disease.   ·  Plan: continue preop workup; BS improved 95 today; echo mild Mr/ Mild RA; carotids: neg sig ICA stenosis  continue asa statin b-blockers and heparin gttp- check 4pm PTT  P2Y12 169.  Ptt 97, dec'd gtt from 7 units to 5  units.      OR Thursday/Friday.     Problem/Plan - 2:  ·  Problem: Diabetes mellitus.   ·  Plan: endo following  accuchecks ac and hs  diabetic diet  continue lantus and admelog as per endo.  FS currently under control    Attestation Statements:    Attestation Statements:  ACP only visit.     Supervising Attending: Dr. Barajas.

## 2021-09-08 ENCOUNTER — TRANSCRIPTION ENCOUNTER (OUTPATIENT)
Age: 71
End: 2021-09-08

## 2021-09-08 LAB
ANION GAP SERPL CALC-SCNC: 13 MMOL/L — SIGNIFICANT CHANGE UP (ref 5–17)
APTT BLD: 53.8 SEC — HIGH (ref 27.5–35.5)
APTT BLD: 62.6 SEC — HIGH (ref 27.5–35.5)
APTT BLD: 67.9 SEC — HIGH (ref 27.5–35.5)
APTT BLD: 76 SEC — HIGH (ref 27.5–35.5)
BUN SERPL-MCNC: 17 MG/DL — SIGNIFICANT CHANGE UP (ref 7–23)
CALCIUM SERPL-MCNC: 9.4 MG/DL — SIGNIFICANT CHANGE UP (ref 8.4–10.5)
CHLORIDE SERPL-SCNC: 102 MMOL/L — SIGNIFICANT CHANGE UP (ref 96–108)
CO2 SERPL-SCNC: 18 MMOL/L — LOW (ref 22–31)
CREAT SERPL-MCNC: 1.09 MG/DL — SIGNIFICANT CHANGE UP (ref 0.5–1.3)
GLUCOSE BLDC GLUCOMTR-MCNC: 141 MG/DL — HIGH (ref 70–99)
GLUCOSE BLDC GLUCOMTR-MCNC: 173 MG/DL — HIGH (ref 70–99)
GLUCOSE BLDC GLUCOMTR-MCNC: 90 MG/DL — SIGNIFICANT CHANGE UP (ref 70–99)
GLUCOSE BLDC GLUCOMTR-MCNC: 98 MG/DL — SIGNIFICANT CHANGE UP (ref 70–99)
GLUCOSE SERPL-MCNC: 96 MG/DL — SIGNIFICANT CHANGE UP (ref 70–99)
HCT VFR BLD CALC: 43.2 % — SIGNIFICANT CHANGE UP (ref 39–50)
HGB BLD-MCNC: 14.2 G/DL — SIGNIFICANT CHANGE UP (ref 13–17)
MAGNESIUM SERPL-MCNC: 2.2 MG/DL — SIGNIFICANT CHANGE UP (ref 1.6–2.6)
MCHC RBC-ENTMCNC: 25.2 PG — LOW (ref 27–34)
MCHC RBC-ENTMCNC: 32.9 GM/DL — SIGNIFICANT CHANGE UP (ref 32–36)
MCV RBC AUTO: 76.6 FL — LOW (ref 80–100)
NRBC # BLD: 0 /100 WBCS — SIGNIFICANT CHANGE UP (ref 0–0)
PLATELET # BLD AUTO: 301 K/UL — SIGNIFICANT CHANGE UP (ref 150–400)
POTASSIUM SERPL-MCNC: 4.2 MMOL/L — SIGNIFICANT CHANGE UP (ref 3.5–5.3)
POTASSIUM SERPL-SCNC: 4.2 MMOL/L — SIGNIFICANT CHANGE UP (ref 3.5–5.3)
RBC # BLD: 5.64 M/UL — SIGNIFICANT CHANGE UP (ref 4.2–5.8)
RBC # FLD: 14.7 % — HIGH (ref 10.3–14.5)
SARS-COV-2 RNA SPEC QL NAA+PROBE: SIGNIFICANT CHANGE UP
SODIUM SERPL-SCNC: 133 MMOL/L — LOW (ref 135–145)
WBC # BLD: 9.71 K/UL — SIGNIFICANT CHANGE UP (ref 3.8–10.5)
WBC # FLD AUTO: 9.71 K/UL — SIGNIFICANT CHANGE UP (ref 3.8–10.5)

## 2021-09-08 PROCEDURE — 93880 EXTRACRANIAL BILAT STUDY: CPT | Mod: 26

## 2021-09-08 PROCEDURE — 71045 X-RAY EXAM CHEST 1 VIEW: CPT | Mod: 26

## 2021-09-08 PROCEDURE — 99232 SBSQ HOSP IP/OBS MODERATE 35: CPT

## 2021-09-08 RX ORDER — HEPARIN SODIUM 5000 [USP'U]/ML
600 INJECTION INTRAVENOUS; SUBCUTANEOUS
Qty: 25000 | Refills: 0 | Status: DISCONTINUED | OUTPATIENT
Start: 2021-09-08 | End: 2021-09-09

## 2021-09-08 RX ORDER — CEFUROXIME AXETIL 250 MG
1500 TABLET ORAL ONCE
Refills: 0 | Status: DISCONTINUED | OUTPATIENT
Start: 2021-09-08 | End: 2021-09-09

## 2021-09-08 RX ORDER — CHLORHEXIDINE GLUCONATE 213 G/1000ML
1 SOLUTION TOPICAL ONCE
Refills: 0 | Status: COMPLETED | OUTPATIENT
Start: 2021-09-08 | End: 2021-09-08

## 2021-09-08 RX ORDER — CHLORHEXIDINE GLUCONATE 213 G/1000ML
30 SOLUTION TOPICAL ONCE
Refills: 0 | Status: DISCONTINUED | OUTPATIENT
Start: 2021-09-08 | End: 2021-09-09

## 2021-09-08 RX ADMIN — HEPARIN SODIUM 6 UNIT(S)/HR: 5000 INJECTION INTRAVENOUS; SUBCUTANEOUS at 12:09

## 2021-09-08 RX ADMIN — SODIUM CHLORIDE 3 MILLILITER(S): 9 INJECTION INTRAMUSCULAR; INTRAVENOUS; SUBCUTANEOUS at 13:05

## 2021-09-08 RX ADMIN — CHLORHEXIDINE GLUCONATE 1 APPLICATION(S): 213 SOLUTION TOPICAL at 20:30

## 2021-09-08 RX ADMIN — ATORVASTATIN CALCIUM 80 MILLIGRAM(S): 80 TABLET, FILM COATED ORAL at 21:46

## 2021-09-08 RX ADMIN — PANTOPRAZOLE SODIUM 40 MILLIGRAM(S): 20 TABLET, DELAYED RELEASE ORAL at 05:41

## 2021-09-08 RX ADMIN — SODIUM CHLORIDE 3 MILLILITER(S): 9 INJECTION INTRAMUSCULAR; INTRAVENOUS; SUBCUTANEOUS at 05:40

## 2021-09-08 RX ADMIN — Medication 5 UNIT(S): at 16:46

## 2021-09-08 RX ADMIN — Medication 5 UNIT(S): at 08:11

## 2021-09-08 RX ADMIN — Medication 25 MILLIGRAM(S): at 17:38

## 2021-09-08 RX ADMIN — Medication 81 MILLIGRAM(S): at 11:01

## 2021-09-08 RX ADMIN — SODIUM CHLORIDE 3 MILLILITER(S): 9 INJECTION INTRAMUSCULAR; INTRAVENOUS; SUBCUTANEOUS at 21:02

## 2021-09-08 RX ADMIN — Medication 5 UNIT(S): at 11:59

## 2021-09-08 RX ADMIN — Medication 1: at 11:59

## 2021-09-08 RX ADMIN — HEPARIN SODIUM 6 UNIT(S)/HR: 5000 INJECTION INTRAVENOUS; SUBCUTANEOUS at 16:46

## 2021-09-08 RX ADMIN — Medication 25 MILLIGRAM(S): at 05:41

## 2021-09-08 RX ADMIN — HEPARIN SODIUM 5 UNIT(S)/HR: 5000 INJECTION INTRAVENOUS; SUBCUTANEOUS at 00:04

## 2021-09-08 NOTE — PROGRESS NOTE ADULT - ASSESSMENT
This is a  71 yr old male admitted from [facility]  after cardiac revealed CAD;  patient with HgA1C of 9%, pt unaware he was a diabetic and no other significant history.  Originally from [place].  Lived in this country x over 30 years.  Still active, works full-time.       9/5   vss  hep gtt endo cslt for DM2   p2y12  39  9/6 VSS: continue preop workup; bs improved 108 today; echo mild Mr/ Mild RA; carotids: neg sig ICA stenosis  continue asa statin b-blockers and heparin gttp  repeat p2y12 today 108- repeat in am 9/7 9/7 P2Y12 169.  Ptt 97, dec'd gtt from 7 units to 5  units  9/8 VSS  CP free for CABG in am.

## 2021-09-08 NOTE — PROGRESS NOTE ADULT - PROBLEM SELECTOR PLAN 1
NPO at midnight  Heparin GTT  peridex rinse and spit in am  Hibiclens shower tonight and am  Duran on call to OR  CABG in am with Dr Barajas

## 2021-09-08 NOTE — PROGRESS NOTE ADULT - SUBJECTIVE AND OBJECTIVE BOX
Cardiac Surgery Pre-op Note:    CC: Patient is a 71y old  Male who presents with a chief complaint of CAD now for CABG in am with Dr Barajas                                                                                                             Surgeon:   Dr Barajas  Procedure: 9/9/21 CABG    Allergies    No Known Allergies    Intolerances        HPI:  70 y/o male w/ no reported pmhx presents w/ 2 episodes of unprovoked chest pain since this morning found to have elevated cardiac enzymes with abnormal EKG, admitted to [facility] for NSTEMI. Had cardiac cath that showed 3v disease and is now planned for CABG eval at [facility].    (04 Sep 2021 21:07)      PAST MEDICAL & SURGICAL HISTORY:  No pertinent past medical history    History of cataract surgery        MEDICATIONS  (STANDING):  aspirin enteric coated 81 milliGRAM(s) Oral daily  atorvastatin 80 milliGRAM(s) Oral at bedtime  cefuroxime  IVPB 1500 milliGRAM(s) IV Intermittent once  chlorhexidine 0.12% Liquid 30 milliLiter(s) Swish and Spit once  chlorhexidine 4% Liquid 1 Application(s) Topical once  dextrose 40% Gel 15 Gram(s) Oral once  dextrose 50% Injectable 25 Gram(s) IV Push once  heparin  Infusion 600 Unit(s)/Hr (6 mL/Hr) IV Continuous <Continuous>  influenza   Vaccine 0.5 milliLiter(s) IntraMuscular once  insulin glargine Injectable (LANTUS) 15 Unit(s) SubCutaneous at bedtime  insulin lispro (ADMELOG) corrective regimen sliding scale   SubCutaneous three times a day before meals  insulin lispro (ADMELOG) corrective regimen sliding scale   SubCutaneous at bedtime  insulin lispro Injectable (ADMELOG) 5 Unit(s) SubCutaneous three times a day before meals  metoprolol tartrate 25 milliGRAM(s) Oral every 12 hours  pantoprazole    Tablet 40 milliGRAM(s) Oral before breakfast  sodium chloride 0.9% lock flush 3 milliLiter(s) IV Push every 8 hours    MEDICATIONS  (PRN):        Labs:                        14.2   9.71  )-----------( 301      ( 08 Sep 2021 07:03 )             43.2     09-08    133<L>  |  102  |  17  ----------------------------<  96  4.2   |  18<L>  |  1.09    Ca    9.4      08 Sep 2021 07:02  Mg     2.2     09-08      PTT - ( 08 Sep 2021 07:03 )  PTT:53.8 sec    Blood Type: ABO Interpretation: O (09-08 @ 07:07)    HGB A1C:   Prealbumin:   Pro-BNP: Serum Pro-Brain Natriuretic Peptide: 228 pg/mL (09-05 @ 06:58)    Thyroid Panel: 09-05 @ 10:39/6.00  --/--/--  09-03 @ 07:38/4.01  --/--/--    MRSA: MRSA PCR Result.: Norbert (09-05 @ 08:44)   / MSSA:       CXR:     EKG:    Carotid Duplex:      PFT's:    Echocardiogram:    Cardiac catheterization:    Vein Mapping:    Gen: WN/WD NAD  Neuro: AAOx3, nonfocal  Pulm: CTA B/L  CV: RRR, S1S2  Abd: Soft, NT, ND +BS  Ext: No edema, + peripheral pulses      Pt has AICD/PPM [ ] Yes  [ ] No             Brand Name:  Pre-op Beta Blocker ordered within 24 hrs of surgery (CABG ONLY)?  [ ] Yes  [ ] No  If not, Why?  Type & Cross  [ ] Yes  [ ] No  NPO after Midnight [ ] Yes  [ ] No  Pre-op ABX ordered, to be taped on chart:  [ ] Yes  [ ] No     Hibiclens/Peridex ordered [ ] Yes  [ ] No  Intraop on Hold: PRBCs, CXR, EULALIA [ ]   Consent obtained  [ ] Yes  [ ] No   Cardiac Surgery Pre-op Note:    CC: Patient is a 71y old  Male who presents with a chief complaint of CAD now for CABG in am with Dr Barajas     Surgeon:   Dr Barajas  Procedure: 9/9/21 CABG    Allergies  No Known Allergies  Intolerances    HPI:  70 y/o male w/ no reported pmhx presents w/ 2 episodes of unprovoked chest pain since this morning found to have elevated cardiac enzymes with abnormal EKG, admitted to [facility] for NSTEMI. Had cardiac cath that showed 3v disease and is now planned for CABG eval at [facility].    (04 Sep 2021 21:07)      PAST MEDICAL & SURGICAL HISTORY:  No pertinent past medical history    History of cataract surgery        MEDICATIONS  (STANDING):  aspirin enteric coated 81 milliGRAM(s) Oral daily  atorvastatin 80 milliGRAM(s) Oral at bedtime  cefuroxime  IVPB 1500 milliGRAM(s) IV Intermittent once  chlorhexidine 0.12% Liquid 30 milliLiter(s) Swish and Spit once  chlorhexidine 4% Liquid 1 Application(s) Topical once  heparin  Infusion 600 Unit(s)/Hr (6 mL/Hr) IV Continuous <Continuous>  influenza   Vaccine 0.5 milliLiter(s) IntraMuscular once  insulin glargine Injectable (LANTUS) 15 Unit(s) SubCutaneous at bedtime  insulin lispro (ADMELOG) corrective regimen sliding scale   SubCutaneous three times a day before meals  insulin lispro (ADMELOG) corrective regimen sliding scale   SubCutaneous at bedtime  insulin lispro Injectable (ADMELOG) 5 Unit(s) SubCutaneous three times a day before meals  metoprolol tartrate 25 milliGRAM(s) Oral every 12 hours  pantoprazole    Tablet 40 milliGRAM(s) Oral before breakfast  sodium chloride 0.9% lock flush 3 milliLiter(s) IV Push every 8 hours    MEDICATIONS  (PRN):        Labs:                        14.2   9.71  )-----------( 301      ( 08 Sep 2021 07:03 )             43.2     09-08    133<L>  |  102  |  17  ----------------------------<  96  4.2   |  18<L>  |  1.09    Ca    9.4      08 Sep 2021 07:02  Mg     2.2     09-08      PTT - ( 08 Sep 2021 07:03 )  PTT:53.8 sec    Blood Type: ABO Interpretation: O (09-08 @ 07:07)    HGB A1C: 8.9  Prealbumin:   Pro-BNP: Serum Pro-Brain Natriuretic Peptide: 228 pg/mL (09-05 @ 06:58)    Thyroid Panel: 09-05 @ 10:39/6.00  --/--/--  09-03 @ 07:38/4.01  --/--/--    MRSA: MRSA PCR Result.: NotDetec (09-05 @ 08:44)   / MSSA:     COVID-19 PCR: NotDetec (08 Sep 2021 07:34)  COVID-19 PCR: NotDetec (02 Sep 2021 10:13)      CXR:     EKG:eg< from: 12 Lead ECG (09.05.21 @ 09:09) >   NORMAL SINUS RHYTHM  ST & T WAVE ABNORMALITY, CONSIDER LATERAL ISCHEMIA  ABNORMAL ECG    < end of copied text >      Carotid Duplex:  < from: VA Duplex Carotid Arteries, Bilateral. (09.03.21 @ 09:51) >  Right Internal Carotid Artery:  There is minimal  heterogenous plaque within the proximal vessel, consistent  with a 1-15% stenosis.  No hemodynamically significant  stenosis.  Left Internal Carotid Artery:  There is mild heterogenous  plaque within the proximal vessel, consistent with a  16-49% stenosis. No hemodynamically significant stenosis.    < end of copied text >      PFT's:    Echocardiogram:< from: Transthoracic Echocardiogram (09.04.21 @ 08:27) >  1. Calcified trileaflet aortic valve with normal opening.  Mild aortic regurgitation.  2. Normal left ventricular internal dimensions and wall  thicknesses.  3. Normal left ventricular systolic function. No segmental  wall motion abnormalities.  4. Normal right ventricular size and function.  5. Estimated pulmonary artery systolic pressure equals 33  mm Hg, assuming right atrial pressure equals 10  mm Hg,  consistent with normal pulmonary pressures.    < end of copied text >      Cardiac catheterization:car< from: Cardiac Catherization (09.03.21 @ 08:56) >  VENTRICLES: EF estimated was 55 %.  CORONARY VESSELS: The coronary circulation is right dominant.  LM:   --  Mid left main: There was a discrete 40 % stenosis at a site with  no prior intervention. There was VALORIE grade 3 flow through the vessel  (brisk flow).  LAD:   --  Proximal LAD: There was a tubular 80 % stenosis at a site with  no prior intervention. There was VALORIE grade 3 flow through the vessel  (brisk flow).  CX:   --  OM1: There was a diffuse 95 % stenosis at a site withno prior  intervention. There was VALORIE grade 3 flow through the vessel (brisk flow).  RCA:   --  Mid RCA: There was a discrete 95 % stenosis at a site with no  prior intervention. There was VALORIE grade 3 flow through the vessel (brisk  flow).  COMPLICATIONS: There were no complications.  DIAGNOSTIC IMPRESSIONS: Severe Multivessel CAD  Recommend CTS for CABG  INTERVENTIONAL IMPRESSIONS: Severe Multivessel CAD  Recommend CTS for CABG    < end of copied text >      Vein Mapping:    Gen: WN/WD NAD  Neuro: AAOx3, nonfocal  Pulm: CTA B/L  CV: RRR, S1S2  Abd: Soft, NT, ND +BS  Ext: No edema, + peripheral pulses      Pt has AICD/PPM [ ] Yes  [x ] No             Brand Name:  Pre-op Beta Blocker ordered within 24 hrs of surgery (CABG ONLY)?  [ x] Yes  [ ] No  If not, Why?  Type & Cross  [x ] Yes  [ ] No  NPO after Midnight [ x] Yes  [ ] No  Pre-op ABX ordered, to be taped on chart:  [x] Yes  [ ] No     Hibiclens/Peridex ordered [x ] Yes  [ ] No  Intraop on Hold: PRBCs, CXR, EULALIA x ]   Consent obtained  [x ] Yes  [ ] No   Cardiac Surgery Pre-op Note:    CC: Patient is a 71y old  Male who presents with a chief complaint of CAD now for CABG in am with Dr Barajas     Surgeon:   Dr Barajas  Procedure: 21 CABG    Allergies  No Known Allergies  Intolerances    HPI:  70 y/o male w/ no reported pmhx presents w/ 2 episodes of unprovoked chest pain since this morning found to have elevated cardiac enzymes with abnormal EKG, admitted to [facility] for NSTEMI. Had cardiac cath that showed 3v disease and is now planned for CABG eval at [facility].    (04 Sep 2021 21:07)      PAST MEDICAL & SURGICAL HISTORY:  No pertinent past medical history    History of cataract surgery        MEDICATIONS  (STANDING):  aspirin enteric coated 81 milliGRAM(s) Oral daily  atorvastatin 80 milliGRAM(s) Oral at bedtime  cefuroxime  IVPB 1500 milliGRAM(s) IV Intermittent once  chlorhexidine 0.12% Liquid 30 milliLiter(s) Swish and Spit once  chlorhexidine 4% Liquid 1 Application(s) Topical once  heparin  Infusion 600 Unit(s)/Hr (6 mL/Hr) IV Continuous <Continuous>  influenza   Vaccine 0.5 milliLiter(s) IntraMuscular once  insulin glargine Injectable (LANTUS) 15 Unit(s) SubCutaneous at bedtime  insulin lispro (ADMELOG) corrective regimen sliding scale   SubCutaneous three times a day before meals  insulin lispro (ADMELOG) corrective regimen sliding scale   SubCutaneous at bedtime  insulin lispro Injectable (ADMELOG) 5 Unit(s) SubCutaneous three times a day before meals  metoprolol tartrate 25 milliGRAM(s) Oral every 12 hours  pantoprazole    Tablet 40 milliGRAM(s) Oral before breakfast  sodium chloride 0.9% lock flush 3 milliLiter(s) IV Push every 8 hours    MEDICATIONS  (PRN):  T(C): 36.6 (21 @ 12:49), Max: 37 (21 @ 05:36)  T(F): 97.8 (21 @ 12:49), Max: 98.6 (21 @ 05:36)  HR: 92 (21 @ 12:49) (69 - 92)  BP: 142/91 (21 @ 12:49) (132/81 - 144/80)  RR: 18 (21 @ 12:49) (18 - 18)  SpO2: 98% (21 @ 12:49) (97% - 98%  Daily Weight in k.6 (08 Sep 2021 08:24)  Daily height 167.7 cm      Labs:                        14.2   9.71  )-----------( 301      ( 08 Sep 2021 07:03 )             43.2         133<L>  |  102  |  17  ----------------------------<  96  4.2   |  18<L>  |  1.09    Ca    9.4      08 Sep 2021 07:02  Mg     2.2           PTT - ( 08 Sep 2021 07:03 )  PTT:53.8 sec    Blood Type: ABO Interpretation: O ( @ 07:07)    HGB A1C: 8.9:   Pro-BNP: Serum Pro-Brain Natriuretic Peptide: 228 pg/mL ( @ 06:58)    Thyroid Panel:  @ 10:39/6.00  --/--/--   @ 07:38/4.01  --/--/--    MRSA: MRSA PCR Result.: NotDetec ( @ 08:44)   / MSSA:     COVID-19 PCR: NotDetec (08 Sep 2021 07:34)  COVID-19 PCR: NotDetec (02 Sep 2021 10:13)      CXR: < from: Xray Chest 2 Views PA/Lat (21 @ 10:29) >    IMPRESSION:No acute cardiopulmonary pathology.        < end of copied text >      EKG:eg< from: 12 Lead ECG (21 @ 09:09) >   NORMAL SINUS RHYTHM  ST & T WAVE ABNORMALITY, CONSIDER LATERAL ISCHEMIA  ABNORMAL ECG    < end of copied text >      Carotid Duplex:  < from: VA Duplex Carotid Arteries, Bilateral. (21 @ 09:51) >  Right Internal Carotid Artery:  There is minimal  heterogenous plaque within the proximal vessel, consistent  with a 1-15% stenosis.  No hemodynamically significant  stenosis.  Left Internal Carotid Artery:  There is mild heterogenous  plaque within the proximal vessel, consistent with a  16-49% stenosis. No hemodynamically significant stenosis.    < end of copied text >      PFT's:    Echocardiogram:< from: Transthoracic Echocardiogram (21 @ 08:27) >  1. Calcified trileaflet aortic valve with normal opening.  Mild aortic regurgitation.  2. Normal left ventricular internal dimensions and wall  thicknesses.  3. Normal left ventricular systolic function. No segmental  wall motion abnormalities.  4. Normal right ventricular size and function.  5. Estimated pulmonary artery systolic pressure equals 33  mm Hg, assuming right atrial pressure equals 10  mm Hg,  consistent with normal pulmonary pressures.    < end of copied text >      Cardiac catheterization:car< from: Cardiac Catherization (21 @ 08:56) >  VENTRICLES: EF estimated was 55 %.  CORONARY VESSELS: The coronary circulation is right dominant.  LM:   --  Mid left main: There was a discrete 40 % stenosis at a site with  no prior intervention. There was VALORIE grade 3 flow through the vessel  (brisk flow).  LAD:   --  Proximal LAD: There was a tubular 80 % stenosis at a site with  no prior intervention. There was VALORIE grade 3 flow through the vessel  (brisk flow).  CX:   --  OM1: There was a diffuse 95 % stenosis at a site withno prior  intervention. There was VALORIE grade 3 flow through the vessel (brisk flow).  RCA:   --  Mid RCA: There was a discrete 95 % stenosis at a site with no  prior intervention. There was VALORIE grade 3 flow through the vessel (brisk  flow).  COMPLICATIONS: There were no complications.  DIAGNOSTIC IMPRESSIONS: Severe Multivessel CAD  Recommend CTS for CABG  INTERVENTIONAL IMPRESSIONS: Severe Multivessel CAD  Recommend CTS for CABG    < end of copied text >      Vein Mapping:    Gen: WN/WD NAD  Neuro: AAOx3, nonfocal  Pulm: CTA B/L  CV: RRR, S1S2  Abd: Soft, NT, ND +BS  Ext: No edema, + peripheral pulses      Pt has AICD/PPM [ ] Yes  [x ] No             Brand Name:  Pre-op Beta Blocker ordered within 24 hrs of surgery (CABG ONLY)?  [ x] Yes  [ ] No  If not, Why?  Type & Cross  [x ] Yes  [ ] No  NPO after Midnight [ x] Yes  [ ] No  Pre-op ABX ordered, to be taped on chart:  [x] Yes  [ ] No     Hibiclens/Peridex ordered [x ] Yes  [ ] No  Intraop on Hold: PRBCs, CXR, EULALIA x ]   Consent obtained  [x ] Yes  [ ] No

## 2021-09-09 ENCOUNTER — APPOINTMENT (OUTPATIENT)
Dept: CARDIOTHORACIC SURGERY | Facility: HOSPITAL | Age: 71
End: 2021-09-09

## 2021-09-09 LAB
ALBUMIN SERPL ELPH-MCNC: 3 G/DL — LOW (ref 3.3–5)
ALP SERPL-CCNC: 59 U/L — SIGNIFICANT CHANGE UP (ref 40–120)
ALT FLD-CCNC: 30 U/L — SIGNIFICANT CHANGE UP (ref 10–45)
ANION GAP SERPL CALC-SCNC: 15 MMOL/L — SIGNIFICANT CHANGE UP (ref 5–17)
APTT BLD: 33 SEC — SIGNIFICANT CHANGE UP (ref 27.5–35.5)
APTT BLD: 71.2 SEC — HIGH (ref 27.5–35.5)
AST SERPL-CCNC: 33 U/L — SIGNIFICANT CHANGE UP (ref 10–40)
BASOPHILS # BLD AUTO: 0 K/UL — SIGNIFICANT CHANGE UP (ref 0–0.2)
BASOPHILS NFR BLD AUTO: 0 % — SIGNIFICANT CHANGE UP (ref 0–2)
BILIRUB SERPL-MCNC: 1.5 MG/DL — HIGH (ref 0.2–1.2)
BUN SERPL-MCNC: 14 MG/DL — SIGNIFICANT CHANGE UP (ref 7–23)
CALCIUM SERPL-MCNC: 9.2 MG/DL — SIGNIFICANT CHANGE UP (ref 8.4–10.5)
CHLORIDE SERPL-SCNC: 102 MMOL/L — SIGNIFICANT CHANGE UP (ref 96–108)
CK MB BLD-MCNC: 5.9 % — HIGH (ref 0–3.5)
CK MB CFR SERPL CALC: 10.6 NG/ML — HIGH (ref 0–6.7)
CK SERPL-CCNC: 181 U/L — SIGNIFICANT CHANGE UP (ref 30–200)
CO2 SERPL-SCNC: 20 MMOL/L — LOW (ref 22–31)
CREAT SERPL-MCNC: 0.88 MG/DL — SIGNIFICANT CHANGE UP (ref 0.5–1.3)
EOSINOPHIL # BLD AUTO: 0.22 K/UL — SIGNIFICANT CHANGE UP (ref 0–0.5)
EOSINOPHIL NFR BLD AUTO: 1 % — SIGNIFICANT CHANGE UP (ref 0–6)
FIBRINOGEN PPP-MCNC: 480 MG/DL — SIGNIFICANT CHANGE UP (ref 290–520)
GAS PNL BLDA: SIGNIFICANT CHANGE UP
GLUCOSE BLDC GLUCOMTR-MCNC: 115 MG/DL — HIGH (ref 70–99)
GLUCOSE BLDC GLUCOMTR-MCNC: 124 MG/DL — HIGH (ref 70–99)
GLUCOSE BLDC GLUCOMTR-MCNC: 128 MG/DL — HIGH (ref 70–99)
GLUCOSE BLDC GLUCOMTR-MCNC: 147 MG/DL — HIGH (ref 70–99)
GLUCOSE BLDC GLUCOMTR-MCNC: 156 MG/DL — HIGH (ref 70–99)
GLUCOSE BLDC GLUCOMTR-MCNC: 163 MG/DL — HIGH (ref 70–99)
GLUCOSE BLDC GLUCOMTR-MCNC: 172 MG/DL — HIGH (ref 70–99)
GLUCOSE BLDC GLUCOMTR-MCNC: 174 MG/DL — HIGH (ref 70–99)
GLUCOSE SERPL-MCNC: 183 MG/DL — HIGH (ref 70–99)
HCT VFR BLD CALC: 38 % — LOW (ref 39–50)
HCT VFR BLD CALC: 42.4 % — SIGNIFICANT CHANGE UP (ref 39–50)
HGB BLD-MCNC: 12.5 G/DL — LOW (ref 13–17)
HGB BLD-MCNC: 13.6 G/DL — SIGNIFICANT CHANGE UP (ref 13–17)
INR BLD: 1.14 RATIO — SIGNIFICANT CHANGE UP (ref 0.88–1.16)
LYMPHOCYTES # BLD AUTO: 20 % — SIGNIFICANT CHANGE UP (ref 13–44)
LYMPHOCYTES # BLD AUTO: 4.48 K/UL — HIGH (ref 1–3.3)
MAGNESIUM SERPL-MCNC: 2.6 MG/DL — SIGNIFICANT CHANGE UP (ref 1.6–2.6)
MANUAL SMEAR VERIFICATION: SIGNIFICANT CHANGE UP
MCHC RBC-ENTMCNC: 25.2 PG — LOW (ref 27–34)
MCHC RBC-ENTMCNC: 25.6 PG — LOW (ref 27–34)
MCHC RBC-ENTMCNC: 32.1 GM/DL — SIGNIFICANT CHANGE UP (ref 32–36)
MCHC RBC-ENTMCNC: 32.9 GM/DL — SIGNIFICANT CHANGE UP (ref 32–36)
MCV RBC AUTO: 77.7 FL — LOW (ref 80–100)
MCV RBC AUTO: 78.7 FL — LOW (ref 80–100)
METAMYELOCYTES # FLD: 1 % — HIGH (ref 0–0)
MONOCYTES # BLD AUTO: 1.35 K/UL — HIGH (ref 0–0.9)
MONOCYTES NFR BLD AUTO: 6 % — SIGNIFICANT CHANGE UP (ref 2–14)
NEUTROPHILS # BLD AUTO: 16.14 K/UL — HIGH (ref 1.8–7.4)
NEUTROPHILS NFR BLD AUTO: 68 % — SIGNIFICANT CHANGE UP (ref 43–77)
NEUTS BAND # BLD: 4 % — SIGNIFICANT CHANGE UP (ref 0–8)
NRBC # BLD: 0 /100 WBCS — SIGNIFICANT CHANGE UP (ref 0–0)
NRBC # BLD: 0 /100 — SIGNIFICANT CHANGE UP (ref 0–0)
PHOSPHATE SERPL-MCNC: 2.6 MG/DL — SIGNIFICANT CHANGE UP (ref 2.5–4.5)
PLAT MORPH BLD: NORMAL — SIGNIFICANT CHANGE UP
PLATELET # BLD AUTO: 220 K/UL — SIGNIFICANT CHANGE UP (ref 150–400)
PLATELET # BLD AUTO: 313 K/UL — SIGNIFICANT CHANGE UP (ref 150–400)
POTASSIUM SERPL-MCNC: 5.1 MMOL/L — SIGNIFICANT CHANGE UP (ref 3.5–5.3)
POTASSIUM SERPL-SCNC: 5.1 MMOL/L — SIGNIFICANT CHANGE UP (ref 3.5–5.3)
PROT SERPL-MCNC: 5.4 G/DL — LOW (ref 6–8.3)
PROTHROM AB SERPL-ACNC: 13.6 SEC — SIGNIFICANT CHANGE UP (ref 10.6–13.6)
RBC # BLD: 4.89 M/UL — SIGNIFICANT CHANGE UP (ref 4.2–5.8)
RBC # BLD: 5.39 M/UL — SIGNIFICANT CHANGE UP (ref 4.2–5.8)
RBC # FLD: 14.5 % — SIGNIFICANT CHANGE UP (ref 10.3–14.5)
RBC # FLD: 14.6 % — HIGH (ref 10.3–14.5)
RBC BLD AUTO: SIGNIFICANT CHANGE UP
SODIUM SERPL-SCNC: 137 MMOL/L — SIGNIFICANT CHANGE UP (ref 135–145)
TROPONIN T, HIGH SENSITIVITY RESULT: 185 NG/L — HIGH (ref 0–51)
WBC # BLD: 22.42 K/UL — HIGH (ref 3.8–10.5)
WBC # BLD: 9.68 K/UL — SIGNIFICANT CHANGE UP (ref 3.8–10.5)
WBC # FLD AUTO: 22.42 K/UL — HIGH (ref 3.8–10.5)
WBC # FLD AUTO: 9.68 K/UL — SIGNIFICANT CHANGE UP (ref 3.8–10.5)

## 2021-09-09 PROCEDURE — 33508 ENDOSCOPIC VEIN HARVEST: CPT | Mod: 59

## 2021-09-09 PROCEDURE — 93010 ELECTROCARDIOGRAM REPORT: CPT

## 2021-09-09 PROCEDURE — 33533 CABG ARTERIAL SINGLE: CPT | Mod: AS

## 2021-09-09 PROCEDURE — 71045 X-RAY EXAM CHEST 1 VIEW: CPT | Mod: 26

## 2021-09-09 PROCEDURE — 33518 CABG ARTERY-VEIN TWO: CPT | Mod: AS

## 2021-09-09 PROCEDURE — 33518 CABG ARTERY-VEIN TWO: CPT

## 2021-09-09 PROCEDURE — 99292 CRITICAL CARE ADDL 30 MIN: CPT

## 2021-09-09 PROCEDURE — 99291 CRITICAL CARE FIRST HOUR: CPT

## 2021-09-09 PROCEDURE — 33533 CABG ARTERIAL SINGLE: CPT

## 2021-09-09 RX ORDER — POTASSIUM CHLORIDE 20 MEQ
10 PACKET (EA) ORAL
Refills: 0 | Status: DISCONTINUED | OUTPATIENT
Start: 2021-09-09 | End: 2021-09-12

## 2021-09-09 RX ORDER — DEXTROSE 50 % IN WATER 50 %
50 SYRINGE (ML) INTRAVENOUS
Refills: 0 | Status: DISCONTINUED | OUTPATIENT
Start: 2021-09-09 | End: 2021-09-14

## 2021-09-09 RX ORDER — ASPIRIN/CALCIUM CARB/MAGNESIUM 324 MG
300 TABLET ORAL ONCE
Refills: 0 | Status: DISCONTINUED | OUTPATIENT
Start: 2021-09-09 | End: 2021-09-10

## 2021-09-09 RX ORDER — POLYETHYLENE GLYCOL 3350 17 G/17G
17 POWDER, FOR SOLUTION ORAL DAILY
Refills: 0 | Status: DISCONTINUED | OUTPATIENT
Start: 2021-09-09 | End: 2021-09-14

## 2021-09-09 RX ORDER — NOREPINEPHRINE BITARTRATE/D5W 8 MG/250ML
0.08 PLASTIC BAG, INJECTION (ML) INTRAVENOUS
Qty: 8 | Refills: 0 | Status: DISCONTINUED | OUTPATIENT
Start: 2021-09-09 | End: 2021-09-11

## 2021-09-09 RX ORDER — DEXTROSE 50 % IN WATER 50 %
25 SYRINGE (ML) INTRAVENOUS
Refills: 0 | Status: DISCONTINUED | OUTPATIENT
Start: 2021-09-09 | End: 2021-09-14

## 2021-09-09 RX ORDER — ALBUMIN HUMAN 25 %
250 VIAL (ML) INTRAVENOUS ONCE
Refills: 0 | Status: COMPLETED | OUTPATIENT
Start: 2021-09-09 | End: 2021-09-09

## 2021-09-09 RX ORDER — OXYCODONE AND ACETAMINOPHEN 5; 325 MG/1; MG/1
1 TABLET ORAL EVERY 4 HOURS
Refills: 0 | Status: DISCONTINUED | OUTPATIENT
Start: 2021-09-09 | End: 2021-09-14

## 2021-09-09 RX ORDER — CHLORHEXIDINE GLUCONATE 213 G/1000ML
1 SOLUTION TOPICAL
Refills: 0 | Status: DISCONTINUED | OUTPATIENT
Start: 2021-09-09 | End: 2021-09-12

## 2021-09-09 RX ORDER — CEFUROXIME AXETIL 250 MG
1500 TABLET ORAL EVERY 8 HOURS
Refills: 0 | Status: COMPLETED | OUTPATIENT
Start: 2021-09-09 | End: 2021-09-11

## 2021-09-09 RX ORDER — CHLORHEXIDINE GLUCONATE 213 G/1000ML
15 SOLUTION TOPICAL EVERY 12 HOURS
Refills: 0 | Status: DISCONTINUED | OUTPATIENT
Start: 2021-09-09 | End: 2021-09-10

## 2021-09-09 RX ORDER — HYDROMORPHONE HYDROCHLORIDE 2 MG/ML
0.5 INJECTION INTRAMUSCULAR; INTRAVENOUS; SUBCUTANEOUS ONCE
Refills: 0 | Status: DISCONTINUED | OUTPATIENT
Start: 2021-09-09 | End: 2021-09-09

## 2021-09-09 RX ORDER — SODIUM CHLORIDE 9 MG/ML
1000 INJECTION INTRAMUSCULAR; INTRAVENOUS; SUBCUTANEOUS
Refills: 0 | Status: DISCONTINUED | OUTPATIENT
Start: 2021-09-09 | End: 2021-09-12

## 2021-09-09 RX ORDER — INSULIN HUMAN 100 [IU]/ML
3 INJECTION, SOLUTION SUBCUTANEOUS
Qty: 100 | Refills: 0 | Status: DISCONTINUED | OUTPATIENT
Start: 2021-09-09 | End: 2021-09-12

## 2021-09-09 RX ORDER — POTASSIUM CHLORIDE 20 MEQ
10 PACKET (EA) ORAL
Refills: 0 | Status: COMPLETED | OUTPATIENT
Start: 2021-09-09 | End: 2021-09-10

## 2021-09-09 RX ORDER — ASPIRIN/CALCIUM CARB/MAGNESIUM 324 MG
81 TABLET ORAL DAILY
Refills: 0 | Status: DISCONTINUED | OUTPATIENT
Start: 2021-09-09 | End: 2021-09-14

## 2021-09-09 RX ORDER — FAMOTIDINE 10 MG/ML
20 INJECTION INTRAVENOUS EVERY 12 HOURS
Refills: 0 | Status: DISCONTINUED | OUTPATIENT
Start: 2021-09-09 | End: 2021-09-10

## 2021-09-09 RX ORDER — SODIUM CHLORIDE 9 MG/ML
500 INJECTION, SOLUTION INTRAVENOUS
Refills: 0 | Status: COMPLETED | OUTPATIENT
Start: 2021-09-09 | End: 2021-09-09

## 2021-09-09 RX ORDER — DEXMEDETOMIDINE HYDROCHLORIDE IN 0.9% SODIUM CHLORIDE 4 UG/ML
0.6 INJECTION INTRAVENOUS
Qty: 200 | Refills: 0 | Status: DISCONTINUED | OUTPATIENT
Start: 2021-09-09 | End: 2021-09-10

## 2021-09-09 RX ORDER — OXYCODONE AND ACETAMINOPHEN 5; 325 MG/1; MG/1
2 TABLET ORAL EVERY 6 HOURS
Refills: 0 | Status: DISCONTINUED | OUTPATIENT
Start: 2021-09-09 | End: 2021-09-14

## 2021-09-09 RX ADMIN — CHLORHEXIDINE GLUCONATE 15 MILLILITER(S): 213 SOLUTION TOPICAL at 18:58

## 2021-09-09 RX ADMIN — Medication 1500 MILLILITER(S): at 20:44

## 2021-09-09 RX ADMIN — SODIUM CHLORIDE 2000 MILLILITER(S): 9 INJECTION, SOLUTION INTRAVENOUS at 18:30

## 2021-09-09 RX ADMIN — HYDROMORPHONE HYDROCHLORIDE 0.5 MILLIGRAM(S): 2 INJECTION INTRAMUSCULAR; INTRAVENOUS; SUBCUTANEOUS at 20:58

## 2021-09-09 RX ADMIN — SODIUM CHLORIDE 2000 MILLILITER(S): 9 INJECTION, SOLUTION INTRAVENOUS at 18:00

## 2021-09-09 RX ADMIN — SODIUM CHLORIDE 3 MILLILITER(S): 9 INJECTION INTRAMUSCULAR; INTRAVENOUS; SUBCUTANEOUS at 05:16

## 2021-09-09 RX ADMIN — Medication 100 MILLIEQUIVALENT(S): at 21:10

## 2021-09-09 RX ADMIN — Medication 500 MILLILITER(S): at 20:45

## 2021-09-09 RX ADMIN — Medication 500 MILLILITER(S): at 21:11

## 2021-09-09 RX ADMIN — Medication 125 MILLILITER(S): at 20:14

## 2021-09-09 RX ADMIN — INSULIN HUMAN 3 UNIT(S)/HR: 100 INJECTION, SOLUTION SUBCUTANEOUS at 20:44

## 2021-09-09 RX ADMIN — Medication 25 MILLIGRAM(S): at 05:19

## 2021-09-09 RX ADMIN — Medication 100 MILLIEQUIVALENT(S): at 22:09

## 2021-09-09 RX ADMIN — PANTOPRAZOLE SODIUM 40 MILLIGRAM(S): 20 TABLET, DELAYED RELEASE ORAL at 05:20

## 2021-09-09 RX ADMIN — HYDROMORPHONE HYDROCHLORIDE 0.5 MILLIGRAM(S): 2 INJECTION INTRAMUSCULAR; INTRAVENOUS; SUBCUTANEOUS at 20:43

## 2021-09-09 RX ADMIN — Medication 10.2 MICROGRAM(S)/KG/MIN: at 20:44

## 2021-09-09 RX ADMIN — Medication 100 MILLIGRAM(S): at 20:43

## 2021-09-09 NOTE — PROGRESS NOTE ADULT - SUBJECTIVE AND OBJECTIVE BOX
MAEVE JARVIS  MRN-[id number]  Patient is a 71y old  Male who presents with a chief complaint of CABG (08 Sep 2021 15:00)    HPI:  70 y/o male w/ no reported pmhx presents w/ 2 episodes of unprovoked chest pain since this morning found to have elevated cardiac enzymes with abnormal EKG, admitted to [facility] for NSTEMI. Had cardiac cath that showed 3v disease and is now planned for CABG eval at [facility].    (04 Sep 2021 21:07)      Surgery/Hospital course:  9/9 C3L     REVIEW OF SYSTEMS:  Gen: No fever  EYES/ENT: No visual changes;  No vertigo or throat pain   NECK: No pain   RES:  No shortness of breath or Cough  CARD: + chest pain   GI: No abdominal pain  : No dysuria  NEURO: No weakness  SKIN: No itching, rashes     Vital Signs Last 24 Hrs  T(C): 35.8 (09 Sep 2021 17:55), Max: 37 (08 Sep 2021 20:03)  T(F): 96.5 (09 Sep 2021 17:55), Max: 98.6 (08 Sep 2021 20:03)  HR: 86 (09 Sep 2021 19:00) (76 - 86)  BP: 132/78 (09 Sep 2021 10:20) (128/77 - 132/78)  BP(mean): 96 (09 Sep 2021 10:20) (96 - 96)  RR: 15 (09 Sep 2021 19:00) (12 - 18)  SpO2: 99% (09 Sep 2021 19:00) (96% - 100%)    ============================I/O===========================   I&O's Detail    08 Sep 2021 07:01  -  09 Sep 2021 07:00  --------------------------------------------------------  IN:    Heparin: 29 mL    Heparin: 108 mL    Oral Fluid: 920 mL  Total IN: 1057 mL    OUT:    Voided (mL): 1825 mL  Total OUT: 1825 mL    Total NET: -768 mL      09 Sep 2021 07:01  -  09 Sep 2021 19:22  --------------------------------------------------------  IN:    Heparin: 30 mL    Insulin: 6 mL    Lactated Ringers Bolus: 1000 mL    Norepinephrine: 16.6 mL    sodium chloride 0.9%: 20 mL  Total IN: 1072.6 mL    OUT:    Chest Tube (mL): 0 mL    Chest Tube (mL): 55 mL    Ureteral Catheter (mL): 750 mL    Voided (mL): 400 mL  Total OUT: 1205 mL    Total NET: -132.4 mL        ============================ LABS =========================                        13.6   9.68  )-----------( 313      ( 09 Sep 2021 05:28 )             42.4     09-08    133<L>  |  102  |  17  ----------------------------<  96  4.2   |  18<L>  |  1.09    Ca    9.4      08 Sep 2021 07:02  Mg     2.2     09-08        PTT - ( 09 Sep 2021 05:28 )  PTT:71.2 sec  ABG - ( 09 Sep 2021 18:32 )  pH, Arterial: 7.36  pH, Blood: x     /  pCO2: 38    /  pO2: 171   / HCO3: 22    / Base Excess: -3.6  /  SaO2: 99.9                ======================Microbiology/Radiology=================  CXR: Reviewed  ======================================================  PAST MEDICAL & SURGICAL HISTORY:  No pertinent past medical history    History of cataract surgery      ====================ASSESSMENT ==============  CAD s/p CABG x3 on 9/9/2021  Stress Hyperglycemia   Hyponatremia  Metabolic Acidosis    Plan:  ====================== NEUROLOGY=====================  Continue to monitor neuro status per ICU protocol.   Continue with Oxycodone for pain management     oxycodone    5 mG/acetaminophen 325 mG 2 Tablet(s) Oral every 6 hours PRN Moderate Pain (4 - 6)  oxycodone    5 mG/acetaminophen 325 mG 1 Tablet(s) Oral every 4 hours PRN Mild Pain (1 - 3)    ==================== RESPIRATORY======================  On full vent support, requiring close monitoring of respiratory rate, breathing pattern, pulse oximetry monitoring, and intermittent blood gas analysis.     Mechanical Ventilation:  Mode: AC/ CMV (Assist Control/ Continuous Mandatory Ventilation)  RR (machine): 12  TV (machine): 600  FiO2: 60  PEEP: 5  ITime: 1  MAP: 8  PIP: 18      ====================CARDIOVASCULAR==================  CAD s/p CABG x3 on 9/9/2021  Continue with ASA for graft patency.   Continue with IV Levophed  for pressor support  Invasive hemodynamic monitoring, assess perfusion indices.   Back up pacing wires in place.    aspirin Suppository 300 milliGRAM(s) Rectal once  aspirin enteric coated 81 milliGRAM(s) Oral daily  norepinephrine Infusion 0.08 MICROgram(s)/kG/Min (10.2 mL/Hr) IV Continuous <Continuous>    ===================HEMATOLOGIC/ONC ===================  Monitor H&H/Plts       ===================== RENAL =========================  Hyponatremia  Metabolic Acidosis  Continue monitoring urine output, I&Os, Bun/Cr    ==================== GASTROINTESTINAL===================  NPO after recent procedure, advance diet as tolerated.   Continue Pepcid for stress ulcer prophylaxis.   Bowel regimen with Miralax.     famotidine Injectable 20 milliGRAM(s) IV Push every 12 hours  polyethylene glycol 3350 17 Gram(s) Oral daily  potassium chloride  10 mEq/50 mL IVPB 10 milliEquivalent(s) IV Intermittent every 1 hour  potassium chloride  10 mEq/50 mL IVPB 10 milliEquivalent(s) IV Intermittent every 1 hour  potassium chloride  10 mEq/50 mL IVPB 10 milliEquivalent(s) IV Intermittent every 1 hour  sodium chloride 0.9%. 1000 milliLiter(s) (10 mL/Hr) IV Continuous <Continuous>    =======================    ENDOCRINE  =====================  Stress Hyperglycemia, requiring glucose control with insulin gtt, titrate as per insulin drip protocol along with hourly glucose checks.     dextrose 50% Injectable 50 milliLiter(s) IV Push every 15 minutes  dextrose 50% Injectable 25 milliLiter(s) IV Push every 15 minutes  insulin regular Infusion 3 Unit(s)/Hr (3 mL/Hr) IV Continuous <Continuous>    ========================INFECTIOUS DISEASE================  Afebrile, WBC within normal limits.   Continue Cefuroxime for perioperative antibiotic coverage.    cefuroxime  IVPB 1500 milliGRAM(s) IV Intermittent every 8 hours        Patient requires continuous monitoring with bedside rhythm monitoring, pulse ox monitoring, and intermittent blood gas analysis. Care plan discussed with ICU care team. Patient remained critical and at risk for life threatening decompensation.    By signing my name below, ICollette, attest that this documentation has been prepared under the direction and in the presence of Rick Villarreal MD   Electronically signed: Kushal Blevinsibe    I, Rick Villarreal, personally performed the services described in this documentation. all medical record entries made by the scribe were at my direction and in my presence. I have reviewed the chart and agree that the record reflects my personal performance and is accurate and complete  Electronically signed: Rick Villarreal MD 09-09-21 @ 19:22       MAEVE JARVIS  MRN-[id number]  Patient is a 71y old  Male who presents with a chief complaint of CABG (08 Sep 2021 15:00)    HPI:  70 y/o male w/ no reported pmhx presents w/ 2 episodes of unprovoked chest pain since this morning found to have elevated cardiac enzymes with abnormal EKG, admitted to [facility] for NSTEMI. Had cardiac cath that showed 3v disease and is now planned for CABG eval at [facility].    (04 Sep 2021 21:07)      Surgery/Hospital course:  9/9 C3L     REVIEW OF SYSTEMS:  Gen: No fever  EYES/ENT: No visual changes;  No vertigo or throat pain   NECK: No pain   RES:  No shortness of breath or Cough  CARD: + chest pain   GI: No abdominal pain  : No dysuria  NEURO: No weakness  SKIN: No itching, rashes     Vital Signs Last 24 Hrs  T(C): 35.8 (09 Sep 2021 17:55), Max: 37 (08 Sep 2021 20:03)  T(F): 96.5 (09 Sep 2021 17:55), Max: 98.6 (08 Sep 2021 20:03)  HR: 86 (09 Sep 2021 19:00) (76 - 86)  BP: 132/78 (09 Sep 2021 10:20) (128/77 - 132/78)  BP(mean): 96 (09 Sep 2021 10:20) (96 - 96)  RR: 15 (09 Sep 2021 19:00) (12 - 18)  SpO2: 99% (09 Sep 2021 19:00) (96% - 100%)    ============================I/O===========================   I&O's Detail    08 Sep 2021 07:01  -  09 Sep 2021 07:00  --------------------------------------------------------  IN:    Heparin: 29 mL    Heparin: 108 mL    Oral Fluid: 920 mL  Total IN: 1057 mL    OUT:    Voided (mL): 1825 mL  Total OUT: 1825 mL    Total NET: -768 mL      09 Sep 2021 07:01  -  09 Sep 2021 19:22  --------------------------------------------------------  IN:    Heparin: 30 mL    Insulin: 6 mL    Lactated Ringers Bolus: 1000 mL    Norepinephrine: 16.6 mL    sodium chloride 0.9%: 20 mL  Total IN: 1072.6 mL    OUT:    Chest Tube (mL): 0 mL    Chest Tube (mL): 55 mL    Ureteral Catheter (mL): 750 mL    Voided (mL): 400 mL  Total OUT: 1205 mL    Total NET: -132.4 mL        ============================ LABS =========================                        13.6   9.68  )-----------( 313      ( 09 Sep 2021 05:28 )             42.4     09-08    133<L>  |  102  |  17  ----------------------------<  96  4.2   |  18<L>  |  1.09    Ca    9.4      08 Sep 2021 07:02  Mg     2.2     09-08        PTT - ( 09 Sep 2021 05:28 )  PTT:71.2 sec  ABG - ( 09 Sep 2021 18:32 )  pH, Arterial: 7.36  pH, Blood: x     /  pCO2: 38    /  pO2: 171   / HCO3: 22    / Base Excess: -3.6  /  SaO2: 99.9                ======================Microbiology/Radiology=================  CXR: Reviewed  ======================================================  PAST MEDICAL & SURGICAL HISTORY:  No pertinent past medical history    History of cataract surgery      ====================ASSESSMENT ==============  CAD s/p CABG x3 on 9/9/2021  Shock  Respiratory Failure  Stress Hyperglycemia   Hyponatremia  Metabolic Acidosis    Plan:  ====================== NEUROLOGY=====================  Continue to monitor neuro status per ICU protocol.   Continue with Oxycodone for pain management     oxycodone    5 mG/acetaminophen 325 mG 2 Tablet(s) Oral every 6 hours PRN Moderate Pain (4 - 6)  oxycodone    5 mG/acetaminophen 325 mG 1 Tablet(s) Oral every 4 hours PRN Mild Pain (1 - 3)    ==================== RESPIRATORY======================  On full vent support, requiring close monitoring of respiratory rate, breathing pattern, pulse oximetry monitoring, and intermittent blood gas analysis.     Mechanical Ventilation:  Mode: AC/ CMV (Assist Control/ Continuous Mandatory Ventilation)  RR (machine): 12  TV (machine): 600  FiO2: 60  PEEP: 5  ITime: 1  MAP: 8  PIP: 18      ====================CARDIOVASCULAR==================  CAD s/p CABG x3 on 9/9/2021  Continue with ASA for graft patency.   Continue with IV Levophed  for pressor support  Invasive hemodynamic monitoring, assess perfusion indices.   Back up pacing wires in place.    aspirin Suppository 300 milliGRAM(s) Rectal once  aspirin enteric coated 81 milliGRAM(s) Oral daily  norepinephrine Infusion 0.08 MICROgram(s)/kG/Min (10.2 mL/Hr) IV Continuous <Continuous>    ===================HEMATOLOGIC/ONC ===================  Monitor H&H/Plts       ===================== RENAL =========================  Hyponatremia  Metabolic Acidosis  Continue monitoring urine output, I&Os, Bun/Cr    ==================== GASTROINTESTINAL===================  NPO after recent procedure, advance diet as tolerated.   Continue Pepcid for stress ulcer prophylaxis.   Bowel regimen with Miralax.     famotidine Injectable 20 milliGRAM(s) IV Push every 12 hours  polyethylene glycol 3350 17 Gram(s) Oral daily  potassium chloride  10 mEq/50 mL IVPB 10 milliEquivalent(s) IV Intermittent every 1 hour  potassium chloride  10 mEq/50 mL IVPB 10 milliEquivalent(s) IV Intermittent every 1 hour  potassium chloride  10 mEq/50 mL IVPB 10 milliEquivalent(s) IV Intermittent every 1 hour  sodium chloride 0.9%. 1000 milliLiter(s) (10 mL/Hr) IV Continuous <Continuous>    =======================    ENDOCRINE  =====================  Stress Hyperglycemia, requiring glucose control with insulin gtt, titrate as per insulin drip protocol along with hourly glucose checks.     dextrose 50% Injectable 50 milliLiter(s) IV Push every 15 minutes  dextrose 50% Injectable 25 milliLiter(s) IV Push every 15 minutes  insulin regular Infusion 3 Unit(s)/Hr (3 mL/Hr) IV Continuous <Continuous>    ========================INFECTIOUS DISEASE================  Afebrile, WBC within normal limits.   Continue Cefuroxime for perioperative antibiotic coverage.    cefuroxime  IVPB 1500 milliGRAM(s) IV Intermittent every 8 hours        Patient requires continuous monitoring with bedside rhythm monitoring, pulse ox monitoring, and intermittent blood gas analysis. Care plan discussed with ICU care team. Patient remained critical and at risk for life threatening decompensation.    By signing my name below, ICollette, attest that this documentation has been prepared under the direction and in the presence of Rick Villarreal MD   Electronically signed: Kushal Blevinsibe    I, Rick Villarreal, personally performed the services described in this documentation. all medical record entries made by the scribe were at my direction and in my presence. I have reviewed the chart and agree that the record reflects my personal performance and is accurate and complete  Electronically signed: Rick Villarreal MD 09-09-21 @ 19:22

## 2021-09-09 NOTE — BRIEF OPERATIVE NOTE - COMMENTS
I first assisted for the entirety of the case, including sternotomy, cardiopulmonary bypass, placement of coronary grafts and closing.  GSV harvest was performed endoscopically using [place]quet by GABRIELLE QUEVEDO

## 2021-09-09 NOTE — PRE-ANESTHESIA EVALUATION ADULT - NSANTHOSAYNRD_GEN_A_CORE
No. MAXIME screening performed.  STOP BANG Legend: 0-2 = LOW Risk; 3-4 = INTERMEDIATE Risk; 5-8 = HIGH Risk

## 2021-09-09 NOTE — BRIEF OPERATIVE NOTE - NSICDXBRIEFPROCEDURE_GEN_ALL_CORE_FT
PROCEDURES:  CABG, 1 arterial and 2 venous 09-Sep-2021 18:11:23 [place]->LAD, SVG->RCA, SVG->OM1 Raymond Carvajal

## 2021-09-10 LAB
ALBUMIN SERPL ELPH-MCNC: 3.8 G/DL — SIGNIFICANT CHANGE UP (ref 3.3–5)
ALP SERPL-CCNC: 38 U/L — LOW (ref 40–120)
ALT FLD-CCNC: 26 U/L — SIGNIFICANT CHANGE UP (ref 10–45)
ANION GAP SERPL CALC-SCNC: 13 MMOL/L — SIGNIFICANT CHANGE UP (ref 5–17)
AST SERPL-CCNC: 34 U/L — SIGNIFICANT CHANGE UP (ref 10–40)
BASOPHILS # BLD AUTO: 0.01 K/UL — SIGNIFICANT CHANGE UP (ref 0–0.2)
BASOPHILS NFR BLD AUTO: 0.1 % — SIGNIFICANT CHANGE UP (ref 0–2)
BILIRUB SERPL-MCNC: 1.9 MG/DL — HIGH (ref 0.2–1.2)
BUN SERPL-MCNC: 13 MG/DL — SIGNIFICANT CHANGE UP (ref 7–23)
CALCIUM SERPL-MCNC: 8.5 MG/DL — SIGNIFICANT CHANGE UP (ref 8.4–10.5)
CHLORIDE SERPL-SCNC: 107 MMOL/L — SIGNIFICANT CHANGE UP (ref 96–108)
CO2 SERPL-SCNC: 22 MMOL/L — SIGNIFICANT CHANGE UP (ref 22–31)
CREAT SERPL-MCNC: 1.06 MG/DL — SIGNIFICANT CHANGE UP (ref 0.5–1.3)
EOSINOPHIL # BLD AUTO: 0 K/UL — SIGNIFICANT CHANGE UP (ref 0–0.5)
EOSINOPHIL NFR BLD AUTO: 0 % — SIGNIFICANT CHANGE UP (ref 0–6)
GAS PNL BLDA: SIGNIFICANT CHANGE UP
GLUCOSE BLDC GLUCOMTR-MCNC: 101 MG/DL — HIGH (ref 70–99)
GLUCOSE BLDC GLUCOMTR-MCNC: 102 MG/DL — HIGH (ref 70–99)
GLUCOSE BLDC GLUCOMTR-MCNC: 105 MG/DL — HIGH (ref 70–99)
GLUCOSE BLDC GLUCOMTR-MCNC: 106 MG/DL — HIGH (ref 70–99)
GLUCOSE BLDC GLUCOMTR-MCNC: 110 MG/DL — HIGH (ref 70–99)
GLUCOSE BLDC GLUCOMTR-MCNC: 113 MG/DL — HIGH (ref 70–99)
GLUCOSE BLDC GLUCOMTR-MCNC: 115 MG/DL — HIGH (ref 70–99)
GLUCOSE BLDC GLUCOMTR-MCNC: 118 MG/DL — HIGH (ref 70–99)
GLUCOSE BLDC GLUCOMTR-MCNC: 126 MG/DL — HIGH (ref 70–99)
GLUCOSE BLDC GLUCOMTR-MCNC: 131 MG/DL — HIGH (ref 70–99)
GLUCOSE BLDC GLUCOMTR-MCNC: 133 MG/DL — HIGH (ref 70–99)
GLUCOSE BLDC GLUCOMTR-MCNC: 138 MG/DL — HIGH (ref 70–99)
GLUCOSE BLDC GLUCOMTR-MCNC: 140 MG/DL — HIGH (ref 70–99)
GLUCOSE BLDC GLUCOMTR-MCNC: 151 MG/DL — HIGH (ref 70–99)
GLUCOSE BLDC GLUCOMTR-MCNC: 157 MG/DL — HIGH (ref 70–99)
GLUCOSE BLDC GLUCOMTR-MCNC: 160 MG/DL — HIGH (ref 70–99)
GLUCOSE BLDC GLUCOMTR-MCNC: 162 MG/DL — HIGH (ref 70–99)
GLUCOSE BLDC GLUCOMTR-MCNC: 175 MG/DL — HIGH (ref 70–99)
GLUCOSE BLDC GLUCOMTR-MCNC: 193 MG/DL — HIGH (ref 70–99)
GLUCOSE BLDC GLUCOMTR-MCNC: 203 MG/DL — HIGH (ref 70–99)
GLUCOSE BLDC GLUCOMTR-MCNC: 209 MG/DL — HIGH (ref 70–99)
GLUCOSE BLDC GLUCOMTR-MCNC: 75 MG/DL — SIGNIFICANT CHANGE UP (ref 70–99)
GLUCOSE BLDC GLUCOMTR-MCNC: 96 MG/DL — SIGNIFICANT CHANGE UP (ref 70–99)
GLUCOSE SERPL-MCNC: 116 MG/DL — HIGH (ref 70–99)
HCT VFR BLD CALC: 28.6 % — LOW (ref 39–50)
HGB BLD-MCNC: 9.4 G/DL — LOW (ref 13–17)
IMM GRANULOCYTES NFR BLD AUTO: 0.5 % — SIGNIFICANT CHANGE UP (ref 0–1.5)
LYMPHOCYTES # BLD AUTO: 1.41 K/UL — SIGNIFICANT CHANGE UP (ref 1–3.3)
LYMPHOCYTES # BLD AUTO: 12.6 % — LOW (ref 13–44)
MAGNESIUM SERPL-MCNC: 2.1 MG/DL — SIGNIFICANT CHANGE UP (ref 1.6–2.6)
MCHC RBC-ENTMCNC: 25 PG — LOW (ref 27–34)
MCHC RBC-ENTMCNC: 32.9 GM/DL — SIGNIFICANT CHANGE UP (ref 32–36)
MCV RBC AUTO: 76.1 FL — LOW (ref 80–100)
MONOCYTES # BLD AUTO: 0.37 K/UL — SIGNIFICANT CHANGE UP (ref 0–0.9)
MONOCYTES NFR BLD AUTO: 3.3 % — SIGNIFICANT CHANGE UP (ref 2–14)
NEUTROPHILS # BLD AUTO: 9.35 K/UL — HIGH (ref 1.8–7.4)
NEUTROPHILS NFR BLD AUTO: 83.5 % — HIGH (ref 43–77)
NRBC # BLD: 0 /100 WBCS — SIGNIFICANT CHANGE UP (ref 0–0)
PHOSPHATE SERPL-MCNC: 1.9 MG/DL — LOW (ref 2.5–4.5)
PLATELET # BLD AUTO: 175 K/UL — SIGNIFICANT CHANGE UP (ref 150–400)
POTASSIUM SERPL-MCNC: 4.1 MMOL/L — SIGNIFICANT CHANGE UP (ref 3.5–5.3)
POTASSIUM SERPL-SCNC: 4.1 MMOL/L — SIGNIFICANT CHANGE UP (ref 3.5–5.3)
PROT SERPL-MCNC: 5.6 G/DL — LOW (ref 6–8.3)
RBC # BLD: 3.76 M/UL — LOW (ref 4.2–5.8)
RBC # FLD: 14.1 % — SIGNIFICANT CHANGE UP (ref 10.3–14.5)
SODIUM SERPL-SCNC: 142 MMOL/L — SIGNIFICANT CHANGE UP (ref 135–145)
WBC # BLD: 11.2 K/UL — HIGH (ref 3.8–10.5)
WBC # FLD AUTO: 11.2 K/UL — HIGH (ref 3.8–10.5)

## 2021-09-10 PROCEDURE — 71045 X-RAY EXAM CHEST 1 VIEW: CPT | Mod: 26

## 2021-09-10 PROCEDURE — 99232 SBSQ HOSP IP/OBS MODERATE 35: CPT

## 2021-09-10 PROCEDURE — 93010 ELECTROCARDIOGRAM REPORT: CPT

## 2021-09-10 RX ORDER — PANTOPRAZOLE SODIUM 20 MG/1
40 TABLET, DELAYED RELEASE ORAL
Refills: 0 | Status: DISCONTINUED | OUTPATIENT
Start: 2021-09-10 | End: 2021-09-14

## 2021-09-10 RX ORDER — ATORVASTATIN CALCIUM 80 MG/1
40 TABLET, FILM COATED ORAL AT BEDTIME
Refills: 0 | Status: DISCONTINUED | OUTPATIENT
Start: 2021-09-10 | End: 2021-09-10

## 2021-09-10 RX ORDER — ASPIRIN/CALCIUM CARB/MAGNESIUM 324 MG
325 TABLET ORAL ONCE
Refills: 0 | Status: COMPLETED | OUTPATIENT
Start: 2021-09-10 | End: 2021-09-10

## 2021-09-10 RX ORDER — ATORVASTATIN CALCIUM 80 MG/1
80 TABLET, FILM COATED ORAL AT BEDTIME
Refills: 0 | Status: DISCONTINUED | OUTPATIENT
Start: 2021-09-10 | End: 2021-09-14

## 2021-09-10 RX ORDER — HYDROMORPHONE HYDROCHLORIDE 2 MG/ML
0.5 INJECTION INTRAMUSCULAR; INTRAVENOUS; SUBCUTANEOUS ONCE
Refills: 0 | Status: DISCONTINUED | OUTPATIENT
Start: 2021-09-10 | End: 2021-09-10

## 2021-09-10 RX ORDER — ALBUMIN HUMAN 25 %
250 VIAL (ML) INTRAVENOUS ONCE
Refills: 0 | Status: COMPLETED | OUTPATIENT
Start: 2021-09-10 | End: 2021-09-10

## 2021-09-10 RX ORDER — CLOPIDOGREL BISULFATE 75 MG/1
75 TABLET, FILM COATED ORAL DAILY
Refills: 0 | Status: DISCONTINUED | OUTPATIENT
Start: 2021-09-10 | End: 2021-09-14

## 2021-09-10 RX ORDER — ACETAMINOPHEN 500 MG
1000 TABLET ORAL ONCE
Refills: 0 | Status: COMPLETED | OUTPATIENT
Start: 2021-09-10 | End: 2021-09-10

## 2021-09-10 RX ORDER — METOCLOPRAMIDE HCL 10 MG
10 TABLET ORAL EVERY 8 HOURS
Refills: 0 | Status: COMPLETED | OUTPATIENT
Start: 2021-09-10 | End: 2021-09-11

## 2021-09-10 RX ORDER — ONDANSETRON 8 MG/1
4 TABLET, FILM COATED ORAL ONCE
Refills: 0 | Status: COMPLETED | OUTPATIENT
Start: 2021-09-10 | End: 2021-09-10

## 2021-09-10 RX ADMIN — PANTOPRAZOLE SODIUM 40 MILLIGRAM(S): 20 TABLET, DELAYED RELEASE ORAL at 05:50

## 2021-09-10 RX ADMIN — HYDROMORPHONE HYDROCHLORIDE 0.5 MILLIGRAM(S): 2 INJECTION INTRAMUSCULAR; INTRAVENOUS; SUBCUTANEOUS at 12:30

## 2021-09-10 RX ADMIN — HYDROMORPHONE HYDROCHLORIDE 0.5 MILLIGRAM(S): 2 INJECTION INTRAMUSCULAR; INTRAVENOUS; SUBCUTANEOUS at 00:17

## 2021-09-10 RX ADMIN — Medication 100 MILLIGRAM(S): at 21:55

## 2021-09-10 RX ADMIN — CLOPIDOGREL BISULFATE 75 MILLIGRAM(S): 75 TABLET, FILM COATED ORAL at 11:33

## 2021-09-10 RX ADMIN — HYDROMORPHONE HYDROCHLORIDE 0.5 MILLIGRAM(S): 2 INJECTION INTRAMUSCULAR; INTRAVENOUS; SUBCUTANEOUS at 18:40

## 2021-09-10 RX ADMIN — INSULIN HUMAN 3 UNIT(S)/HR: 100 INJECTION, SOLUTION SUBCUTANEOUS at 18:26

## 2021-09-10 RX ADMIN — Medication 62.5 MILLIMOLE(S): at 02:36

## 2021-09-10 RX ADMIN — Medication 125 MILLILITER(S): at 08:32

## 2021-09-10 RX ADMIN — Medication 1500 MILLILITER(S): at 05:49

## 2021-09-10 RX ADMIN — HYDROMORPHONE HYDROCHLORIDE 0.5 MILLIGRAM(S): 2 INJECTION INTRAMUSCULAR; INTRAVENOUS; SUBCUTANEOUS at 18:25

## 2021-09-10 RX ADMIN — HYDROMORPHONE HYDROCHLORIDE 0.5 MILLIGRAM(S): 2 INJECTION INTRAMUSCULAR; INTRAVENOUS; SUBCUTANEOUS at 01:07

## 2021-09-10 RX ADMIN — Medication 10 MILLIGRAM(S): at 21:55

## 2021-09-10 RX ADMIN — HYDROMORPHONE HYDROCHLORIDE 0.5 MILLIGRAM(S): 2 INJECTION INTRAMUSCULAR; INTRAVENOUS; SUBCUTANEOUS at 12:15

## 2021-09-10 RX ADMIN — OXYCODONE AND ACETAMINOPHEN 2 TABLET(S): 5; 325 TABLET ORAL at 23:18

## 2021-09-10 RX ADMIN — Medication 500 MILLILITER(S): at 02:29

## 2021-09-10 RX ADMIN — OXYCODONE AND ACETAMINOPHEN 2 TABLET(S): 5; 325 TABLET ORAL at 22:48

## 2021-09-10 RX ADMIN — Medication 100 MILLIEQUIVALENT(S): at 00:02

## 2021-09-10 RX ADMIN — Medication 10 MILLIGRAM(S): at 17:39

## 2021-09-10 RX ADMIN — CHLORHEXIDINE GLUCONATE 1 APPLICATION(S): 213 SOLUTION TOPICAL at 05:45

## 2021-09-10 RX ADMIN — HYDROMORPHONE HYDROCHLORIDE 0.5 MILLIGRAM(S): 2 INJECTION INTRAMUSCULAR; INTRAVENOUS; SUBCUTANEOUS at 01:22

## 2021-09-10 RX ADMIN — Medication 81 MILLIGRAM(S): at 11:32

## 2021-09-10 RX ADMIN — ATORVASTATIN CALCIUM 80 MILLIGRAM(S): 80 TABLET, FILM COATED ORAL at 21:54

## 2021-09-10 RX ADMIN — Medication 325 MILLIGRAM(S): at 01:07

## 2021-09-10 RX ADMIN — Medication 100 MILLIGRAM(S): at 05:49

## 2021-09-10 RX ADMIN — Medication 10 MILLIGRAM(S): at 10:10

## 2021-09-10 RX ADMIN — POLYETHYLENE GLYCOL 3350 17 GRAM(S): 17 POWDER, FOR SOLUTION ORAL at 11:32

## 2021-09-10 RX ADMIN — Medication 400 MILLIGRAM(S): at 01:07

## 2021-09-10 RX ADMIN — ONDANSETRON 4 MILLIGRAM(S): 8 TABLET, FILM COATED ORAL at 11:32

## 2021-09-10 RX ADMIN — HYDROMORPHONE HYDROCHLORIDE 0.5 MILLIGRAM(S): 2 INJECTION INTRAMUSCULAR; INTRAVENOUS; SUBCUTANEOUS at 00:02

## 2021-09-10 RX ADMIN — Medication 1000 MILLIGRAM(S): at 01:22

## 2021-09-10 RX ADMIN — Medication 100 MILLIGRAM(S): at 13:48

## 2021-09-10 NOTE — DIETITIAN INITIAL EVALUATION ADULT. - ORAL INTAKE PTA/DIET HISTORY
Pt reports slight decrease in appetite over past 1 week since in-patient secondary to current medical condition. Pt reports before admit, appetite was intact. Pt reports consuming 3 meals/day. B: roti, L: banana, D: protein with vegetables. Confirms no known food allergies. Denies Hx of chewing or swallowing issues. Reports taking multivitamin PTA.

## 2021-09-10 NOTE — DIETITIAN INITIAL EVALUATION ADULT. - CHIEF COMPLAINT
"70 y/o male w/ no reported pmhx presents w/ 2 episodes of unprovoked chest pain since this morning found to have elevated cardiac enzymes with abnormal EKG, admitted to [facility] for NSTEMI. Had cardiac cath that showed 3v disease and is now planned for CABG eval at [facility]." Pt now status post CABG x3 9/9.

## 2021-09-10 NOTE — PHYSICAL THERAPY INITIAL EVALUATION ADULT - PERTINENT HX OF CURRENT PROBLEM, REHAB EVAL
72 y/o male w/ no reported pmhx presents w/ 2 episodes of unprovoked chest pain since this morning found to have elevated cardiac enzymes with abnormal EKG, admitted to [facility] for NSTEMI. Had cardiac cath that showed 3v disease and is now s/p planned CABGx3.

## 2021-09-10 NOTE — PROGRESS NOTE ADULT - ASSESSMENT
71 year old man with no reported PMH (hasn't seen PCP in 6 yrs) presents with NSTEMI, found to have multi-vessel disease on cath and now s/p CABG on 9/9.  Patient found to have A1c of 9.2% new onset DM2.

## 2021-09-10 NOTE — PROGRESS NOTE ADULT - PROBLEM SELECTOR PLAN 1
Would continue with insulin gtt for now  Please call endocrine team when patient is ready for transition to basal/bolus  - will follow    - for discharge: TBD. possible plan to dc on oral agents (Metformin +/- SGLT2 inhibitor). He requires glucometer teaching prior to dc.

## 2021-09-10 NOTE — DIETITIAN INITIAL EVALUATION ADULT. - SIGNS/SYMPTOMS
8% weight loss x 1 week (LBM vs. fluid?), mild muscle mass loss noted HbA1c of 8.9%, newly diagnosed diabetes 8% weight loss x 1 week (LBM vs. fluid?), mild muscle mass loss noted, mild edema

## 2021-09-10 NOTE — PHYSICAL THERAPY INITIAL EVALUATION ADULT - PLANNED THERAPY INTERVENTIONS, PT EVAL
GOAL: Patient will climb stairs independently x 4 steps with use of 1 handrail within 2 weeks./bed mobility training/gait training/transfer training

## 2021-09-10 NOTE — DIETITIAN INITIAL EVALUATION ADULT. - PERTINENT MEDS FT
MEDICATIONS  (STANDING):  aspirin enteric coated 81 milliGRAM(s) Oral daily  atorvastatin 80 milliGRAM(s) Oral at bedtime  cefuroxime  IVPB 1500 milliGRAM(s) IV Intermittent every 8 hours  chlorhexidine 2% Cloths 1 Application(s) Topical <User Schedule>  clopidogrel Tablet 75 milliGRAM(s) Oral daily  dextrose 50% Injectable 50 milliLiter(s) IV Push every 15 minutes  dextrose 50% Injectable 25 milliLiter(s) IV Push every 15 minutes  insulin regular Infusion 3 Unit(s)/Hr (3 mL/Hr) IV Continuous <Continuous>  metoclopramide Injectable 10 milliGRAM(s) IV Push every 8 hours  norepinephrine Infusion 0.08 MICROgram(s)/kG/Min (10.2 mL/Hr) IV Continuous <Continuous>  pantoprazole    Tablet 40 milliGRAM(s) Oral before breakfast  polyethylene glycol 3350 17 Gram(s) Oral daily  potassium chloride  10 mEq/50 mL IVPB 10 milliEquivalent(s) IV Intermittent every 1 hour  potassium chloride  10 mEq/50 mL IVPB 10 milliEquivalent(s) IV Intermittent every 1 hour  potassium chloride  10 mEq/50 mL IVPB 10 milliEquivalent(s) IV Intermittent every 1 hour  sodium chloride 0.9%. 1000 milliLiter(s) (10 mL/Hr) IV Continuous <Continuous>    MEDICATIONS  (PRN):  oxycodone    5 mG/acetaminophen 325 mG 2 Tablet(s) Oral every 6 hours PRN Moderate Pain (4 - 6)  oxycodone    5 mG/acetaminophen 325 mG 1 Tablet(s) Oral every 4 hours PRN Mild Pain (1 - 3)

## 2021-09-10 NOTE — PROGRESS NOTE ADULT - SUBJECTIVE AND OBJECTIVE BOX
Chief Complaint:     History:    MEDICATIONS  (STANDING):  aspirin enteric coated 81 milliGRAM(s) Oral daily  atorvastatin 80 milliGRAM(s) Oral at bedtime  cefuroxime  IVPB 1500 milliGRAM(s) IV Intermittent every 8 hours  chlorhexidine 2% Cloths 1 Application(s) Topical <User Schedule>  clopidogrel Tablet 75 milliGRAM(s) Oral daily  dextrose 50% Injectable 50 milliLiter(s) IV Push every 15 minutes  dextrose 50% Injectable 25 milliLiter(s) IV Push every 15 minutes  insulin regular Infusion 3 Unit(s)/Hr (3 mL/Hr) IV Continuous <Continuous>  metoclopramide Injectable 10 milliGRAM(s) IV Push every 8 hours  norepinephrine Infusion 0.08 MICROgram(s)/kG/Min (10.2 mL/Hr) IV Continuous <Continuous>  ondansetron Injectable 4 milliGRAM(s) IV Push once  pantoprazole    Tablet 40 milliGRAM(s) Oral before breakfast  polyethylene glycol 3350 17 Gram(s) Oral daily  potassium chloride  10 mEq/50 mL IVPB 10 milliEquivalent(s) IV Intermittent every 1 hour  potassium chloride  10 mEq/50 mL IVPB 10 milliEquivalent(s) IV Intermittent every 1 hour  potassium chloride  10 mEq/50 mL IVPB 10 milliEquivalent(s) IV Intermittent every 1 hour  sodium chloride 0.9%. 1000 milliLiter(s) (10 mL/Hr) IV Continuous <Continuous>    MEDICATIONS  (PRN):  oxycodone    5 mG/acetaminophen 325 mG 2 Tablet(s) Oral every 6 hours PRN Moderate Pain (4 - 6)  oxycodone    5 mG/acetaminophen 325 mG 1 Tablet(s) Oral every 4 hours PRN Mild Pain (1 - 3)      Allergies    No Known Allergies    Intolerances      Review of Systems:  Constitutional: No fever  Eyes: No blurry vision  Neuro: No tremors  HEENT: No pain  Cardiovascular: No chest pain, palpitations  Respiratory: No SOB, no cough  GI: No nausea, vomiting, abdominal pain  : No dysuria  Skin: no rash  Psych: no depression  Endocrine: no polyuria, polydipsia  Hem/lymph: no swelling  Osteoporosis: no fractures    ALL OTHER SYSTEMS REVIEWED AND NEGATIVE    UNABLE TO OBTAIN    PHYSICAL EXAM:  VITALS: T(C): 36.4 (09-10-21 @ 08:00)  T(F): 97.6 (09-10-21 @ 08:00), Max: 98.6 (09-10-21 @ 00:00)  HR: 78 (09-10-21 @ 11:00) (66 - 92)  BP: 98/52 (09-10-21 @ 11:00) (96/52 - 102/57)  RR:  (10 - 41)  SpO2:  (93% - 100%)  Wt(kg): --  GENERAL: NAD, well-groomed, well-developed  EYES: No proptosis, no lid lag, anicteric  HEENT:  Atraumatic, Normocephalic, moist mucous membranes  THYROID: Normal size, no palpable nodules  RESPIRATORY: Clear to auscultation bilaterally; No rales, rhonchi, wheezing, or rubs  CARDIOVASCULAR: Regular rate and rhythm; No murmurs; no peripheral edema  GI: Soft, nontender, non distended, normal bowel sounds  SKIN: Dry, intact, No rashes or lesions  MUSCULOSKELETAL: Full range of motion, normal strength  NEURO: sensation intact, extraocular movements intact, no tremor, normal reflexes  PSYCH: Alert and oriented x 3, normal affect, normal mood  CUSHING'S SIGNS: no striae    POCT Blood Glucose.: 193 mg/dL (09-10-21 @ 10:54)  POCT Blood Glucose.: 157 mg/dL (09-10-21 @ 09:48)  POCT Blood Glucose.: 138 mg/dL (09-10-21 @ 09:10)  POCT Blood Glucose.: 151 mg/dL (09-10-21 @ 08:18)  POCT Blood Glucose.: 133 mg/dL (09-10-21 @ 06:48)  POCT Blood Glucose.: 115 mg/dL (09-10-21 @ 05:58)  POCT Blood Glucose.: 96 mg/dL (09-10-21 @ 05:11)  POCT Blood Glucose.: 106 mg/dL (09-10-21 @ 04:01)  POCT Blood Glucose.: 105 mg/dL (09-10-21 @ 02:58)  POCT Blood Glucose.: 102 mg/dL (09-10-21 @ 02:05)  POCT Blood Glucose.: 113 mg/dL (09-10-21 @ 01:02)  POCT Blood Glucose.: 115 mg/dL (09-09-21 @ 23:53)  POCT Blood Glucose.: 128 mg/dL (09-09-21 @ 23:03)  POCT Blood Glucose.: 156 mg/dL (09-09-21 @ 22:05)  POCT Blood Glucose.: 163 mg/dL (09-09-21 @ 20:50)  POCT Blood Glucose.: 172 mg/dL (09-09-21 @ 19:57)  POCT Blood Glucose.: 174 mg/dL (09-09-21 @ 19:01)  POCT Blood Glucose.: 124 mg/dL (09-09-21 @ 12:25)  POCT Blood Glucose.: 147 mg/dL (09-09-21 @ 08:11)  POCT Blood Glucose.: 141 mg/dL (09-08-21 @ 21:29)  POCT Blood Glucose.: 90 mg/dL (09-08-21 @ 16:28)  POCT Blood Glucose.: 173 mg/dL (09-08-21 @ 11:38)  POCT Blood Glucose.: 98 mg/dL (09-08-21 @ 07:24)  POCT Blood Glucose.: 126 mg/dL (09-07-21 @ 23:22)  POCT Blood Glucose.: 84 mg/dL (09-07-21 @ 22:23)  POCT Blood Glucose.: 84 mg/dL (09-07-21 @ 21:42)  POCT Blood Glucose.: 99 mg/dL (09-07-21 @ 16:32)  POCT Blood Glucose.: 109 mg/dL (09-07-21 @ 12:07)      09-10    142  |  107  |  13  ----------------------------<  116<H>  4.1   |  22  |  1.06    EGFR if : 80  EGFR if non : 69    Ca    8.5      09-10  Mg     2.1     09-10  Phos  1.9     09-10    TPro  5.6<L>  /  Alb  3.8  /  TBili  1.9<H>  /  DBili  x   /  AST  34  /  ALT  26  /  AlkPhos  38<L>  09-10          Thyroid Function Tests:  09-05 @ 10:39 TSH 6.00 FreeT4 -- T3 -- Anti TPO -- Anti Thyroglobulin Ab -- TSI --  09-03 @ 07:38 TSH 4.01 FreeT4 -- T3 -- Anti TPO -- Anti Thyroglobulin Ab -- TSI --                           Chief Complaint: T2DM    History: Patient is s/p CABG on 9/9.     MEDICATIONS  (STANDING):  aspirin enteric coated 81 milliGRAM(s) Oral daily  atorvastatin 80 milliGRAM(s) Oral at bedtime  cefuroxime  IVPB 1500 milliGRAM(s) IV Intermittent every 8 hours  chlorhexidine 2% Cloths 1 Application(s) Topical <User Schedule>  clopidogrel Tablet 75 milliGRAM(s) Oral daily  dextrose 50% Injectable 50 milliLiter(s) IV Push every 15 minutes  dextrose 50% Injectable 25 milliLiter(s) IV Push every 15 minutes  insulin regular Infusion 3 Unit(s)/Hr (3 mL/Hr) IV Continuous <Continuous>  metoclopramide Injectable 10 milliGRAM(s) IV Push every 8 hours  norepinephrine Infusion 0.08 MICROgram(s)/kG/Min (10.2 mL/Hr) IV Continuous <Continuous>  ondansetron Injectable 4 milliGRAM(s) IV Push once  pantoprazole    Tablet 40 milliGRAM(s) Oral before breakfast  polyethylene glycol 3350 17 Gram(s) Oral daily  potassium chloride  10 mEq/50 mL IVPB 10 milliEquivalent(s) IV Intermittent every 1 hour  potassium chloride  10 mEq/50 mL IVPB 10 milliEquivalent(s) IV Intermittent every 1 hour  potassium chloride  10 mEq/50 mL IVPB 10 milliEquivalent(s) IV Intermittent every 1 hour  sodium chloride 0.9%. 1000 milliLiter(s) (10 mL/Hr) IV Continuous <Continuous>    MEDICATIONS  (PRN):  oxycodone    5 mG/acetaminophen 325 mG 2 Tablet(s) Oral every 6 hours PRN Moderate Pain (4 - 6)  oxycodone    5 mG/acetaminophen 325 mG 1 Tablet(s) Oral every 4 hours PRN Mild Pain (1 - 3)      Allergies    No Known Allergies    Intolerances      Review of Systems:  Constitutional: No fever  Eyes: No blurry vision  Neuro: No tremors  HEENT: No pain  Cardiovascular: No chest pain, palpitations  Respiratory: No SOB, no cough  GI: No nausea, vomiting, abdominal pain  : No dysuria  Skin: no rash  Psych: no depression  Endocrine: no polyuria, polydipsia      ALL OTHER SYSTEMS REVIEWED AND NEGATIVE    PHYSICAL EXAM:  VITALS: T(C): 36.4 (09-10-21 @ 08:00)  T(F): 97.6 (09-10-21 @ 08:00), Max: 98.6 (09-10-21 @ 00:00)  HR: 78 (09-10-21 @ 11:00) (66 - 92)  BP: 98/52 (09-10-21 @ 11:00) (96/52 - 102/57)  RR:  (10 - 41)  SpO2:  (93% - 100%)  Wt(kg): --  GENERAL: NAD, well-groomed, well-developed  EYES: No proptosis, no lid lag, anicteric  HEENT:  Atraumatic, Normocephalic, moist mucous membranes  RESPIRATORY: Clear to auscultation bilaterally  CARDIOVASCULAR: Regular rate and rhythm  GI: Soft, nontender, non distended, normal bowel sounds  PSYCH: Alert and oriented x 3, normal affect, normal mood      POCT Blood Glucose.: 193 mg/dL (09-10-21 @ 10:54)  POCT Blood Glucose.: 157 mg/dL (09-10-21 @ 09:48)  POCT Blood Glucose.: 138 mg/dL (09-10-21 @ 09:10)  POCT Blood Glucose.: 151 mg/dL (09-10-21 @ 08:18)  POCT Blood Glucose.: 133 mg/dL (09-10-21 @ 06:48)  POCT Blood Glucose.: 115 mg/dL (09-10-21 @ 05:58)  POCT Blood Glucose.: 96 mg/dL (09-10-21 @ 05:11)  POCT Blood Glucose.: 106 mg/dL (09-10-21 @ 04:01)  POCT Blood Glucose.: 105 mg/dL (09-10-21 @ 02:58)  POCT Blood Glucose.: 102 mg/dL (09-10-21 @ 02:05)  POCT Blood Glucose.: 113 mg/dL (09-10-21 @ 01:02)  POCT Blood Glucose.: 115 mg/dL (09-09-21 @ 23:53)  POCT Blood Glucose.: 128 mg/dL (09-09-21 @ 23:03)  POCT Blood Glucose.: 156 mg/dL (09-09-21 @ 22:05)  POCT Blood Glucose.: 163 mg/dL (09-09-21 @ 20:50)  POCT Blood Glucose.: 172 mg/dL (09-09-21 @ 19:57)  POCT Blood Glucose.: 174 mg/dL (09-09-21 @ 19:01)  POCT Blood Glucose.: 124 mg/dL (09-09-21 @ 12:25)  POCT Blood Glucose.: 147 mg/dL (09-09-21 @ 08:11)  POCT Blood Glucose.: 141 mg/dL (09-08-21 @ 21:29)  POCT Blood Glucose.: 90 mg/dL (09-08-21 @ 16:28)  POCT Blood Glucose.: 173 mg/dL (09-08-21 @ 11:38)  POCT Blood Glucose.: 98 mg/dL (09-08-21 @ 07:24)  POCT Blood Glucose.: 126 mg/dL (09-07-21 @ 23:22)  POCT Blood Glucose.: 84 mg/dL (09-07-21 @ 22:23)  POCT Blood Glucose.: 84 mg/dL (09-07-21 @ 21:42)  POCT Blood Glucose.: 99 mg/dL (09-07-21 @ 16:32)  POCT Blood Glucose.: 109 mg/dL (09-07-21 @ 12:07)      09-10    142  |  107  |  13  ----------------------------<  116<H>  4.1   |  22  |  1.06    EGFR if : 80  EGFR if non : 69    Ca    8.5      09-10  Mg     2.1     09-10  Phos  1.9     09-10    TPro  5.6<L>  /  Alb  3.8  /  TBili  1.9<H>  /  DBili  x   /  AST  34  /  ALT  26  /  AlkPhos  38<L>  09-10          Thyroid Function Tests:  09-05 @ 10:39 TSH 6.00 FreeT4 -- T3 -- Anti TPO -- Anti Thyroglobulin Ab -- TSI --  09-03 @ 07:38 TSH 4.01 FreeT4 -- T3 -- Anti TPO -- Anti Thyroglobulin Ab -- TSI --

## 2021-09-10 NOTE — PHYSICAL THERAPY INITIAL EVALUATION ADULT - GENERAL OBSERVATIONS, REHAB EVAL
Received seated in bedside chair with + central line, A-line, tele, NC 02, nguyen catheter, chest tube x1, temp PPM, (L) LE with ACE wrap.

## 2021-09-10 NOTE — DIETITIAN INITIAL EVALUATION ADULT. - PERTINENT LABORATORY DATA
POCT Gluc: 9/10: 109, 160, 175  Gluc: 116 H, Phos: 1.9 L (pt ordered for sodium phosphate IV)  9/5: HbA1c: 8.9%

## 2021-09-10 NOTE — DIETITIAN INITIAL EVALUATION ADULT. - ADD RECOMMEND
1. Monitor diet, PO intake, GI status, weight, labs, skin integrity 2. Continue current diet as tolerated 3. RD to add diet mighty shakes 2x/day to optimize nutritional intake 4. Reinforce diabetes education once medically appropriate 5. Malnutrition notification placed in chart

## 2021-09-10 NOTE — DIETITIAN INITIAL EVALUATION ADULT. - OTHER INFO
Upon RD visit, pt reports appetite was fair for lunch today. Reports consuming glucerna shake, tea and juce at lunch. Pt's diet advanced to consistent carbohydrate this afternoon. Pt denies nausea, vomiting, constipation or diarrhea at this time. Last bowel movement 9/7 per flow sheets.    Pt reports UBW is ~165 pounds. Noted admit weight if 150 pounds (9/9/21). Upon RD visit, pt reports appetite was fair for lunch today. Reports consuming glucerna shake, tea and juce at lunch. Pt's diet advanced to consistent carbohydrate this afternoon. Pt denies nausea, vomiting, constipation or diarrhea at this time. Last bowel movement 9/7 per flow sheets.    Pt reports UBW is ~165 pounds. Noted admit weight if 150 pounds (9/9/21). Weights have fluctuated in-house likely secondary to fluid shifts. Per Esther JIMENEZ, weights of 165 pounds (9/2/21). Most recent standing weights of 149-153 pounds (9/8-9/9) per flow sheets. Will continue to monitor trends as able.    Noted pt with newly diagnosed type 2 diabetes on this admit. HbA1c found to be 8.9%. Pt ordered for insulin drip at this time. Endocrine following.    RD briefly discussed type 2 diabetes education; foods containing carbohydrates, portion sizes, pairing protein with carbohydrates, avoiding sugar sweetened beverages. Pt defer wanting written education at this time until he is back on medical floor.

## 2021-09-10 NOTE — PHYSICAL THERAPY INITIAL EVALUATION ADULT - ADDITIONAL COMMENTS
Patient reports that he lives in a split-level house with 4 steps to enter, bedroom on main level. Lives with family. Independent with all ADLs and ambulation without AD PTA.

## 2021-09-10 NOTE — PHYSICAL THERAPY INITIAL EVALUATION ADULT - ACTIVE RANGE OF MOTION EXAMINATION, REHAB EVAL
within sternal precautions/bilateral upper extremity Active ROM was WFL (within functional limits)/bilateral  lower extremity Active ROM was WFL (within functional limits)

## 2021-09-10 NOTE — PROGRESS NOTE ADULT - SUBJECTIVE AND OBJECTIVE BOX
MAEVE JARVIS  MRN-[id number]  Patient is a 71y old  Male who presents with a chief complaint of CABG (09 Sep 2021 19:22)    HPI:  72 y/o male w/ no reported pmhx presents w/ 2 episodes of unprovoked chest pain since this morning found to have elevated cardiac enzymes with abnormal EKG, admitted to [facility] for NSTEMI. 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    (04 Sep 2021 21:07)      Surgery/Hospital Course:  9/9 C3L  9/10 Extubated     Today:    REVIEW OF SYSTEMS:  Gen: No fever  EYES/ENT: No visual changes;  No vertigo or throat pain   NECK: No pain   RES:  No shortness of breath or Cough  CARD: + chest pain   GI: No abdominal pain  : No dysuria  NEURO: No weakness  SKIN: No itching, rashes       ICU Vital Signs Last 24 Hrs  T(C): 36.4 (10 Sep 2021 08:00), Max: 37 (10 Sep 2021 00:00)  T(F): 97.6 (10 Sep 2021 08:00), Max: 98.6 (10 Sep 2021 00:00)  HR: 82 (10 Sep 2021 07:30) (66 - 90)  BP: 132/78 (09 Sep 2021 10:20) (132/78 - 132/78)  BP(mean): 96 (09 Sep 2021 10:20) (96 - 96)  ABP: 131/46 (10 Sep 2021 07:30) (95/50 - 157/53)  ABP(mean): 66 (10 Sep 2021 07:30) (60 - 97)  RR: 10 (10 Sep 2021 07:30) (10 - 41)  SpO2: 100% (10 Sep 2021 07:30) (93% - 100%)      Physical Exam:  Gen:  Awake, alert, multiple lines and drains.   CNS: non focal 	  Neck: no JVD  RES : clear , no wheezing             Chest: +Chest tubes   CVS: Regular  rhythm. Normal S1/S2  Abd: Soft, non-distended. Bowel sounds present.  Skin: No rash.  Ext:  no edema    ============================I/O===========================   I&O's Detail    09 Sep 2021 07:01  -  10 Sep 2021 07:00  --------------------------------------------------------  IN:    Albumin 5%  - 250 mL: 1500 mL    Dexmedetomidine: 10.2 mL    Heparin: 30 mL    Insulin: 42 mL    IV PiggyBack: 600 mL    Lactated Ringers Bolus: 1000 mL    Norepinephrine: 87 mL    sodium chloride 0.9%: 140 mL  Total IN: 3409.2 mL    OUT:    Chest Tube (mL): 70 mL    Chest Tube (mL): 340 mL    Indwelling Catheter - Urethral (mL): 2460 mL    Ureteral Catheter (mL): 250 mL    Voided (mL): 400 mL  Total OUT: 3520 mL    Total NET: -110.8 mL        ============================ LABS =========================                        9.4    11.20 )-----------( 175      ( 10 Sep 2021 01:27 )             28.6     09-10    142  |  107  |  13  ----------------------------<  116<H>  4.1   |  22  |  1.06    Ca    8.5      10 Sep 2021 01:27  Phos  1.9     09-10  Mg     2.1     09-10    TPro  5.6<L>  /  Alb  3.8  /  TBili  1.9<H>  /  DBili  x   /  AST  34  /  ALT  26  /  AlkPhos  38<L>  09-10    LIVER FUNCTIONS - ( 10 Sep 2021 01:27 )  Alb: 3.8 g/dL / Pro: 5.6 g/dL / ALK PHOS: 38 U/L / ALT: 26 U/L / AST: 34 U/L / GGT: x           PT/INR - ( 09 Sep 2021 18:56 )   PT: 13.6 sec;   INR: 1.14 ratio         PTT - ( 09 Sep 2021 18:56 )  PTT:33.0 sec  ABG - ( 10 Sep 2021 06:50 )  pH, Arterial: 7.42  pH, Blood: x     /  pCO2: 34    /  pO2: 87    / HCO3: 22    / Base Excess: -2.0  /  SaO2: 98.7                ======================Micro/Rad/Cardio=================  CXR: Reviewed  ======================================================  PAST MEDICAL & SURGICAL HISTORY:  No pertinent past medical history    History of cataract surgery      ====================ASSESSMENT ==============  CAD s/p CABG x3 on 9/9/2021  Shock  Respiratory Failure  Stress Hyperglycemia   Hyponatremia  Metabolic Acidosis    Plan:  ====================== NEUROLOGY=====================  Nonfocal, continue to monitor neuro status per ICU protocol.   Percocet PRN for analgesia     oxycodone    5 mG/acetaminophen 325 mG 2 Tablet(s) Oral every 6 hours PRN Moderate Pain (4 - 6)  oxycodone    5 mG/acetaminophen 325 mG 1 Tablet(s) Oral every 4 hours PRN Mild Pain (1 - 3)  ==================== RESPIRATORY======================  Extubated; Receiving supplemental O2 therapy with 6L NC  Continue to monitor RR, breathing pattern, pulse ox, and ABG's.  Encourage incentive spirometry.   ====================CARDIOVASCULAR==================  CAD s/p CABG x3 on 9/9/2021  Continue with ASA/Lipitor/Clopidogrel for graft patency.   Continue with IV Levophed  for pressor support  Invasive hemodynamic monitoring, assess perfusion indices.   Back up pacing wires in place.    norepinephrine Infusion 0.08 MICROgram(s)/kG/Min (10.2 mL/Hr) IV Continuous <Continuous>  atorvastatin 80 milliGRAM(s) Oral at bedtime  aspirin enteric coated 81 milliGRAM(s) Oral daily  clopidogrel Tablet 75 milliGRAM(s) Oral daily  ===================HEMATOLOGIC/ONC ===================  Monitor H&H/Plts    Monitor chest tube outputs   ===================== RENAL =========================  Metabolic acidosis  Continue to monitor I/Os, BUN/Creatinine, and urine output  Replete lytes PRN. Keep K> 4 and Mg >2   ==================== GASTROINTESTINAL===================  Tolerating PO consistent carb diet.   Continue Protonix for stress ulcer prophylaxis.  Bowel regimen with Miralax.     pantoprazole    Tablet 40 milliGRAM(s) Oral before breakfast  polyethylene glycol 3350 17 Gram(s) Oral daily  potassium chloride  10 mEq/50 mL IVPB 10 milliEquivalent(s) IV Intermittent every 1 hour  potassium chloride  10 mEq/50 mL IVPB 10 milliEquivalent(s) IV Intermittent every 1 hour  potassium chloride  10 mEq/50 mL IVPB 10 milliEquivalent(s) IV Intermittent every 1 hour  sodium chloride 0.9%. 1000 milliLiter(s) (10 mL/Hr) IV Continuous <Continuous>  =======================    ENDOCRINE  =====================  Stress Hyperglycemia, requiring glucose control with insulin gtt, titrate as per insulin drip protocol along with hourly glucose checks.     dextrose 50% Injectable 50 milliLiter(s) IV Push every 15 minutes  dextrose 50% Injectable 25 milliLiter(s) IV Push every 15 minutes  insulin regular Infusion 3 Unit(s)/Hr (3 mL/Hr) IV Continuous <Continuous>  ========================INFECTIOUS DISEASE================  Afebrile, WBC within normal limits.   Monitor temperature and trend WBC.   Continue Cefuroxime for postoperative antibiotic coverage.    cefuroxime  IVPB 1500 milliGRAM(s) IV Intermittent every 8 hours      Patient requires continuous monitoring with bedside rhythm monitoring, pulse ox monitoring, and intermittent blood gas analysis. Care plan discussed with ICU care team. Patient remained critical and at risk for life threatening decompensation.     By signing my name below, I, Renetta Smith, attest that this documentation has been prepared under the direction and in the presence of EMY Coleman   Electronically signed: Renetta Smith Scribe, 09-10-21 @ 07:54    I, EMY Coleman , personally performed the services described in this documentation. all medical record entries made by the piloibjose rafael were at my direction and in my presence. I have reviewed the chart and agree that the record reflects my personal performance and is accurate and complete  Electronically signed: EMY Coleman        MAEVE JARVIS  MRN-[id number]  Patient is a 71y old  Male who presents with a chief complaint of CABG (09 Sep 2021 19:22)    HPI:  70 y/o male w/ no reported pmhx presents w/ 2 episodes of unprovoked chest pain since this morning found to have elevated cardiac enzymes with abnormal EKG, admitted to [place] for NSTEMI. 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    (04 Sep 2021 21:07)      Surgery/Hospital Course:  9/9 C3L  9/10 Extubated     Today: Doing well, Mediastinal drain removed, off all vasoactive medications, Volume repletion overnight for high urinary output.     REVIEW OF SYSTEMS:  Gen: No fever  EYES/ENT: No visual changes;  No vertigo or throat pain   NECK: No pain   RES:  No shortness of breath or Cough  CARD: + chest pain   GI: No abdominal pain  : No dysuria  NEURO: No weakness  SKIN: No itching, rashes       ICU Vital Signs Last 24 Hrs  T(C): 36.4 (10 Sep 2021 08:00), Max: 37 (10 Sep 2021 00:00)  T(F): 97.6 (10 Sep 2021 08:00), Max: 98.6 (10 Sep 2021 00:00)  HR: 82 (10 Sep 2021 07:30) (66 - 90)  BP: 132/78 (09 Sep 2021 10:20) (132/78 - 132/78)  BP(mean): 96 (09 Sep 2021 10:20) (96 - 96)  ABP: 131/46 (10 Sep 2021 07:30) (95/50 - 157/53)  ABP(mean): 66 (10 Sep 2021 07:30) (60 - 97)  RR: 10 (10 Sep 2021 07:30) (10 - 41)  SpO2: 100% (10 Sep 2021 07:30) (93% - 100%)      Physical Exam:  Gen:  Awake, alert, multiple lines and drains.   CNS: non focal 	  Neck: no JVD  RES : clear , no wheezing             Chest: +Chest tubes   CVS: Regular  rhythm. Normal S1/S2  Abd: Soft, non-distended. Bowel sounds present.  Skin: No rash.  Ext:  no edema    ============================I/O===========================   I&O's Detail    09 Sep 2021 07:01  -  10 Sep 2021 07:00  --------------------------------------------------------  IN:    Albumin 5%  - 250 mL: 1500 mL    Dexmedetomidine: 10.2 mL    Heparin: 30 mL    Insulin: 42 mL    IV PiggyBack: 600 mL    Lactated Ringers Bolus: 1000 mL    Norepinephrine: 87 mL    sodium chloride 0.9%: 140 mL  Total IN: 3409.2 mL    OUT:    Chest Tube (mL): 70 mL    Chest Tube (mL): 340 mL    Indwelling Catheter - Urethral (mL): 2460 mL    Ureteral Catheter (mL): 250 mL    Voided (mL): 400 mL  Total OUT: 3520 mL    Total NET: -110.8 mL        ============================ LABS =========================                        9.4    11.20 )-----------( 175      ( 10 Sep 2021 01:27 )             28.6     09-10    142  |  107  |  13  ----------------------------<  116<H>  4.1   |  22  |  1.06    Ca    8.5      10 Sep 2021 01:27  Phos  1.9     09-10  Mg     2.1     09-10    TPro  5.6<L>  /  Alb  3.8  /  TBili  1.9<H>  /  DBili  x   /  AST  34  /  ALT  26  /  AlkPhos  38<L>  09-10    LIVER FUNCTIONS - ( 10 Sep 2021 01:27 )  Alb: 3.8 g/dL / Pro: 5.6 g/dL / ALK PHOS: 38 U/L / ALT: 26 U/L / AST: 34 U/L / GGT: x           PT/INR - ( 09 Sep 2021 18:56 )   PT: 13.6 sec;   INR: 1.14 ratio         PTT - ( 09 Sep 2021 18:56 )  PTT:33.0 sec  ABG - ( 10 Sep 2021 06:50 )  pH, Arterial: 7.42  pH, Blood: x     /  pCO2: 34    /  pO2: 87    / HCO3: 22    / Base Excess: -2.0  /  SaO2: 98.7                ======================Micro/Rad/Cardio=================  CXR: Reviewed  ======================================================  PAST MEDICAL & SURGICAL HISTORY:  No pertinent past medical history    History of cataract surgery      ====================ASSESSMENT ==============  CAD s/p CABG x3 on 9/9/2021  Shock  Respiratory Failure  Stress Hyperglycemia   Hyponatremia  Metabolic Acidosis    Plan:  ====================== NEUROLOGY=====================  Nonfocal, continue to monitor neuro status per ICU protocol.   Percocet PRN for analgesia     oxycodone    5 mG/acetaminophen 325 mG 2 Tablet(s) Oral every 6 hours PRN Moderate Pain (4 - 6)  oxycodone    5 mG/acetaminophen 325 mG 1 Tablet(s) Oral every 4 hours PRN Mild Pain (1 - 3)  ==================== RESPIRATORY======================  Extubated; Receiving supplemental O2 therapy with 3L NC, Wean as tolerated  Continue to monitor RR, breathing pattern, pulse ox, and ABG's.  Encourage incentive spirometry.   ====================CARDIOVASCULAR==================  CAD s/p CABG x3 on 9/9/2021  Continue with ASA/Lipitor/Clopidogrel for graft patency.   Continue with IV Levophed  for pressor support, wean for a map of >65  Invasive hemodynamic monitoring, assess perfusion indices.   Back up pacing wires in place.    norepinephrine Infusion 0.08 MICROgram(s)/kG/Min (10.2 mL/Hr) IV Continuous <Continuous>  atorvastatin 80 milliGRAM(s) Oral at bedtime  aspirin enteric coated 81 milliGRAM(s) Oral daily  clopidogrel Tablet 75 milliGRAM(s) Oral daily  ===================HEMATOLOGIC/ONC ===================  Monitor H&H/Plts    Monitor chest tube outputs   ===================== RENAL =========================  Metabolic acidosis  Continue to monitor I/Os, BUN/Creatinine, and urine output  Replete lytes PRN. Keep K> 4 and Mg >2   ==================== GASTROINTESTINAL===================  Tolerating PO consistent carb diet.   Continue Protonix for stress ulcer prophylaxis.  Bowel regimen with Miralax.     pantoprazole    Tablet 40 milliGRAM(s) Oral before breakfast  polyethylene glycol 3350 17 Gram(s) Oral daily  potassium chloride  10 mEq/50 mL IVPB 10 milliEquivalent(s) IV Intermittent every 1 hour  potassium chloride  10 mEq/50 mL IVPB 10 milliEquivalent(s) IV Intermittent every 1 hour  potassium chloride  10 mEq/50 mL IVPB 10 milliEquivalent(s) IV Intermittent every 1 hour  sodium chloride 0.9%. 1000 milliLiter(s) (10 mL/Hr) IV Continuous <Continuous>  =======================    ENDOCRINE  =====================  Stress Hyperglycemia, requiring glucose control with insulin gtt, titrate as per insulin drip protocol along with hourly glucose checks.     dextrose 50% Injectable 50 milliLiter(s) IV Push every 15 minutes  dextrose 50% Injectable 25 milliLiter(s) IV Push every 15 minutes  insulin regular Infusion 3 Unit(s)/Hr (3 mL/Hr) IV Continuous <Continuous>  ========================INFECTIOUS DISEASE================  Afebrile, WBC within normal limits.   Monitor temperature and trend WBC.   Continue Cefuroxime for postoperative antibiotic coverage.    cefuroxime  IVPB 1500 milliGRAM(s) IV Intermittent every 8 hours      Patient requires continuous monitoring with bedside rhythm monitoring, pulse ox monitoring, and intermittent blood gas analysis. Care plan discussed with ICU care team. Patient remained critical and at risk for life threatening decompensation.     By signing my name below, ILuis Tiffany, attest that this documentation has been prepared under the direction and in the presence of EMY Coleman   Electronically signed: Renetta Smith Scribe, 09-10-21 @ 07:54    Raymond CARDONA PA , personally performed the services described in this documentation. all medical record entries made by the racnho were at my direction and in my presence. I have reviewed the chart and agree that the record reflects my personal performance and is accurate and complete  Electronically signed: EMY Coleman

## 2021-09-11 DIAGNOSIS — E11.65 TYPE 2 DIABETES MELLITUS WITH HYPERGLYCEMIA: ICD-10-CM

## 2021-09-11 LAB
ALBUMIN SERPL ELPH-MCNC: 4 G/DL — SIGNIFICANT CHANGE UP (ref 3.3–5)
ALP SERPL-CCNC: 38 U/L — LOW (ref 40–120)
ALT FLD-CCNC: 32 U/L — SIGNIFICANT CHANGE UP (ref 10–45)
ANION GAP SERPL CALC-SCNC: 11 MMOL/L — SIGNIFICANT CHANGE UP (ref 5–17)
AST SERPL-CCNC: 41 U/L — HIGH (ref 10–40)
BASOPHILS # BLD AUTO: 0.01 K/UL — SIGNIFICANT CHANGE UP (ref 0–0.2)
BASOPHILS NFR BLD AUTO: 0.1 % — SIGNIFICANT CHANGE UP (ref 0–2)
BILIRUB SERPL-MCNC: 1.4 MG/DL — HIGH (ref 0.2–1.2)
BUN SERPL-MCNC: 18 MG/DL — SIGNIFICANT CHANGE UP (ref 7–23)
CALCIUM SERPL-MCNC: 8.5 MG/DL — SIGNIFICANT CHANGE UP (ref 8.4–10.5)
CHLORIDE SERPL-SCNC: 103 MMOL/L — SIGNIFICANT CHANGE UP (ref 96–108)
CO2 SERPL-SCNC: 23 MMOL/L — SIGNIFICANT CHANGE UP (ref 22–31)
CREAT SERPL-MCNC: 1.18 MG/DL — SIGNIFICANT CHANGE UP (ref 0.5–1.3)
EOSINOPHIL # BLD AUTO: 0 K/UL — SIGNIFICANT CHANGE UP (ref 0–0.5)
EOSINOPHIL NFR BLD AUTO: 0 % — SIGNIFICANT CHANGE UP (ref 0–6)
GLUCOSE BLDC GLUCOMTR-MCNC: 100 MG/DL — HIGH (ref 70–99)
GLUCOSE BLDC GLUCOMTR-MCNC: 110 MG/DL — HIGH (ref 70–99)
GLUCOSE BLDC GLUCOMTR-MCNC: 112 MG/DL — HIGH (ref 70–99)
GLUCOSE BLDC GLUCOMTR-MCNC: 118 MG/DL — HIGH (ref 70–99)
GLUCOSE BLDC GLUCOMTR-MCNC: 120 MG/DL — HIGH (ref 70–99)
GLUCOSE BLDC GLUCOMTR-MCNC: 120 MG/DL — HIGH (ref 70–99)
GLUCOSE BLDC GLUCOMTR-MCNC: 124 MG/DL — HIGH (ref 70–99)
GLUCOSE BLDC GLUCOMTR-MCNC: 124 MG/DL — HIGH (ref 70–99)
GLUCOSE BLDC GLUCOMTR-MCNC: 132 MG/DL — HIGH (ref 70–99)
GLUCOSE BLDC GLUCOMTR-MCNC: 140 MG/DL — HIGH (ref 70–99)
GLUCOSE BLDC GLUCOMTR-MCNC: 142 MG/DL — HIGH (ref 70–99)
GLUCOSE BLDC GLUCOMTR-MCNC: 145 MG/DL — HIGH (ref 70–99)
GLUCOSE BLDC GLUCOMTR-MCNC: 149 MG/DL — HIGH (ref 70–99)
GLUCOSE BLDC GLUCOMTR-MCNC: 151 MG/DL — HIGH (ref 70–99)
GLUCOSE BLDC GLUCOMTR-MCNC: 152 MG/DL — HIGH (ref 70–99)
GLUCOSE BLDC GLUCOMTR-MCNC: 158 MG/DL — HIGH (ref 70–99)
GLUCOSE BLDC GLUCOMTR-MCNC: 159 MG/DL — HIGH (ref 70–99)
GLUCOSE BLDC GLUCOMTR-MCNC: 159 MG/DL — HIGH (ref 70–99)
GLUCOSE BLDC GLUCOMTR-MCNC: 164 MG/DL — HIGH (ref 70–99)
GLUCOSE BLDC GLUCOMTR-MCNC: 166 MG/DL — HIGH (ref 70–99)
GLUCOSE BLDC GLUCOMTR-MCNC: 170 MG/DL — HIGH (ref 70–99)
GLUCOSE BLDC GLUCOMTR-MCNC: 173 MG/DL — HIGH (ref 70–99)
GLUCOSE BLDC GLUCOMTR-MCNC: 71 MG/DL — SIGNIFICANT CHANGE UP (ref 70–99)
GLUCOSE BLDC GLUCOMTR-MCNC: 91 MG/DL — SIGNIFICANT CHANGE UP (ref 70–99)
GLUCOSE BLDC GLUCOMTR-MCNC: 93 MG/DL — SIGNIFICANT CHANGE UP (ref 70–99)
GLUCOSE BLDC GLUCOMTR-MCNC: 97 MG/DL — SIGNIFICANT CHANGE UP (ref 70–99)
GLUCOSE SERPL-MCNC: 152 MG/DL — HIGH (ref 70–99)
HCT VFR BLD CALC: 25.4 % — LOW (ref 39–50)
HGB BLD-MCNC: 8.1 G/DL — LOW (ref 13–17)
IMM GRANULOCYTES NFR BLD AUTO: 0.4 % — SIGNIFICANT CHANGE UP (ref 0–1.5)
LYMPHOCYTES # BLD AUTO: 12.6 % — LOW (ref 13–44)
LYMPHOCYTES # BLD AUTO: 2.03 K/UL — SIGNIFICANT CHANGE UP (ref 1–3.3)
MAGNESIUM SERPL-MCNC: 2.2 MG/DL — SIGNIFICANT CHANGE UP (ref 1.6–2.6)
MCHC RBC-ENTMCNC: 24.8 PG — LOW (ref 27–34)
MCHC RBC-ENTMCNC: 31.9 GM/DL — LOW (ref 32–36)
MCV RBC AUTO: 77.9 FL — LOW (ref 80–100)
MONOCYTES # BLD AUTO: 1.4 K/UL — HIGH (ref 0–0.9)
MONOCYTES NFR BLD AUTO: 8.7 % — SIGNIFICANT CHANGE UP (ref 2–14)
NEUTROPHILS # BLD AUTO: 12.62 K/UL — HIGH (ref 1.8–7.4)
NEUTROPHILS NFR BLD AUTO: 78.2 % — HIGH (ref 43–77)
NRBC # BLD: 0 /100 WBCS — SIGNIFICANT CHANGE UP (ref 0–0)
PHOSPHATE SERPL-MCNC: 3.3 MG/DL — SIGNIFICANT CHANGE UP (ref 2.5–4.5)
PLATELET # BLD AUTO: 155 K/UL — SIGNIFICANT CHANGE UP (ref 150–400)
POTASSIUM SERPL-MCNC: 4.9 MMOL/L — SIGNIFICANT CHANGE UP (ref 3.5–5.3)
POTASSIUM SERPL-SCNC: 4.9 MMOL/L — SIGNIFICANT CHANGE UP (ref 3.5–5.3)
PROT SERPL-MCNC: 5.8 G/DL — LOW (ref 6–8.3)
RBC # BLD: 3.26 M/UL — LOW (ref 4.2–5.8)
RBC # FLD: 14.9 % — HIGH (ref 10.3–14.5)
SODIUM SERPL-SCNC: 137 MMOL/L — SIGNIFICANT CHANGE UP (ref 135–145)
WBC # BLD: 16.12 K/UL — HIGH (ref 3.8–10.5)
WBC # FLD AUTO: 16.12 K/UL — HIGH (ref 3.8–10.5)

## 2021-09-11 PROCEDURE — 71045 X-RAY EXAM CHEST 1 VIEW: CPT | Mod: 26

## 2021-09-11 PROCEDURE — 93010 ELECTROCARDIOGRAM REPORT: CPT

## 2021-09-11 PROCEDURE — 99232 SBSQ HOSP IP/OBS MODERATE 35: CPT

## 2021-09-11 PROCEDURE — 99291 CRITICAL CARE FIRST HOUR: CPT | Mod: 24

## 2021-09-11 RX ORDER — HEPARIN SODIUM 5000 [USP'U]/ML
5000 INJECTION INTRAVENOUS; SUBCUTANEOUS EVERY 8 HOURS
Refills: 0 | Status: DISCONTINUED | OUTPATIENT
Start: 2021-09-11 | End: 2021-09-14

## 2021-09-11 RX ORDER — INSULIN LISPRO 100/ML
VIAL (ML) SUBCUTANEOUS AT BEDTIME
Refills: 0 | Status: DISCONTINUED | OUTPATIENT
Start: 2021-09-12 | End: 2021-09-13

## 2021-09-11 RX ORDER — INSULIN LISPRO 100/ML
5 VIAL (ML) SUBCUTANEOUS
Refills: 0 | Status: DISCONTINUED | OUTPATIENT
Start: 2021-09-12 | End: 2021-09-13

## 2021-09-11 RX ORDER — INSULIN LISPRO 100/ML
VIAL (ML) SUBCUTANEOUS
Refills: 0 | Status: DISCONTINUED | OUTPATIENT
Start: 2021-09-12 | End: 2021-09-13

## 2021-09-11 RX ORDER — METOPROLOL TARTRATE 50 MG
12.5 TABLET ORAL EVERY 12 HOURS
Refills: 0 | Status: DISCONTINUED | OUTPATIENT
Start: 2021-09-11 | End: 2021-09-12

## 2021-09-11 RX ORDER — INSULIN GLARGINE 100 [IU]/ML
15 INJECTION, SOLUTION SUBCUTANEOUS AT BEDTIME
Refills: 0 | Status: DISCONTINUED | OUTPATIENT
Start: 2021-09-11 | End: 2021-09-13

## 2021-09-11 RX ADMIN — Medication 10 MILLIGRAM(S): at 05:55

## 2021-09-11 RX ADMIN — HEPARIN SODIUM 5000 UNIT(S): 5000 INJECTION INTRAVENOUS; SUBCUTANEOUS at 22:40

## 2021-09-11 RX ADMIN — Medication 12.5 MILLIGRAM(S): at 17:05

## 2021-09-11 RX ADMIN — INSULIN HUMAN 3 UNIT(S)/HR: 100 INJECTION, SOLUTION SUBCUTANEOUS at 08:00

## 2021-09-11 RX ADMIN — HEPARIN SODIUM 5000 UNIT(S): 5000 INJECTION INTRAVENOUS; SUBCUTANEOUS at 13:20

## 2021-09-11 RX ADMIN — CHLORHEXIDINE GLUCONATE 1 APPLICATION(S): 213 SOLUTION TOPICAL at 07:03

## 2021-09-11 RX ADMIN — Medication 81 MILLIGRAM(S): at 11:24

## 2021-09-11 RX ADMIN — OXYCODONE AND ACETAMINOPHEN 2 TABLET(S): 5; 325 TABLET ORAL at 06:31

## 2021-09-11 RX ADMIN — INSULIN GLARGINE 15 UNIT(S): 100 INJECTION, SOLUTION SUBCUTANEOUS at 22:46

## 2021-09-11 RX ADMIN — OXYCODONE AND ACETAMINOPHEN 2 TABLET(S): 5; 325 TABLET ORAL at 07:01

## 2021-09-11 RX ADMIN — CLOPIDOGREL BISULFATE 75 MILLIGRAM(S): 75 TABLET, FILM COATED ORAL at 11:24

## 2021-09-11 RX ADMIN — Medication 10 MILLIGRAM(S): at 22:40

## 2021-09-11 RX ADMIN — PANTOPRAZOLE SODIUM 40 MILLIGRAM(S): 20 TABLET, DELAYED RELEASE ORAL at 05:55

## 2021-09-11 RX ADMIN — Medication 12.5 MILLIGRAM(S): at 09:54

## 2021-09-11 RX ADMIN — OXYCODONE AND ACETAMINOPHEN 2 TABLET(S): 5; 325 TABLET ORAL at 16:45

## 2021-09-11 RX ADMIN — ATORVASTATIN CALCIUM 80 MILLIGRAM(S): 80 TABLET, FILM COATED ORAL at 22:39

## 2021-09-11 RX ADMIN — SODIUM CHLORIDE 10 MILLILITER(S): 9 INJECTION INTRAMUSCULAR; INTRAVENOUS; SUBCUTANEOUS at 08:00

## 2021-09-11 RX ADMIN — Medication 10 MILLIGRAM(S): at 13:19

## 2021-09-11 RX ADMIN — OXYCODONE AND ACETAMINOPHEN 2 TABLET(S): 5; 325 TABLET ORAL at 17:00

## 2021-09-11 RX ADMIN — Medication 100 MILLIGRAM(S): at 05:55

## 2021-09-11 NOTE — PROGRESS NOTE ADULT - SUBJECTIVE AND OBJECTIVE BOX
Diabetes Follow up note:    Chief complaint: T2DM    Interval Hx: Pt remains on insulin gtt. Pt seen at bedside. Reports fair PO intake. Endorsing surgical incisional pain.     Review of Systems:  General: as above.   GI: Tolerating POs. Denies N/V/D/Abd pain  CV: Denies CP/SOB  ENDO: No S&Sx of hypoglycemia    MEDS:  atorvastatin 80 milliGRAM(s) Oral at bedtime    insulin regular Infusion 3 Unit(s)/Hr IV Continuous <Continuous>      Allergies    No Known Allergies    PE:  General: Male sitting in chair. NAD  Vital Signs Last 24 Hrs  T(C): 36.9 (11 Sep 2021 12:00), Max: 37.2 (11 Sep 2021 08:00)  T(F): 98.5 (11 Sep 2021 12:00), Max: 98.9 (11 Sep 2021 08:00)  HR: 79 (11 Sep 2021 14:00) (73 - 97)  BP: 96/59 (11 Sep 2021 14:00) (93/56 - 125/68)  BP(mean): 72 (11 Sep 2021 14:00) (68 - 92)  RR: 14 (11 Sep 2021 14:00) (11 - 23)  SpO2: 100% (11 Sep 2021 14:00) (94% - 100%)  Chest: Midsternal dsg c/d/i. Chest tubes in place.   Abd: Soft, NT,ND,   Extremities: Warm  Neuro: A&O X3    LABS:  POCT Blood Glucose.: 152 mg/dL (09-11-21 @ 14:00)  POCT Blood Glucose.: 170 mg/dL (09-11-21 @ 12:49)  POCT Blood Glucose.: 159 mg/dL (09-11-21 @ 11:53)  POCT Blood Glucose.: 151 mg/dL (09-11-21 @ 11:52)  POCT Blood Glucose.: 91 mg/dL (09-11-21 @ 11:50)  POCT Blood Glucose.: 145 mg/dL (09-11-21 @ 10:52)  POCT Blood Glucose.: 158 mg/dL (09-11-21 @ 10:03)  POCT Blood Glucose.: 142 mg/dL (09-11-21 @ 08:55)  POCT Blood Glucose.: 164 mg/dL (09-11-21 @ 08:10)  POCT Blood Glucose.: 132 mg/dL (09-11-21 @ 07:01)  POCT Blood Glucose.: 112 mg/dL (09-11-21 @ 06:01)  POCT Blood Glucose.: 71 mg/dL (09-11-21 @ 05:07)  POCT Blood Glucose.: 110 mg/dL (09-11-21 @ 04:08)  POCT Blood Glucose.: 140 mg/dL (09-11-21 @ 03:10)  POCT Blood Glucose.: 159 mg/dL (09-11-21 @ 02:02)  POCT Blood Glucose.: 149 mg/dL (09-11-21 @ 01:06)  POCT Blood Glucose.: 124 mg/dL (09-11-21 @ 00:19)  POCT Blood Glucose.: 140 mg/dL (09-10-21 @ 23:07)  POCT Blood Glucose.: 131 mg/dL (09-10-21 @ 21:57)  POCT Blood Glucose.: 110 mg/dL (09-10-21 @ 20:59)  POCT Blood Glucose.: 75 mg/dL (09-10-21 @ 19:49)  POCT Blood Glucose.: 101 mg/dL (09-10-21 @ 18:50)  POCT Blood Glucose.: 118 mg/dL (09-10-21 @ 17:59)  POCT Blood Glucose.: 126 mg/dL (09-10-21 @ 16:51)  POCT Blood Glucose.: 162 mg/dL (09-10-21 @ 16:03)  POCT Blood Glucose.: 203 mg/dL (09-10-21 @ 15:17)  POCT Blood Glucose.: 209 mg/dL (09-10-21 @ 13:51)  POCT Blood Glucose.: 160 mg/dL (09-10-21 @ 12:50)  POCT Blood Glucose.: 175 mg/dL (09-10-21 @ 12:18)  POCT Blood Glucose.: 193 mg/dL (09-10-21 @ 10:54)  POCT Blood Glucose.: 157 mg/dL (09-10-21 @ 09:48)  POCT Blood Glucose.: 138 mg/dL (09-10-21 @ 09:10)  POCT Blood Glucose.: 151 mg/dL (09-10-21 @ 08:18)  POCT Blood Glucose.: 133 mg/dL (09-10-21 @ 06:48)  POCT Blood Glucose.: 115 mg/dL (09-10-21 @ 05:58)  POCT Blood Glucose.: 96 mg/dL (09-10-21 @ 05:11)  POCT Blood Glucose.: 106 mg/dL (09-10-21 @ 04:01)  POCT Blood Glucose.: 105 mg/dL (09-10-21 @ 02:58)  POCT Blood Glucose.: 102 mg/dL (09-10-21 @ 02:05)  POCT Blood Glucose.: 113 mg/dL (09-10-21 @ 01:02)  POCT Blood Glucose.: 115 mg/dL (09-09-21 @ 23:53)  POCT Blood Glucose.: 128 mg/dL (09-09-21 @ 23:03)  POCT Blood Glucose.: 156 mg/dL (09-09-21 @ 22:05)  POCT Blood Glucose.: 163 mg/dL (09-09-21 @ 20:50)  POCT Blood Glucose.: 172 mg/dL (09-09-21 @ 19:57)  POCT Blood Glucose.: 174 mg/dL (09-09-21 @ 19:01)  POCT Blood Glucose.: 124 mg/dL (09-09-21 @ 12:25)  POCT Blood Glucose.: 147 mg/dL (09-09-21 @ 08:11)  POCT Blood Glucose.: 141 mg/dL (09-08-21 @ 21:29)  POCT Blood Glucose.: 90 mg/dL (09-08-21 @ 16:28)                            8.1    16.12 )-----------( 155      ( 11 Sep 2021 00:39 )             25.4       09-11    137  |  103  |  18  ----------------------------<  152<H>  4.9   |  23  |  1.18    Ca    8.5      11 Sep 2021 00:39  Phos  3.3     09-11  Mg     2.2     09-11    TPro  5.8<L>  /  Alb  4.0  /  TBili  1.4<H>  /  DBili  x   /  AST  41<H>  /  ALT  32  /  AlkPhos  38<L>  09-11      Thyroid Function Tests:  09-05 @ 10:39 TSH 6.00 FreeT4 -- T3 -- Anti TPO -- Anti Thyroglobulin Ab -- TSI --  09-03 @ 07:38 TSH 4.01 FreeT4 -- T3 -- Anti TPO -- Anti Thyroglobulin Ab -- TSI --      A1C with Estimated Average Glucose Result: 8.9 % (09-05-21 @ 08:41)  A1C with Estimated Average Glucose Result: 9.2 % (09-03-21 @ 07:38)  A1C with Estimated Average Glucose Result: 9.2 % (09-02-21 @ 10:13)          Contact number: kristi [phone] or [phone]

## 2021-09-11 NOTE — PROGRESS NOTE ADULT - SUBJECTIVE AND OBJECTIVE BOX
MAEVE JARVIS  MRN-[id number]  Patient is a 71y old  Male who presents with a chief complaint of CABG (09 Sep 2021 19:22)    HPI:  72 y/o male w/ no reported pmhx presents w/ 2 episodes of unprovoked chest pain since this morning found to have elevated cardiac enzymes with abnormal EKG, admitted to [facility] for NSTEMI. Had cardiac cath that showed 3v disease and is now planned for CABG eval at [facility].    (04 Sep 2021 21:07)      Surgery/Hospital Course:  9/9 C3L  9/10 Extubated     Today: Doing well, OOB to chair     REVIEW OF SYSTEMS:  Gen: No fever  EYES/ENT: No visual changes;  No vertigo or throat pain   NECK: No pain   RES:  No shortness of breath or Cough  CARD: + chest pain   GI: No abdominal pain  : No dysuria  NEURO: No weakness  SKIN: No itching, rashes       ICU Vital Signs Last 24 Hrs  T(C): 36.4 (10 Sep 2021 08:00), Max: 37 (10 Sep 2021 00:00)  T(F): 97.6 (10 Sep 2021 08:00), Max: 98.6 (10 Sep 2021 00:00)  HR: 82 (10 Sep 2021 07:30) (66 - 90)  BP: 132/78 (09 Sep 2021 10:20) (132/78 - 132/78)  BP(mean): 96 (09 Sep 2021 10:20) (96 - 96)  ABP: 131/46 (10 Sep 2021 07:30) (95/50 - 157/53)  ABP(mean): 66 (10 Sep 2021 07:30) (60 - 97)  RR: 10 (10 Sep 2021 07:30) (10 - 41)  SpO2: 100% (10 Sep 2021 07:30) (93% - 100%)      Physical Exam:  Gen:  Awake, alert, multiple lines and drains.   CNS: non focal 	  Neck: no JVD  RES : clear , no wheezing             Chest: +Chest tubes   CVS: Regular  rhythm. Normal S1/S2  Abd: Soft, non-distended. Bowel sounds present.  Skin: No rash.  Ext:  no edema    ============================I/O===========================   I&O's Detail    09 Sep 2021 07:01  -  10 Sep 2021 07:00  --------------------------------------------------------  IN:    Albumin 5%  - 250 mL: 1500 mL    Dexmedetomidine: 10.2 mL    Heparin: 30 mL    Insulin: 42 mL    IV PiggyBack: 600 mL    Lactated Ringers Bolus: 1000 mL    Norepinephrine: 87 mL    sodium chloride 0.9%: 140 mL  Total IN: 3409.2 mL    OUT:    Chest Tube (mL): 70 mL    Chest Tube (mL): 340 mL    Indwelling Catheter - Urethral (mL): 2460 mL    Ureteral Catheter (mL): 250 mL    Voided (mL): 400 mL  Total OUT: 3520 mL    Total NET: -110.8 mL        ============================ LABS =========================                        9.4    11.20 )-----------( 175      ( 10 Sep 2021 01:27 )             28.6     09-10    142  |  107  |  13  ----------------------------<  116<H>  4.1   |  22  |  1.06    Ca    8.5      10 Sep 2021 01:27  Phos  1.9     09-10  Mg     2.1     09-10    TPro  5.6<L>  /  Alb  3.8  /  TBili  1.9<H>  /  DBili  x   /  AST  34  /  ALT  26  /  AlkPhos  38<L>  09-10    LIVER FUNCTIONS - ( 10 Sep 2021 01:27 )  Alb: 3.8 g/dL / Pro: 5.6 g/dL / ALK PHOS: 38 U/L / ALT: 26 U/L / AST: 34 U/L / GGT: x           PT/INR - ( 09 Sep 2021 18:56 )   PT: 13.6 sec;   INR: 1.14 ratio         PTT - ( 09 Sep 2021 18:56 )  PTT:33.0 sec  ABG - ( 10 Sep 2021 06:50 )  pH, Arterial: 7.42  pH, Blood: x     /  pCO2: 34    /  pO2: 87    / HCO3: 22    / Base Excess: -2.0  /  SaO2: 98.7                ======================Micro/Rad/Cardio=================  CXR: Reviewed  ======================================================  PAST MEDICAL & SURGICAL HISTORY:  No pertinent past medical history    History of cataract surgery      ====================ASSESSMENT ==============  CAD s/p CABG x3 on 9/9/2021  Shock  Respiratory Failure  Stress Hyperglycemia   Hyponatremia  Metabolic Acidosis    Plan:  ====================== NEUROLOGY=====================  Nonfocal, continue to monitor neuro status per ICU protocol.   Percocet PRN for analgesia     oxycodone    5 mG/acetaminophen 325 mG 2 Tablet(s) Oral every 6 hours PRN Moderate Pain (4 - 6)  oxycodone    5 mG/acetaminophen 325 mG 1 Tablet(s) Oral every 4 hours PRN Mild Pain (1 - 3)  ==================== RESPIRATORY======================  Extubated; Receiving supplemental O2 therapy with 3L NC, Wean as tolerated  Continue to monitor RR, breathing pattern, pulse ox, and ABG's.  Encourage incentive spirometry.   ====================CARDIOVASCULAR==================  CAD s/p CABG x3 on 9/9/2021  Continue with ASA/Lipitor/Clopidogrel for graft patency.   Back up pacing wires in place.  atorvastatin 80 milliGRAM(s) Oral at bedtime  aspirin enteric coated 81 milliGRAM(s) Oral daily  clopidogrel Tablet 75 milliGRAM(s) Oral daily    Will start low dose BB as BP improved to 110.   ===================HEMATOLOGIC/ONC ===================  Monitor H&H/Plts      ===================== RENAL =========================  Metabolic acidosis  Continue to monitor I/Os, BUN/Creatinine, and urine output  Replete lytes PRN. Keep K> 4 and Mg >2   ==================== GASTROINTESTINAL===================  Tolerating PO consistent carb diet.   Continue Protonix for stress ulcer prophylaxis.  Bowel regimen with Miralax.     pantoprazole    Tablet 40 milliGRAM(s) Oral before breakfast  polyethylene glycol 3350 17 Gram(s) Oral daily  potassium chloride  10 mEq/50 mL IVPB 10 milliEquivalent(s) IV Intermittent every 1 hour  potassium chloride  10 mEq/50 mL IVPB 10 milliEquivalent(s) IV Intermittent every 1 hour  potassium chloride  10 mEq/50 mL IVPB 10 milliEquivalent(s) IV Intermittent every 1 hour  sodium chloride 0.9%. 1000 milliLiter(s) (10 mL/Hr) IV Continuous <Continuous>  =======================    ENDOCRINE  =====================  Stress Hyperglycemia, requiring glucose control with insulin gtt, titrate as per insulin drip protocol along with hourly glucose checks.     dextrose 50% Injectable 50 milliLiter(s) IV Push every 15 minutes  dextrose 50% Injectable 25 milliLiter(s) IV Push every 15 minutes  insulin regular Infusion 3 Unit(s)/Hr (3 mL/Hr) IV Continuous <Continuous>  ========================INFECTIOUS DISEASE================  Afebrile, WBC within normal limits.   Monitor temperature and trend WBC.   Continue Cefuroxime for postoperative antibiotic coverage.    cefuroxime  IVPB 1500 milliGRAM(s) IV Intermittent every 8 hours      Patient requires continuous monitoring with bedside rhythm monitoring, pulse ox monitoring, and intermittent blood gas analysis. Care plan discussed with ICU care team. Patient remained critical and at risk for life threatening decompensation.

## 2021-09-11 NOTE — PROGRESS NOTE ADULT - PROBLEM SELECTOR PLAN 1
-test BG hourly while on insulin gtt.   -Recommend give Lantus 15 units tonight at bedtime. Discontinue gtt 2 hours after administration.   -Start Admelog 5 units AC meals 9/12 once off gtt  -Admelog moderate correction scale AC and Mod HS scale 9/12  -cons carb diet  - for discharge: TBD. possible plan to dc on oral agents (Metformin +/- SGLT2 inhibitor). He requires glucometer teaching prior to dc.

## 2021-09-11 NOTE — PROGRESS NOTE ADULT - ASSESSMENT
71 year old man with no reported PMH (hasn't seen PCP in 6 yrs) presents with NSTEMI, found to have multi-vessel disease on cath and now s/p CABG on 9/9.  Patient found to have A1c of 9.2% new onset DM2.  On insulin gtt, POD#2 stable and off pressors/on diet. BG goal (100-150mg/dl).

## 2021-09-12 DIAGNOSIS — Z95.1 PRESENCE OF AORTOCORONARY BYPASS GRAFT: ICD-10-CM

## 2021-09-12 DIAGNOSIS — Z29.9 ENCOUNTER FOR PROPHYLACTIC MEASURES, UNSPECIFIED: ICD-10-CM

## 2021-09-12 LAB
ALBUMIN SERPL ELPH-MCNC: 4.2 G/DL — SIGNIFICANT CHANGE UP (ref 3.3–5)
ALP SERPL-CCNC: 50 U/L — SIGNIFICANT CHANGE UP (ref 40–120)
ALT FLD-CCNC: 37 U/L — SIGNIFICANT CHANGE UP (ref 10–45)
ANION GAP SERPL CALC-SCNC: 11 MMOL/L — SIGNIFICANT CHANGE UP (ref 5–17)
AST SERPL-CCNC: 32 U/L — SIGNIFICANT CHANGE UP (ref 10–40)
BILIRUB SERPL-MCNC: 1.2 MG/DL — SIGNIFICANT CHANGE UP (ref 0.2–1.2)
BUN SERPL-MCNC: 21 MG/DL — SIGNIFICANT CHANGE UP (ref 7–23)
CALCIUM SERPL-MCNC: 8.8 MG/DL — SIGNIFICANT CHANGE UP (ref 8.4–10.5)
CHLORIDE SERPL-SCNC: 103 MMOL/L — SIGNIFICANT CHANGE UP (ref 96–108)
CO2 SERPL-SCNC: 24 MMOL/L — SIGNIFICANT CHANGE UP (ref 22–31)
CREAT SERPL-MCNC: 1.15 MG/DL — SIGNIFICANT CHANGE UP (ref 0.5–1.3)
GLUCOSE BLDC GLUCOMTR-MCNC: 113 MG/DL — HIGH (ref 70–99)
GLUCOSE BLDC GLUCOMTR-MCNC: 119 MG/DL — HIGH (ref 70–99)
GLUCOSE BLDC GLUCOMTR-MCNC: 132 MG/DL — HIGH (ref 70–99)
GLUCOSE BLDC GLUCOMTR-MCNC: 144 MG/DL — HIGH (ref 70–99)
GLUCOSE BLDC GLUCOMTR-MCNC: 80 MG/DL — SIGNIFICANT CHANGE UP (ref 70–99)
GLUCOSE BLDC GLUCOMTR-MCNC: 98 MG/DL — SIGNIFICANT CHANGE UP (ref 70–99)
GLUCOSE SERPL-MCNC: 123 MG/DL — HIGH (ref 70–99)
HCT VFR BLD CALC: 31 % — LOW (ref 39–50)
HGB BLD-MCNC: 9.9 G/DL — LOW (ref 13–17)
MAGNESIUM SERPL-MCNC: 2.5 MG/DL — SIGNIFICANT CHANGE UP (ref 1.6–2.6)
MCHC RBC-ENTMCNC: 25.1 PG — LOW (ref 27–34)
MCHC RBC-ENTMCNC: 31.9 GM/DL — LOW (ref 32–36)
MCV RBC AUTO: 78.5 FL — LOW (ref 80–100)
NRBC # BLD: 0 /100 WBCS — SIGNIFICANT CHANGE UP (ref 0–0)
PHOSPHATE SERPL-MCNC: 2.4 MG/DL — LOW (ref 2.5–4.5)
PLATELET # BLD AUTO: 246 K/UL — SIGNIFICANT CHANGE UP (ref 150–400)
POTASSIUM SERPL-MCNC: 4.8 MMOL/L — SIGNIFICANT CHANGE UP (ref 3.5–5.3)
POTASSIUM SERPL-SCNC: 4.8 MMOL/L — SIGNIFICANT CHANGE UP (ref 3.5–5.3)
PROT SERPL-MCNC: 6.7 G/DL — SIGNIFICANT CHANGE UP (ref 6–8.3)
RBC # BLD: 3.95 M/UL — LOW (ref 4.2–5.8)
RBC # FLD: 15 % — HIGH (ref 10.3–14.5)
SODIUM SERPL-SCNC: 138 MMOL/L — SIGNIFICANT CHANGE UP (ref 135–145)
WBC # BLD: 17.03 K/UL — HIGH (ref 3.8–10.5)
WBC # FLD AUTO: 17.03 K/UL — HIGH (ref 3.8–10.5)

## 2021-09-12 PROCEDURE — 71045 X-RAY EXAM CHEST 1 VIEW: CPT | Mod: 26

## 2021-09-12 PROCEDURE — 99291 CRITICAL CARE FIRST HOUR: CPT

## 2021-09-12 PROCEDURE — 71045 X-RAY EXAM CHEST 1 VIEW: CPT | Mod: 26,77

## 2021-09-12 RX ORDER — METOPROLOL TARTRATE 50 MG
25 TABLET ORAL EVERY 8 HOURS
Refills: 0 | Status: DISCONTINUED | OUTPATIENT
Start: 2021-09-12 | End: 2021-09-13

## 2021-09-12 RX ORDER — ACETAMINOPHEN 500 MG
1000 TABLET ORAL ONCE
Refills: 0 | Status: COMPLETED | OUTPATIENT
Start: 2021-09-12 | End: 2021-09-12

## 2021-09-12 RX ORDER — METOPROLOL TARTRATE 50 MG
12.5 TABLET ORAL ONCE
Refills: 0 | Status: COMPLETED | OUTPATIENT
Start: 2021-09-12 | End: 2021-09-12

## 2021-09-12 RX ORDER — SODIUM CHLORIDE 9 MG/ML
3 INJECTION INTRAMUSCULAR; INTRAVENOUS; SUBCUTANEOUS EVERY 8 HOURS
Refills: 0 | Status: DISCONTINUED | OUTPATIENT
Start: 2021-09-12 | End: 2021-09-14

## 2021-09-12 RX ADMIN — OXYCODONE AND ACETAMINOPHEN 2 TABLET(S): 5; 325 TABLET ORAL at 05:54

## 2021-09-12 RX ADMIN — Medication 12.5 MILLIGRAM(S): at 05:55

## 2021-09-12 RX ADMIN — CHLORHEXIDINE GLUCONATE 1 APPLICATION(S): 213 SOLUTION TOPICAL at 05:54

## 2021-09-12 RX ADMIN — Medication 5 UNIT(S): at 11:30

## 2021-09-12 RX ADMIN — Medication 25 MILLIGRAM(S): at 14:20

## 2021-09-12 RX ADMIN — OXYCODONE AND ACETAMINOPHEN 2 TABLET(S): 5; 325 TABLET ORAL at 06:20

## 2021-09-12 RX ADMIN — SODIUM CHLORIDE 3 MILLILITER(S): 9 INJECTION INTRAMUSCULAR; INTRAVENOUS; SUBCUTANEOUS at 21:59

## 2021-09-12 RX ADMIN — Medication 81 MILLIGRAM(S): at 11:30

## 2021-09-12 RX ADMIN — ATORVASTATIN CALCIUM 80 MILLIGRAM(S): 80 TABLET, FILM COATED ORAL at 22:06

## 2021-09-12 RX ADMIN — HEPARIN SODIUM 5000 UNIT(S): 5000 INJECTION INTRAVENOUS; SUBCUTANEOUS at 14:19

## 2021-09-12 RX ADMIN — Medication 5 UNIT(S): at 16:29

## 2021-09-12 RX ADMIN — CLOPIDOGREL BISULFATE 75 MILLIGRAM(S): 75 TABLET, FILM COATED ORAL at 11:29

## 2021-09-12 RX ADMIN — HEPARIN SODIUM 5000 UNIT(S): 5000 INJECTION INTRAVENOUS; SUBCUTANEOUS at 05:53

## 2021-09-12 RX ADMIN — Medication 1000 MILLIGRAM(S): at 11:30

## 2021-09-12 RX ADMIN — Medication 5 UNIT(S): at 07:25

## 2021-09-12 RX ADMIN — PANTOPRAZOLE SODIUM 40 MILLIGRAM(S): 20 TABLET, DELAYED RELEASE ORAL at 05:53

## 2021-09-12 RX ADMIN — HEPARIN SODIUM 5000 UNIT(S): 5000 INJECTION INTRAVENOUS; SUBCUTANEOUS at 22:05

## 2021-09-12 RX ADMIN — Medication 25 MILLIGRAM(S): at 22:45

## 2021-09-12 RX ADMIN — Medication 12.5 MILLIGRAM(S): at 10:42

## 2021-09-12 RX ADMIN — INSULIN GLARGINE 15 UNIT(S): 100 INJECTION, SOLUTION SUBCUTANEOUS at 22:06

## 2021-09-12 RX ADMIN — Medication 400 MILLIGRAM(S): at 11:15

## 2021-09-12 NOTE — PROGRESS NOTE ADULT - SUBJECTIVE AND OBJECTIVE BOX
MAEVE JARVIS  MRN-[id number]  Patient is a 71y old  Male who presents with a chief complaint of CABG (11 Sep 2021 14:24)    HPI:  70 y/o male w/ no reported pmhx presents w/ 2 episodes of unprovoked chest pain since this morning found to have elevated cardiac enzymes with abnormal EKG, admitted to [facility] for NSTEMI. Had cardiac cath that showed 3v disease and is now planned for CABG eval at [facility].    (04 Sep 2021 21:07)      Surgery/Hospital Course:  9/9 C3L  9/10 Extubated     Today:    REVIEW OF SYSTEMS:  Gen: No fever  EYES/ENT: No visual changes;  No vertigo or throat pain   NECK: No pain   RES:  No shortness of breath or Cough  CARD: +chest pain   GI: No abdominal pain  : No dysuria  NEURO: No weakness  SKIN: No itching, rashes     ICU Vital Signs Last 24 Hrs  T(C): 36.9 (12 Sep 2021 08:00), Max: 37.2 (11 Sep 2021 16:00)  T(F): 98.5 (12 Sep 2021 08:00), Max: 98.9 (11 Sep 2021 16:00)  HR: 86 (12 Sep 2021 09:00) (79 - 98)  BP: 115/63 (12 Sep 2021 09:00) (96/59 - 147/75)  BP(mean): 83 (12 Sep 2021 09:00) (72 - 104)  ABP: --  ABP(mean): --  RR: 15 (12 Sep 2021 09:00) (12 - 29)  SpO2: 100% (12 Sep 2021 09:00) (95% - 100%)      Physical Exam:  Gen:  Awake, alert, multiple lines and drains.   CNS: non focal 	  Neck: no JVD  RES : clear , no wheezing             Chest: +Chest tube  CVS: Regular  rhythm. Normal S1/S2  Abd: Soft, non-distended. Bowel sounds present.  Skin: No rash    ============================I/O===========================   I&O's Detail    11 Sep 2021 07:01  -  12 Sep 2021 07:00  --------------------------------------------------------  IN:    Insulin: 34 mL    Oral Fluid: 1230 mL    sodium chloride 0.9%: 80 mL  Total IN: 1344 mL    OUT:    Chest Tube (mL): 100 mL    Indwelling Catheter - Urethral (mL): 365 mL    Norepinephrine: 0 mL    Voided (mL): 1925 mL  Total OUT: 2390 mL    Total NET: -1046 mL      12 Sep 2021 07:01  -  12 Sep 2021 09:51  --------------------------------------------------------  IN:    Oral Fluid: 240 mL  Total IN: 240 mL    OUT:    Insulin: 0 mL    sodium chloride 0.9%: 0 mL  Total OUT: 0 mL    Total NET: 240 mL        ============================ LABS =========================                        9.9    17.03 )-----------( 246      ( 12 Sep 2021 00:52 )             31.0     09-12    138  |  103  |  21  ----------------------------<  123<H>  4.8   |  24  |  1.15    Ca    8.8      12 Sep 2021 00:52  Phos  2.4     09-12  Mg     2.5     09-12    TPro  6.7  /  Alb  4.2  /  TBili  1.2  /  DBili  x   /  AST  32  /  ALT  37  /  AlkPhos  50  09-12    LIVER FUNCTIONS - ( 12 Sep 2021 00:52 )  Alb: 4.2 g/dL / Pro: 6.7 g/dL / ALK PHOS: 50 U/L / ALT: 37 U/L / AST: 32 U/L / GGT: x                 ======================Micro/Rad/Cardio=================  CXR: Reviewed  Echo:Reviewed  ======================================================  PAST MEDICAL & SURGICAL HISTORY:  No pertinent past medical history    History of cataract surgery      ====================ASSESSMENT ==============  CAD s/p CABG x3 on 9/9/2021  Shock  Respiratory Failure  Stress Hyperglycemia   Metabolic Acidosis    Plan:  ====================== NEUROLOGY=====================  Continue close monitoring of neuro status   Percocet PRN for analgesia     oxycodone    5 mG/acetaminophen 325 mG 1 Tablet(s) Oral every 4 hours PRN Mild Pain (1 - 3)  oxycodone    5 mG/acetaminophen 325 mG 2 Tablet(s) Oral every 6 hours PRN Moderate Pain (4 - 6)    ==================== RESPIRATORY======================  Supplemental O2 via 6L NC  Encourage incentive spirometry, continue pulse ox monitoring, follow ABGs     ====================CARDIOVASCULAR==================  CAD s/o C3L on 9/9   Continue invasive hemodynamic monitoring   Rate control with Lopressor   DAPT/Lipitor for graft patency   VVI paced at 40 via temp epicardial wires     aspirin enteric coated 81 milliGRAM(s) Oral daily  atorvastatin 80 milliGRAM(s) Oral at bedtime  clopidogrel Tablet 75 milliGRAM(s) Oral daily  metoprolol tartrate 12.5 milliGRAM(s) Oral every 12 hours    ===================HEMATOLOGIC/ONC ===================  Monitor H&H/Plts      VTE prophylaxis, heparin   Injectable 5000 Unit(s) SubCutaneous every 8 hours    ===================== RENAL =========================  Metabolic acidosis   Continue monitoring urine output, I&OS, BUN/Cr   Replete lytes PRN. Keep K> 4 and Mg >2    ==================== GASTROINTESTINAL===================  Tolerating consistent carb diet   Bowel regimen with Miralax     GI prophylaxis, pantoprazole    Tablet 40 milliGRAM(s) Oral before breakfast  polyethylene glycol 3350 17 Gram(s) Oral daily  potassium chloride  10 mEq/50 mL IVPB 10 milliEquivalent(s) IV Intermittent every 1 hour  sodium chloride 0.9%. 1000 milliLiter(s) (10 mL/Hr) IV Continuous <Continuous>    =======================    ENDOCRINE  =====================  Stress hyperglycemia, continue glucose control with IV insulin drip, admelog sliding scale, and lantus     dextrose 50% Injectable 50 milliLiter(s) IV Push every 15 minutes  dextrose 50% Injectable 25 milliLiter(s) IV Push every 15 minutes  insulin glargine Injectable (LANTUS) 15 Unit(s) SubCutaneous at bedtime  insulin lispro (ADMELOG) corrective regimen sliding scale   SubCutaneous three times a day before meals  insulin lispro (ADMELOG) corrective regimen sliding scale   SubCutaneous at bedtime  insulin lispro Injectable (ADMELOG) 5 Unit(s) SubCutaneous three times a day before meals  insulin regular Infusion 3 Unit(s)/Hr (3 mL/Hr) IV Continuous <Continuous>    ========================INFECTIOUS DISEASE================  Afebrile, WBC rising 16.12->17.03   Continue trending WBC and monitoring fever curve           Patient requires continuous monitoring with bedside rhythm monitoring, pulse ox monitoring, and intermittent blood gas analysis. Care plan discussed with ICU care team. Patient remained critical and at risk for life threatening decompensation.     By signing my name below, I, Alia Monge, attest that this documentation has been prepared under the direction and in the presence of Oumou Reagan NP   Electronically signed: Rancho Thomason, 09-12-21 @ 09:51    I, Oumou Reagan, personally performed the services described in this documentation. all medical record entries made by the rancho were at my direction and in my presence. I have reviewed the chart and agree that the record reflects my personal performance and is accurate and complete  Electronically signed: Oumou Reagan NP        MAEVE JARVIS  MRN-[id number]  Patient is a 71y old  Male who presents with a chief complaint of CABG (11 Sep 2021 14:24)    HPI:  70 y/o male w/ no reported pmhx presents w/ 2 episodes of unprovoked chest pain since this morning found to have elevated cardiac enzymes with abnormal EKG, admitted to [facility] for NSTEMI. Had cardiac cath that showed 3v disease and is now planned for CABG eval at [facility].    (04 Sep 2021 21:07)      Surgery/Hospital Course:  9/9 C3L  9/10 Extubated     Today: Pt de-intensified, increased beta blockers for afib prophylaxis     REVIEW OF SYSTEMS:  Gen: No fever  EYES/ENT: No visual changes;  No vertigo or throat pain   NECK: No pain   RES:  No shortness of breath or Cough  CARD: +chest pain   GI: No abdominal pain  : No dysuria  NEURO: No weakness  SKIN: No itching, rashes     ICU Vital Signs Last 24 Hrs  T(C): 36.9 (12 Sep 2021 08:00), Max: 37.2 (11 Sep 2021 16:00)  T(F): 98.5 (12 Sep 2021 08:00), Max: 98.9 (11 Sep 2021 16:00)  HR: 86 (12 Sep 2021 09:00) (79 - 98)  BP: 115/63 (12 Sep 2021 09:00) (96/59 - 147/75)  BP(mean): 83 (12 Sep 2021 09:00) (72 - 104)  ABP: --  ABP(mean): --  RR: 15 (12 Sep 2021 09:00) (12 - 29)  SpO2: 100% (12 Sep 2021 09:00) (95% - 100%)      Physical Exam:  Gen:  Awake, alert  CNS: non focal 	  Neck: no JVD  RES : clear , no wheezing             CVS: Regular  rhythm. Normal S1/S2  Abd: Soft, non-distended. Bowel sounds present.  Skin: No rash    ============================I/O===========================   I&O's Detail    11 Sep 2021 07:01  -  12 Sep 2021 07:00  --------------------------------------------------------  IN:    Insulin: 34 mL    Oral Fluid: 1230 mL    sodium chloride 0.9%: 80 mL  Total IN: 1344 mL    OUT:    Chest Tube (mL): 100 mL    Indwelling Catheter - Urethral (mL): 365 mL    Norepinephrine: 0 mL    Voided (mL): 1925 mL  Total OUT: 2390 mL    Total NET: -1046 mL      12 Sep 2021 07:01  -  12 Sep 2021 09:51  --------------------------------------------------------  IN:    Oral Fluid: 240 mL  Total IN: 240 mL    OUT:    Insulin: 0 mL    sodium chloride 0.9%: 0 mL  Total OUT: 0 mL    Total NET: 240 mL        ============================ LABS =========================                        9.9    17.03 )-----------( 246      ( 12 Sep 2021 00:52 )             31.0     09-12    138  |  103  |  21  ----------------------------<  123<H>  4.8   |  24  |  1.15    Ca    8.8      12 Sep 2021 00:52  Phos  2.4     09-12  Mg     2.5     09-12    TPro  6.7  /  Alb  4.2  /  TBili  1.2  /  DBili  x   /  AST  32  /  ALT  37  /  AlkPhos  50  09-12    LIVER FUNCTIONS - ( 12 Sep 2021 00:52 )  Alb: 4.2 g/dL / Pro: 6.7 g/dL / ALK PHOS: 50 U/L / ALT: 37 U/L / AST: 32 U/L / GGT: x                 ======================Micro/Rad/Cardio=================  CXR: Reviewed  Echo:Reviewed  ======================================================  PAST MEDICAL & SURGICAL HISTORY:  No pertinent past medical history    History of cataract surgery      ====================ASSESSMENT ==============  CAD s/p CABG x3 on 9/9/2021  Shock  Respiratory Failure  Stress Hyperglycemia   Metabolic Acidosis    Plan:  ====================== NEUROLOGY=====================  Continue close monitoring of neuro status   Percocet PRN for analgesia     oxycodone    5 mG/acetaminophen 325 mG 1 Tablet(s) Oral every 4 hours PRN Mild Pain (1 - 3)  oxycodone    5 mG/acetaminophen 325 mG 2 Tablet(s) Oral every 6 hours PRN Moderate Pain (4 - 6)    ==================== RESPIRATORY======================  Supplemental O2 via 6L NC, titrate down as tolerated.   Encourage incentive spirometry, continue pulse ox monitoring, follow ABGs     ====================CARDIOVASCULAR==================  CAD s/o C3L on 9/9   Rate control with Lopressor   DAPT/Lipitor for graft patency   VVI paced at 40 via temp epicardial wires     aspirin enteric coated 81 milliGRAM(s) Oral daily  atorvastatin 80 milliGRAM(s) Oral at bedtime  clopidogrel Tablet 75 milliGRAM(s) Oral daily  metoprolol tartrate 12.5 milliGRAM(s) Oral every 12 hours    ===================HEMATOLOGIC/ONC ===================  Monitor H&H/Plts      VTE prophylaxis, heparin   Injectable 5000 Unit(s) SubCutaneous every 8 hours    ===================== RENAL =========================  Metabolic acidosis   Continue monitoring urine output, I&OS, BUN/Cr   Replete lytes PRN. Keep K> 4 and Mg >2    ==================== GASTROINTESTINAL===================  Tolerating consistent carb diet   Bowel regimen with Miralax     GI prophylaxis, pantoprazole    Tablet 40 milliGRAM(s) Oral before breakfast  polyethylene glycol 3350 17 Gram(s) Oral daily  potassium chloride  10 mEq/50 mL IVPB 10 milliEquivalent(s) IV Intermittent every 1 hour  sodium chloride 0.9%. 1000 milliLiter(s) (10 mL/Hr) IV Continuous <Continuous>    =======================    ENDOCRINE  =====================  Stress hyperglycemia, continue glucose control with IV insulin drip, admelog sliding scale, and lantus     dextrose 50% Injectable 50 milliLiter(s) IV Push every 15 minutes  dextrose 50% Injectable 25 milliLiter(s) IV Push every 15 minutes  insulin glargine Injectable (LANTUS) 15 Unit(s) SubCutaneous at bedtime  insulin lispro (ADMELOG) corrective regimen sliding scale   SubCutaneous three times a day before meals  insulin lispro (ADMELOG) corrective regimen sliding scale   SubCutaneous at bedtime  insulin lispro Injectable (ADMELOG) 5 Unit(s) SubCutaneous three times a day before meals  insulin regular Infusion 3 Unit(s)/Hr (3 mL/Hr) IV Continuous <Continuous>    ========================INFECTIOUS DISEASE================  Afebrile, WBC rising 16.12->17.03   Continue trending WBC and monitoring fever curve           Patient requires continuous monitoring with bedside rhythm monitoring, pulse ox monitoring, and intermittent blood gas analysis. Care plan discussed with ICU care team. Patient remained critical and at risk for life threatening decompensation.     By signing my name below, I, Alia Monge, attest that this documentation has been prepared under the direction and in the presence of Oumou Reagan NP   Electronically signed: Rancho Thomason, 09-12-21 @ 09:51    I, Oumou Reagan, personally performed the services described in this documentation. all medical record entries made by the rancho were at my direction and in my presence. I have reviewed the chart and agree that the record reflects my personal performance and is accurate and complete  Electronically signed: Oumou Reagan NP

## 2021-09-12 NOTE — PROGRESS NOTE ADULT - ASSESSMENT
This is a  71 yr old male admitted from [facility]  after cardiac revealed CAD;  patient with HgA1C of 9%, pt unaware he was a diabetic and no other significant history.  Originally from [place].  Lived in this country x over [place] years.  Still active, works full-time.     On 9/9/21, pt underwent CABG x 3 w/ LIMA ef nl  postop course:    pod # 3 tr fl  d/c plan home      This is a  71 yr old male admitted from [facility]  after cardiac revealed CAD;  patient with HgA1C of 9%, pt unaware he was a diabetic and no other significant history.  Originally from [place].  Lived in this country x over 30 years.  Still active, works full-time.     On 9/9/21, pt underwent CABG x 3 w/ LIMA ef nl  postop course:  BB/STATIN/DVT PROPHY.  CT'S OUT  pod # 3 tr fl  d/c plan home      This is a  71 yr old male admitted from [facility]  after cardiac revealed CAD;  patient with HgA1C of 9%, pt unaware he was a diabetic and no other significant history.  Originally from [place].  Lived in this country x over 30 years.  Still active, works full-time.     On 9/9/21, pt underwent CABG x 3 w/ LIMA ef nl  postop course:  BB/STATIN/DVT PROPHY.  CT'S OUT  +bm this am   pod # 3 tr fl  d/c plan home

## 2021-09-12 NOTE — PROGRESS NOTE ADULT - SUBJECTIVE AND OBJECTIVE BOX
VITAL SIGNS-Telemetry:  SR 81  Vital Signs Last 24 Hrs  T(C): 36.9 (21 @ 16:22), Max: 37.2 (21 @ 16:00)  T(F): 98.4 (21 @ 16:22), Max: 98.9 (21 @ 16:00)  HR: 80 (21 @ 16:22) (75 - 100)  BP: 103/67 (21 @ 16:22) (96/55 - 147/75)  RR: 18 (21 @ 16:22) (12 - 29)  SpO2: 99% (21 @ 16:22) (97% - 100%)          @ 07:01  -   @ 07:00  --------------------------------------------------------  IN: 1344 mL / OUT: 2390 mL / NET: -1046 mL     @ 07:01  -   @ 16:39  --------------------------------------------------------  IN: 580 mL / OUT: 300 mL / NET: 280 mL    Daily     Daily Weight in k.3 (12 Sep 2021 06:00)    CAPILLARY BLOOD GLUCOSE  144 (12 Sep 2021 07:00)  123 (12 Sep 2021 01:00)  119 (12 Sep 2021 00:00)  120 (11 Sep 2021 23:00)        Pacing Wires        [  ]   Settings:                                  Isolated  [ x ]    PHYSICAL EXAM:  Neurology: alert and oriented x 3, nonfocal, no gross deficits  CV : S1S2  Sternal Wound :  CDI , Stable  Lungs: cta  Abdomen: soft, nontender, nondistended, positive bowel sounds, last bowel movement       preop  Extremities:   no c/c/e      aspirin enteric coated 81 milliGRAM(s) Oral daily  atorvastatin 80 milliGRAM(s) Oral at bedtime  clopidogrel Tablet 75 milliGRAM(s) Oral daily  dextrose 50% Injectable 50 milliLiter(s) IV Push every 15 minutes  dextrose 50% Injectable 25 milliLiter(s) IV Push every 15 minutes  heparin   Injectable 5000 Unit(s) SubCutaneous every 8 hours  insulin glargine Injectable (LANTUS) 15 Unit(s) SubCutaneous at bedtime  insulin lispro (ADMELOG) corrective regimen sliding scale   SubCutaneous three times a day before meals  insulin lispro (ADMELOG) corrective regimen sliding scale   SubCutaneous at bedtime  insulin lispro Injectable (ADMELOG) 5 Unit(s) SubCutaneous three times a day before meals  metoprolol tartrate 25 milliGRAM(s) Oral every 8 hours  oxycodone    5 mG/acetaminophen 325 mG 1 Tablet(s) Oral every 4 hours PRN  oxycodone    5 mG/acetaminophen 325 mG 2 Tablet(s) Oral every 6 hours PRN  pantoprazole    Tablet 40 milliGRAM(s) Oral before breakfast  polyethylene glycol 3350 17 Gram(s) Oral daily  sodium chloride 0.9% lock flush 3 milliLiter(s) IV Push every 8 hours      Physical Therapy Rec:   Home  [ x ]   Home w/ PT  [  ]  Rehab  [  ]  Discussed with Cardiothoracic Team at AM rounds. VITAL SIGNS-Telemetry:  SR 81  Vital Signs Last 24 Hrs  T(C): 36.9 (21 @ 16:22), Max: 37.2 (21 @ 16:00)  T(F): 98.4 (21 @ 16:22), Max: 98.9 (21 @ 16:00)  HR: 80 (21 @ 16:22) (75 - 100)  BP: 103/67 (21 @ 16:22) (96/55 - 147/75)  RR: 18 (21 @ 16:22) (12 - 29)  SpO2: 99% (21 @ 16:22) (97% - 100%)          @ 07:01  -   @ 07:00  --------------------------------------------------------  IN: 1344 mL / OUT: 2390 mL / NET: -1046 mL     @ 07:01  -   @ 16:39  --------------------------------------------------------  IN: 580 mL / OUT: 300 mL / NET: 280 mL    Daily     Daily Weight in k.3 (12 Sep 2021 06:00)    CAPILLARY BLOOD GLUCOSE  144 (12 Sep 2021 07:00)  123 (12 Sep 2021 01:00)  119 (12 Sep 2021 00:00)  120 (11 Sep 2021 23:00)        Pacing Wires        [  ]   Settings:                                  Isolated  [ x ]    PHYSICAL EXAM:  Neurology: alert and oriented x 3, nonfocal, no gross deficits  CV : S1S2  Sternal Wound :  CDI , Stable  Lungs: cta  Abdomen: soft, nontender, nondistended, positive bowel sounds, last bowel movement     Extremities:   + edema b/l  LLE inc cdi w/ ace wrap in place no calf tenderness.    aspirin enteric coated 81 milliGRAM(s) Oral daily  atorvastatin 80 milliGRAM(s) Oral at bedtime  clopidogrel Tablet 75 milliGRAM(s) Oral daily  dextrose 50% Injectable 50 milliLiter(s) IV Push every 15 minutes  dextrose 50% Injectable 25 milliLiter(s) IV Push every 15 minutes  heparin   Injectable 5000 Unit(s) SubCutaneous every 8 hours  insulin glargine Injectable (LANTUS) 15 Unit(s) SubCutaneous at bedtime  insulin lispro (ADMELOG) corrective regimen sliding scale   SubCutaneous three times a day before meals  insulin lispro (ADMELOG) corrective regimen sliding scale   SubCutaneous at bedtime  insulin lispro Injectable (ADMELOG) 5 Unit(s) SubCutaneous three times a day before meals  metoprolol tartrate 25 milliGRAM(s) Oral every 8 hours  oxycodone    5 mG/acetaminophen 325 mG 1 Tablet(s) Oral every 4 hours PRN  oxycodone    5 mG/acetaminophen 325 mG 2 Tablet(s) Oral every 6 hours PRN  pantoprazole    Tablet 40 milliGRAM(s) Oral before breakfast  polyethylene glycol 3350 17 Gram(s) Oral daily  sodium chloride 0.9% lock flush 3 milliLiter(s) IV Push every 8 hours      Physical Therapy Rec:   Home  [ x ]   Home w/ PT  [  ]  Rehab  [  ]  Discussed with Cardiothoracic Team at AM rounds.

## 2021-09-13 LAB
ALBUMIN SERPL ELPH-MCNC: 3.4 G/DL — SIGNIFICANT CHANGE UP (ref 3.3–5)
ALP SERPL-CCNC: 50 U/L — SIGNIFICANT CHANGE UP (ref 40–120)
ALT FLD-CCNC: 33 U/L — SIGNIFICANT CHANGE UP (ref 10–45)
ANION GAP SERPL CALC-SCNC: 9 MMOL/L — SIGNIFICANT CHANGE UP (ref 5–17)
AST SERPL-CCNC: 25 U/L — SIGNIFICANT CHANGE UP (ref 10–40)
BASOPHILS # BLD AUTO: 0.02 K/UL — SIGNIFICANT CHANGE UP (ref 0–0.2)
BASOPHILS NFR BLD AUTO: 0.2 % — SIGNIFICANT CHANGE UP (ref 0–2)
BILIRUB SERPL-MCNC: 1.1 MG/DL — SIGNIFICANT CHANGE UP (ref 0.2–1.2)
BUN SERPL-MCNC: 18 MG/DL — SIGNIFICANT CHANGE UP (ref 7–23)
CALCIUM SERPL-MCNC: 8.5 MG/DL — SIGNIFICANT CHANGE UP (ref 8.4–10.5)
CHLORIDE SERPL-SCNC: 101 MMOL/L — SIGNIFICANT CHANGE UP (ref 96–108)
CO2 SERPL-SCNC: 25 MMOL/L — SIGNIFICANT CHANGE UP (ref 22–31)
CREAT SERPL-MCNC: 1.2 MG/DL — SIGNIFICANT CHANGE UP (ref 0.5–1.3)
EOSINOPHIL # BLD AUTO: 0.12 K/UL — SIGNIFICANT CHANGE UP (ref 0–0.5)
EOSINOPHIL NFR BLD AUTO: 1 % — SIGNIFICANT CHANGE UP (ref 0–6)
GLUCOSE BLDC GLUCOMTR-MCNC: 111 MG/DL — HIGH (ref 70–99)
GLUCOSE BLDC GLUCOMTR-MCNC: 135 MG/DL — HIGH (ref 70–99)
GLUCOSE BLDC GLUCOMTR-MCNC: 187 MG/DL — HIGH (ref 70–99)
GLUCOSE BLDC GLUCOMTR-MCNC: 88 MG/DL — SIGNIFICANT CHANGE UP (ref 70–99)
GLUCOSE BLDC GLUCOMTR-MCNC: 91 MG/DL — SIGNIFICANT CHANGE UP (ref 70–99)
GLUCOSE SERPL-MCNC: 98 MG/DL — SIGNIFICANT CHANGE UP (ref 70–99)
HCT VFR BLD CALC: 28.3 % — LOW (ref 39–50)
HGB BLD-MCNC: 8.9 G/DL — LOW (ref 13–17)
IMM GRANULOCYTES NFR BLD AUTO: 0.8 % — SIGNIFICANT CHANGE UP (ref 0–1.5)
LYMPHOCYTES # BLD AUTO: 3.75 K/UL — HIGH (ref 1–3.3)
LYMPHOCYTES # BLD AUTO: 30.6 % — SIGNIFICANT CHANGE UP (ref 13–44)
MAGNESIUM SERPL-MCNC: 2.2 MG/DL — SIGNIFICANT CHANGE UP (ref 1.6–2.6)
MCHC RBC-ENTMCNC: 24.7 PG — LOW (ref 27–34)
MCHC RBC-ENTMCNC: 31.4 GM/DL — LOW (ref 32–36)
MCV RBC AUTO: 78.4 FL — LOW (ref 80–100)
MONOCYTES # BLD AUTO: 0.88 K/UL — SIGNIFICANT CHANGE UP (ref 0–0.9)
MONOCYTES NFR BLD AUTO: 7.2 % — SIGNIFICANT CHANGE UP (ref 2–14)
NEUTROPHILS # BLD AUTO: 7.39 K/UL — SIGNIFICANT CHANGE UP (ref 1.8–7.4)
NEUTROPHILS NFR BLD AUTO: 60.2 % — SIGNIFICANT CHANGE UP (ref 43–77)
NRBC # BLD: 0 /100 WBCS — SIGNIFICANT CHANGE UP (ref 0–0)
PHOSPHATE SERPL-MCNC: 1.8 MG/DL — LOW (ref 2.5–4.5)
PLATELET # BLD AUTO: 275 K/UL — SIGNIFICANT CHANGE UP (ref 150–400)
POTASSIUM SERPL-MCNC: 4.5 MMOL/L — SIGNIFICANT CHANGE UP (ref 3.5–5.3)
POTASSIUM SERPL-SCNC: 4.5 MMOL/L — SIGNIFICANT CHANGE UP (ref 3.5–5.3)
PROT SERPL-MCNC: 6 G/DL — SIGNIFICANT CHANGE UP (ref 6–8.3)
RBC # BLD: 3.61 M/UL — LOW (ref 4.2–5.8)
RBC # FLD: 14.8 % — HIGH (ref 10.3–14.5)
SODIUM SERPL-SCNC: 135 MMOL/L — SIGNIFICANT CHANGE UP (ref 135–145)
WBC # BLD: 12.26 K/UL — HIGH (ref 3.8–10.5)
WBC # FLD AUTO: 12.26 K/UL — HIGH (ref 3.8–10.5)

## 2021-09-13 PROCEDURE — 71045 X-RAY EXAM CHEST 1 VIEW: CPT | Mod: 26

## 2021-09-13 PROCEDURE — 99232 SBSQ HOSP IP/OBS MODERATE 35: CPT

## 2021-09-13 RX ORDER — INSULIN LISPRO 100/ML
VIAL (ML) SUBCUTANEOUS AT BEDTIME
Refills: 0 | Status: DISCONTINUED | OUTPATIENT
Start: 2021-09-13 | End: 2021-09-14

## 2021-09-13 RX ORDER — METOPROLOL TARTRATE 50 MG
25 TABLET ORAL
Refills: 0 | Status: DISCONTINUED | OUTPATIENT
Start: 2021-09-13 | End: 2021-09-14

## 2021-09-13 RX ORDER — INSULIN GLARGINE 100 [IU]/ML
12 INJECTION, SOLUTION SUBCUTANEOUS AT BEDTIME
Refills: 0 | Status: DISCONTINUED | OUTPATIENT
Start: 2021-09-13 | End: 2021-09-14

## 2021-09-13 RX ORDER — SODIUM CHLORIDE 9 MG/ML
500 INJECTION INTRAMUSCULAR; INTRAVENOUS; SUBCUTANEOUS ONCE
Refills: 0 | Status: COMPLETED | OUTPATIENT
Start: 2021-09-13 | End: 2021-09-13

## 2021-09-13 RX ORDER — INSULIN LISPRO 100/ML
VIAL (ML) SUBCUTANEOUS
Refills: 0 | Status: DISCONTINUED | OUTPATIENT
Start: 2021-09-13 | End: 2021-09-14

## 2021-09-13 RX ORDER — INSULIN LISPRO 100/ML
3 VIAL (ML) SUBCUTANEOUS
Refills: 0 | Status: DISCONTINUED | OUTPATIENT
Start: 2021-09-13 | End: 2021-09-14

## 2021-09-13 RX ADMIN — CLOPIDOGREL BISULFATE 75 MILLIGRAM(S): 75 TABLET, FILM COATED ORAL at 11:09

## 2021-09-13 RX ADMIN — HEPARIN SODIUM 5000 UNIT(S): 5000 INJECTION INTRAVENOUS; SUBCUTANEOUS at 21:54

## 2021-09-13 RX ADMIN — Medication 5 UNIT(S): at 17:05

## 2021-09-13 RX ADMIN — SODIUM CHLORIDE 3 MILLILITER(S): 9 INJECTION INTRAMUSCULAR; INTRAVENOUS; SUBCUTANEOUS at 05:40

## 2021-09-13 RX ADMIN — Medication 5 UNIT(S): at 11:53

## 2021-09-13 RX ADMIN — HEPARIN SODIUM 5000 UNIT(S): 5000 INJECTION INTRAVENOUS; SUBCUTANEOUS at 13:09

## 2021-09-13 RX ADMIN — Medication 5 UNIT(S): at 08:00

## 2021-09-13 RX ADMIN — Medication 25 MILLIGRAM(S): at 17:06

## 2021-09-13 RX ADMIN — ATORVASTATIN CALCIUM 80 MILLIGRAM(S): 80 TABLET, FILM COATED ORAL at 21:50

## 2021-09-13 RX ADMIN — INSULIN GLARGINE 12 UNIT(S): 100 INJECTION, SOLUTION SUBCUTANEOUS at 21:51

## 2021-09-13 RX ADMIN — SODIUM CHLORIDE 3 MILLILITER(S): 9 INJECTION INTRAMUSCULAR; INTRAVENOUS; SUBCUTANEOUS at 21:42

## 2021-09-13 RX ADMIN — PANTOPRAZOLE SODIUM 40 MILLIGRAM(S): 20 TABLET, DELAYED RELEASE ORAL at 05:54

## 2021-09-13 RX ADMIN — HEPARIN SODIUM 5000 UNIT(S): 5000 INJECTION INTRAVENOUS; SUBCUTANEOUS at 05:53

## 2021-09-13 RX ADMIN — SODIUM CHLORIDE 1500 MILLILITER(S): 9 INJECTION INTRAMUSCULAR; INTRAVENOUS; SUBCUTANEOUS at 10:11

## 2021-09-13 RX ADMIN — SODIUM CHLORIDE 3 MILLILITER(S): 9 INJECTION INTRAMUSCULAR; INTRAVENOUS; SUBCUTANEOUS at 13:09

## 2021-09-13 RX ADMIN — Medication 2: at 11:52

## 2021-09-13 RX ADMIN — Medication 81 MILLIGRAM(S): at 11:09

## 2021-09-13 RX ADMIN — POLYETHYLENE GLYCOL 3350 17 GRAM(S): 17 POWDER, FOR SOLUTION ORAL at 11:10

## 2021-09-13 NOTE — PROGRESS NOTE ADULT - NUTRITIONAL ASSESSMENT
This patient has been assessed with a concern for Malnutrition and has been determined to have a diagnosis/diagnoses of Moderate protein-calorie malnutrition.    This patient is being managed with:   Diet Regular-  Consistent Carbohydrate {No Snacks} (CSTCHO)  Entered: Sep 10 2021  7:43AM    
Diet, Regular:   Consistent Carbohydrate {No Snacks} (CSTCHO) (09-10-21 @ 07:43) [Active]
Diet, Regular:   Consistent Carbohydrate {No Snacks} (CSTCHO) (09-10-21 @ 07:43) [Active]

## 2021-09-13 NOTE — PROGRESS NOTE ADULT - SUBJECTIVE AND OBJECTIVE BOX
VITAL SIGNS-Telemetry:  SR 70-90  Vital Signs Last 24 Hrs  T(C): 37.3 (09-13-21 @ 05:00), Max: 37.3 (09-13-21 @ 05:00)  T(F): 99.1 (09-13-21 @ 05:00), Max: 99.1 (09-13-21 @ 05:00)  HR: 92 (09-13-21 @ 10:40) (75 - 100)  BP: 106/69 (09-13-21 @ 10:40) (87/57 - 114/74)  RR: 97 (09-13-21 @ 07:56) (12 - 97)  SpO2: 95% (09-13-21 @ 05:00) (95% - 100%)         09-12 @ 07:01  -  09-13 @ 07:00  --------------------------------------------------------  IN: 1020 mL / OUT: 1170 mL / NET: -150 mL    09-13 @ 07:01  -  09-13 @ 12:58  --------------------------------------------------------  IN: 500 mL / OUT: 300 mL / NET: 200 mL  Daily     Daily     CAPILLARY BLOOD GLUCOSE  POCT Blood Glucose.: 187 mg/dL (13 Sep 2021 11:48)  POCT Blood Glucose.: 111 mg/dL (13 Sep 2021 07:44)  POCT Blood Glucose.: 135 mg/dL (13 Sep 2021 02:07)    Pacing Wires        [  ]   Settings:                                  Isolated  [x  ]    PHYSICAL EXAM:  Neurology: alert and oriented x 3, nonfocal, no gross deficits  CV : S1S2  Sternal Wound :  CDI , Stable  Lungs: cta  Abdomen: soft, nontender, nondistended, positive bowel sounds, last bowel movement   +9/12      Extremities:     tr. edema LLE inc cdi w/ ace wrap, no calf tenderness    aspirin enteric coated 81 milliGRAM(s) Oral daily  atorvastatin 80 milliGRAM(s) Oral at bedtime  clopidogrel Tablet 75 milliGRAM(s) Oral daily  dextrose 50% Injectable 50 milliLiter(s) IV Push every 15 minutes  dextrose 50% Injectable 25 milliLiter(s) IV Push every 15 minutes  heparin   Injectable 5000 Unit(s) SubCutaneous every 8 hours  insulin glargine Injectable (LANTUS) 15 Unit(s) SubCutaneous at bedtime  insulin lispro (ADMELOG) corrective regimen sliding scale   SubCutaneous three times a day before meals  insulin lispro (ADMELOG) corrective regimen sliding scale   SubCutaneous at bedtime  insulin lispro Injectable (ADMELOG) 5 Unit(s) SubCutaneous three times a day before meals  metoprolol tartrate 25 milliGRAM(s) Oral two times a day  oxycodone    5 mG/acetaminophen 325 mG 1 Tablet(s) Oral every 4 hours PRN  oxycodone    5 mG/acetaminophen 325 mG 2 Tablet(s) Oral every 6 hours PRN  pantoprazole    Tablet 40 milliGRAM(s) Oral before breakfast  polyethylene glycol 3350 17 Gram(s) Oral daily  sodium chloride 0.9% lock flush 3 milliLiter(s) IV Push every 8 hours    Physical Therapy Rec:   Home  [ x ]   Home w/ PT  [  ]  Rehab  [  ]  Discussed with Cardiothoracic Team at AM rounds.

## 2021-09-13 NOTE — PROGRESS NOTE ADULT - PROBLEM SELECTOR PROBLEM 2
Diabetes mellitus
Hypertension, unspecified type
Hypertension, unspecified type
Diabetes mellitus
Hypertension, unspecified type
DVT prophylaxis
DVT prophylaxis
Hypertension, unspecified type

## 2021-09-13 NOTE — PROGRESS NOTE ADULT - PROVIDER SPECIALTY LIST ADULT
Critical Care
CT Surgery
Critical Care
Endocrinology
CT Surgery
Critical Care
Endocrinology
Endocrinology
CT Surgery
Endocrinology

## 2021-09-13 NOTE — PROGRESS NOTE ADULT - ASSESSMENT
71 year old man with no reported PMH (hasn't seen PCP in 6 yrs) presents with NSTEMI, found to have multi-vessel disease on cath and now s/p CABG on 9/9 and  found to have new onset T2D (HbA1c 9.2%).  Endocrine consulted for hyperglycemia and new-onset diabetes.

## 2021-09-13 NOTE — PROGRESS NOTE ADULT - PROBLEM SELECTOR PLAN 3
c/w timothy Huertas DO
c/w timothy Huertas DO
c/w statin    discussed w/pt and CT surgery team  pager: [phone]   office:  [phone] (M-F 9a-5pm)               [phone] (nights/weekends)
-continue atorvastatin 80mg po qhs  -outpatient endocrine f/u  Poornima Farah MD  Endocrinology Attending  Mondays and Tuesdays 9am-6pm: [phone] (pager)  Other days, night and weekend: [phone]

## 2021-09-13 NOTE — PROGRESS NOTE ADULT - SUBJECTIVE AND OBJECTIVE BOX
Chief Complaint/Follow-up on:     Subjective:    MEDICATIONS  (STANDING):  aspirin enteric coated 81 milliGRAM(s) Oral daily  atorvastatin 80 milliGRAM(s) Oral at bedtime  clopidogrel Tablet 75 milliGRAM(s) Oral daily  dextrose 50% Injectable 50 milliLiter(s) IV Push every 15 minutes  dextrose 50% Injectable 25 milliLiter(s) IV Push every 15 minutes  heparin   Injectable 5000 Unit(s) SubCutaneous every 8 hours  insulin glargine Injectable (LANTUS) 15 Unit(s) SubCutaneous at bedtime  insulin lispro (ADMELOG) corrective regimen sliding scale   SubCutaneous three times a day before meals  insulin lispro (ADMELOG) corrective regimen sliding scale   SubCutaneous at bedtime  insulin lispro Injectable (ADMELOG) 5 Unit(s) SubCutaneous three times a day before meals  metoprolol tartrate 25 milliGRAM(s) Oral two times a day  pantoprazole    Tablet 40 milliGRAM(s) Oral before breakfast  polyethylene glycol 3350 17 Gram(s) Oral daily  sodium chloride 0.9% lock flush 3 milliLiter(s) IV Push every 8 hours    MEDICATIONS  (PRN):  oxycodone    5 mG/acetaminophen 325 mG 1 Tablet(s) Oral every 4 hours PRN Mild Pain (1 - 3)  oxycodone    5 mG/acetaminophen 325 mG 2 Tablet(s) Oral every 6 hours PRN Moderate Pain (4 - 6)      PHYSICAL EXAM:  VITALS: T(C): 37.3 (09-13-21 @ 05:00)  T(F): 99.1 (09-13-21 @ 05:00), Max: 99.1 (09-13-21 @ 05:00)  HR: 92 (09-13-21 @ 10:40) (75 - 100)  BP: 106/69 (09-13-21 @ 10:40) (87/57 - 114/74)  RR:  (12 - 97)  SpO2:  (95% - 100%)  Wt(kg): --  GENERAL: NAD, well-groomed, well-developed  EYES: No proptosis, no injection  HEENT:  Atraumatic, Normocephalic, moist mucous membranes  THYROID: Normal size, no palpable nodules  RESPIRATORY: Clear to auscultation bilaterally; No rales, rhonchi, wheezing, or rubs  CARDIOVASCULAR: Regular rate and rhythm; No murmurs; no peripheral edema  GI: Soft, nontender, non distended, normal bowel sounds  CUSHING'S SIGNS: no striae    POCT Blood Glucose.: 187 mg/dL (09-13-21 @ 11:48)  POCT Blood Glucose.: 111 mg/dL (09-13-21 @ 07:44)  POCT Blood Glucose.: 135 mg/dL (09-13-21 @ 02:07)  POCT Blood Glucose.: 113 mg/dL (09-12-21 @ 22:00)  POCT Blood Glucose.: 80 mg/dL (09-12-21 @ 21:29)  POCT Blood Glucose.: 98 mg/dL (09-12-21 @ 15:49)  POCT Blood Glucose.: 132 mg/dL (09-12-21 @ 11:22)  POCT Blood Glucose.: 144 mg/dL (09-12-21 @ 06:56)  POCT Blood Glucose.: 119 mg/dL (09-12-21 @ 00:37)  POCT Blood Glucose.: 120 mg/dL (09-11-21 @ 22:46)  POCT Blood Glucose.: 118 mg/dL (09-11-21 @ 20:54)  POCT Blood Glucose.: 100 mg/dL (09-11-21 @ 19:42)  POCT Blood Glucose.: 93 mg/dL (09-11-21 @ 19:09)  POCT Blood Glucose.: 97 mg/dL (09-11-21 @ 18:46)  POCT Blood Glucose.: 124 mg/dL (09-11-21 @ 17:50)  POCT Blood Glucose.: 166 mg/dL (09-11-21 @ 16:47)  POCT Blood Glucose.: 173 mg/dL (09-11-21 @ 16:04)  POCT Blood Glucose.: 120 mg/dL (09-11-21 @ 14:58)  POCT Blood Glucose.: 152 mg/dL (09-11-21 @ 14:00)  POCT Blood Glucose.: 170 mg/dL (09-11-21 @ 12:49)  POCT Blood Glucose.: 159 mg/dL (09-11-21 @ 11:53)  POCT Blood Glucose.: 151 mg/dL (09-11-21 @ 11:52)  POCT Blood Glucose.: 91 mg/dL (09-11-21 @ 11:50)  POCT Blood Glucose.: 145 mg/dL (09-11-21 @ 10:52)  POCT Blood Glucose.: 158 mg/dL (09-11-21 @ 10:03)  POCT Blood Glucose.: 142 mg/dL (09-11-21 @ 08:55)  POCT Blood Glucose.: 164 mg/dL (09-11-21 @ 08:10)  POCT Blood Glucose.: 132 mg/dL (09-11-21 @ 07:01)  POCT Blood Glucose.: 112 mg/dL (09-11-21 @ 06:01)  POCT Blood Glucose.: 71 mg/dL (09-11-21 @ 05:07)  POCT Blood Glucose.: 110 mg/dL (09-11-21 @ 04:08)  POCT Blood Glucose.: 140 mg/dL (09-11-21 @ 03:10)  POCT Blood Glucose.: 159 mg/dL (09-11-21 @ 02:02)  POCT Blood Glucose.: 149 mg/dL (09-11-21 @ 01:06)  POCT Blood Glucose.: 124 mg/dL (09-11-21 @ 00:19)  POCT Blood Glucose.: 140 mg/dL (09-10-21 @ 23:07)  POCT Blood Glucose.: 131 mg/dL (09-10-21 @ 21:57)  POCT Blood Glucose.: 110 mg/dL (09-10-21 @ 20:59)  POCT Blood Glucose.: 75 mg/dL (09-10-21 @ 19:49)  POCT Blood Glucose.: 101 mg/dL (09-10-21 @ 18:50)  POCT Blood Glucose.: 118 mg/dL (09-10-21 @ 17:59)  POCT Blood Glucose.: 126 mg/dL (09-10-21 @ 16:51)  POCT Blood Glucose.: 162 mg/dL (09-10-21 @ 16:03)  POCT Blood Glucose.: 203 mg/dL (09-10-21 @ 15:17)  POCT Blood Glucose.: 209 mg/dL (09-10-21 @ 13:51)    09-13    135  |  101  |  18  ----------------------------<  98  4.5   |  25  |  1.20    EGFR if : 69  EGFR if non : 60    Ca    8.5      09-13  Mg     2.2     09-13  Phos  1.8     09-13    TPro  6.0  /  Alb  3.4  /  TBili  1.1  /  DBili  x   /  AST  25  /  ALT  33  /  AlkPhos  50  09-13          Thyroid Function Tests:  09-05 @ 10:39 TSH 6.00 FreeT4 -- T3 -- Anti TPO -- Anti Thyroglobulin Ab -- TSI --  09-03 @ 07:38 TSH 4.01 FreeT4 -- T3 -- Anti TPO -- Anti Thyroglobulin Ab -- TSI --                           Chief Complaint/Follow-up on: T2D    Subjective: Patient is eating well. His sister also had and she cooks for him at times, so he feels that he eats rather healthy.   He has 5-6/10 cp when he walks at the surgical site.     MEDICATIONS  (STANDING):  aspirin enteric coated 81 milliGRAM(s) Oral daily  atorvastatin 80 milliGRAM(s) Oral at bedtime  clopidogrel Tablet 75 milliGRAM(s) Oral daily  dextrose 50% Injectable 50 milliLiter(s) IV Push every 15 minutes  dextrose 50% Injectable 25 milliLiter(s) IV Push every 15 minutes  heparin   Injectable 5000 Unit(s) SubCutaneous every 8 hours  insulin glargine Injectable (LANTUS) 15 Unit(s) SubCutaneous at bedtime  insulin lispro (ADMELOG) corrective regimen sliding scale   SubCutaneous three times a day before meals  insulin lispro (ADMELOG) corrective regimen sliding scale   SubCutaneous at bedtime  insulin lispro Injectable (ADMELOG) 5 Unit(s) SubCutaneous three times a day before meals  metoprolol tartrate 25 milliGRAM(s) Oral two times a day  pantoprazole    Tablet 40 milliGRAM(s) Oral before breakfast  polyethylene glycol 3350 17 Gram(s) Oral daily  sodium chloride 0.9% lock flush 3 milliLiter(s) IV Push every 8 hours    MEDICATIONS  (PRN):  oxycodone    5 mG/acetaminophen 325 mG 1 Tablet(s) Oral every 4 hours PRN Mild Pain (1 - 3)  oxycodone    5 mG/acetaminophen 325 mG 2 Tablet(s) Oral every 6 hours PRN Moderate Pain (4 - 6)      PHYSICAL EXAM:  VITALS: T(C): 37.3 (09-13-21 @ 05:00)  T(F): 99.1 (09-13-21 @ 05:00), Max: 99.1 (09-13-21 @ 05:00)  HR: 92 (09-13-21 @ 10:40) (75 - 100)  BP: 106/69 (09-13-21 @ 10:40) (87/57 - 114/74)  RR:  (12 - 97)  SpO2:  (95% - 100%)  Wt(kg): --  GENERAL: NAD, well-groomed, well-developed  EYES: No proptosis, no injection  RESPIRATORY: Clear to auscultation bilaterally; No rales, rhonchi, wheezing, or rubs  CARDIOVASCULAR: Regular rate and rhythm; No murmurs  GI: Soft, nontender, non distended, normal bowel sounds      POCT Blood Glucose.: 187 mg/dL (09-13-21 @ 11:48)  POCT Blood Glucose.: 111 mg/dL (09-13-21 @ 07:44)  POCT Blood Glucose.: 135 mg/dL (09-13-21 @ 02:07)  POCT Blood Glucose.: 113 mg/dL (09-12-21 @ 22:00)  POCT Blood Glucose.: 80 mg/dL (09-12-21 @ 21:29)  POCT Blood Glucose.: 98 mg/dL (09-12-21 @ 15:49)  POCT Blood Glucose.: 132 mg/dL (09-12-21 @ 11:22)  POCT Blood Glucose.: 144 mg/dL (09-12-21 @ 06:56)  POCT Blood Glucose.: 119 mg/dL (09-12-21 @ 00:37)  POCT Blood Glucose.: 120 mg/dL (09-11-21 @ 22:46)  POCT Blood Glucose.: 118 mg/dL (09-11-21 @ 20:54)  POCT Blood Glucose.: 100 mg/dL (09-11-21 @ 19:42)  POCT Blood Glucose.: 93 mg/dL (09-11-21 @ 19:09)  POCT Blood Glucose.: 97 mg/dL (09-11-21 @ 18:46)  POCT Blood Glucose.: 124 mg/dL (09-11-21 @ 17:50)  POCT Blood Glucose.: 166 mg/dL (09-11-21 @ 16:47)  POCT Blood Glucose.: 173 mg/dL (09-11-21 @ 16:04)  POCT Blood Glucose.: 120 mg/dL (09-11-21 @ 14:58)  POCT Blood Glucose.: 152 mg/dL (09-11-21 @ 14:00)  POCT Blood Glucose.: 170 mg/dL (09-11-21 @ 12:49)  POCT Blood Glucose.: 159 mg/dL (09-11-21 @ 11:53)  POCT Blood Glucose.: 151 mg/dL (09-11-21 @ 11:52)  POCT Blood Glucose.: 91 mg/dL (09-11-21 @ 11:50)  POCT Blood Glucose.: 145 mg/dL (09-11-21 @ 10:52)  POCT Blood Glucose.: 158 mg/dL (09-11-21 @ 10:03)  POCT Blood Glucose.: 142 mg/dL (09-11-21 @ 08:55)  POCT Blood Glucose.: 164 mg/dL (09-11-21 @ 08:10)  POCT Blood Glucose.: 132 mg/dL (09-11-21 @ 07:01)  POCT Blood Glucose.: 112 mg/dL (09-11-21 @ 06:01)  POCT Blood Glucose.: 71 mg/dL (09-11-21 @ 05:07)  POCT Blood Glucose.: 110 mg/dL (09-11-21 @ 04:08)  POCT Blood Glucose.: 140 mg/dL (09-11-21 @ 03:10)  POCT Blood Glucose.: 159 mg/dL (09-11-21 @ 02:02)  POCT Blood Glucose.: 149 mg/dL (09-11-21 @ 01:06)  POCT Blood Glucose.: 124 mg/dL (09-11-21 @ 00:19)  POCT Blood Glucose.: 140 mg/dL (09-10-21 @ 23:07)  POCT Blood Glucose.: 131 mg/dL (09-10-21 @ 21:57)  POCT Blood Glucose.: 110 mg/dL (09-10-21 @ 20:59)  POCT Blood Glucose.: 75 mg/dL (09-10-21 @ 19:49)  POCT Blood Glucose.: 101 mg/dL (09-10-21 @ 18:50)  POCT Blood Glucose.: 118 mg/dL (09-10-21 @ 17:59)  POCT Blood Glucose.: 126 mg/dL (09-10-21 @ 16:51)  POCT Blood Glucose.: 162 mg/dL (09-10-21 @ 16:03)  POCT Blood Glucose.: 203 mg/dL (09-10-21 @ 15:17)  POCT Blood Glucose.: 209 mg/dL (09-10-21 @ 13:51)    09-13    135  |  101  |  18  ----------------------------<  98  4.5   |  25  |  1.20    EGFR if : 69  EGFR if non : 60    Ca    8.5      09-13  Mg     2.2     09-13  Phos  1.8     09-13    TPro  6.0  /  Alb  3.4  /  TBili  1.1  /  DBili  x   /  AST  25  /  ALT  33  /  AlkPhos  50  09-13          Thyroid Function Tests:  09-05 @ 10:39 TSH 6.00  09-03 @ 07:38 TSH 4.01

## 2021-09-13 NOTE — PROGRESS NOTE ADULT - ASSESSMENT
This is a  71 yr old male admitted from [facility]  after cardiac revealed CAD;  patient with HgA1C of 9%, pt unaware he was a diabetic and no other significant history.  Originally from [place].  Lived in this country x over 30 years.  Still active, works full-time.     On 9/9/21, pt underwent CABG x 3 w/ LIMA ef nl  postop course:  BB/STATIN/DVT PROPHY.  CT'S OUT  9/14 VSS - sbo 88 - BB dose decreased- IV bolus 500cc x 1 per DR. Barajas - karen d/c pw's &^ d/c home Tuesday

## 2021-09-13 NOTE — PROGRESS NOTE ADULT - PROBLEM SELECTOR PROBLEM 1
Coronary disease
Coronary disease
Uncontrolled type 2 diabetes mellitus with other circulatory complication
Coronary disease
S/P CABG x 3
S/P CABG x 3
Diabetes mellitus
Diabetes mellitus
Uncontrolled type 2 diabetes mellitus with other circulatory complication

## 2021-09-13 NOTE — PROGRESS NOTE ADULT - PROBLEM SELECTOR PLAN 1
-Adjust insulin as thus: Lantus 12 units sq bedtime and Admelog 3 units tid-AC   -Admelog small correction scale TID-AC/QHS  -diabetes diet  -RD consult  - for discharge: oral agents: Metformin +/- SGLT2 inhibitor. He requires glucometer teaching prior to discharge, placed provder to RN order.

## 2021-09-14 ENCOUNTER — TRANSCRIPTION ENCOUNTER (OUTPATIENT)
Age: 71
End: 2021-09-14

## 2021-09-14 VITALS
HEART RATE: 81 BPM | RESPIRATION RATE: 18 BRPM | TEMPERATURE: 98 F | DIASTOLIC BLOOD PRESSURE: 63 MMHG | SYSTOLIC BLOOD PRESSURE: 95 MMHG | OXYGEN SATURATION: 100 %

## 2021-09-14 LAB
ANION GAP SERPL CALC-SCNC: 12 MMOL/L — SIGNIFICANT CHANGE UP (ref 5–17)
BUN SERPL-MCNC: 16 MG/DL — SIGNIFICANT CHANGE UP (ref 7–23)
CALCIUM SERPL-MCNC: 9 MG/DL — SIGNIFICANT CHANGE UP (ref 8.4–10.5)
CHLORIDE SERPL-SCNC: 100 MMOL/L — SIGNIFICANT CHANGE UP (ref 96–108)
CO2 SERPL-SCNC: 22 MMOL/L — SIGNIFICANT CHANGE UP (ref 22–31)
CREAT SERPL-MCNC: 1.04 MG/DL — SIGNIFICANT CHANGE UP (ref 0.5–1.3)
GLUCOSE BLDC GLUCOMTR-MCNC: 137 MG/DL — HIGH (ref 70–99)
GLUCOSE BLDC GLUCOMTR-MCNC: 96 MG/DL — SIGNIFICANT CHANGE UP (ref 70–99)
GLUCOSE SERPL-MCNC: 83 MG/DL — SIGNIFICANT CHANGE UP (ref 70–99)
HCT VFR BLD CALC: 31.2 % — LOW (ref 39–50)
HGB BLD-MCNC: 9.9 G/DL — LOW (ref 13–17)
MAGNESIUM SERPL-MCNC: 2.1 MG/DL — SIGNIFICANT CHANGE UP (ref 1.6–2.6)
MCHC RBC-ENTMCNC: 25.5 PG — LOW (ref 27–34)
MCHC RBC-ENTMCNC: 31.7 GM/DL — LOW (ref 32–36)
MCV RBC AUTO: 80.4 FL — SIGNIFICANT CHANGE UP (ref 80–100)
NRBC # BLD: 0 /100 WBCS — SIGNIFICANT CHANGE UP (ref 0–0)
PLATELET # BLD AUTO: 311 K/UL — SIGNIFICANT CHANGE UP (ref 150–400)
POTASSIUM SERPL-MCNC: 4.4 MMOL/L — SIGNIFICANT CHANGE UP (ref 3.5–5.3)
POTASSIUM SERPL-SCNC: 4.4 MMOL/L — SIGNIFICANT CHANGE UP (ref 3.5–5.3)
RBC # BLD: 3.88 M/UL — LOW (ref 4.2–5.8)
RBC # FLD: 14.9 % — HIGH (ref 10.3–14.5)
SODIUM SERPL-SCNC: 134 MMOL/L — LOW (ref 135–145)
WBC # BLD: 12.98 K/UL — HIGH (ref 3.8–10.5)
WBC # FLD AUTO: 12.98 K/UL — HIGH (ref 3.8–10.5)

## 2021-09-14 PROCEDURE — 81001 URINALYSIS AUTO W/SCOPE: CPT

## 2021-09-14 PROCEDURE — 93005 ELECTROCARDIOGRAM TRACING: CPT

## 2021-09-14 PROCEDURE — 85384 FIBRINOGEN ACTIVITY: CPT

## 2021-09-14 PROCEDURE — 82550 ASSAY OF CK (CPK): CPT

## 2021-09-14 PROCEDURE — 82803 BLOOD GASES ANY COMBINATION: CPT

## 2021-09-14 PROCEDURE — 85014 HEMATOCRIT: CPT

## 2021-09-14 PROCEDURE — C1751: CPT

## 2021-09-14 PROCEDURE — U0005: CPT

## 2021-09-14 PROCEDURE — 86900 BLOOD TYPING SEROLOGIC ABO: CPT

## 2021-09-14 PROCEDURE — 86891 AUTOLOGOUS BLOOD OP SALVAGE: CPT

## 2021-09-14 PROCEDURE — 84443 ASSAY THYROID STIM HORMONE: CPT

## 2021-09-14 PROCEDURE — 85576 BLOOD PLATELET AGGREGATION: CPT

## 2021-09-14 PROCEDURE — U0003: CPT

## 2021-09-14 PROCEDURE — 87640 STAPH A DNA AMP PROBE: CPT

## 2021-09-14 PROCEDURE — 84295 ASSAY OF SERUM SODIUM: CPT

## 2021-09-14 PROCEDURE — 82947 ASSAY GLUCOSE BLOOD QUANT: CPT

## 2021-09-14 PROCEDURE — P9047: CPT

## 2021-09-14 PROCEDURE — 85610 PROTHROMBIN TIME: CPT

## 2021-09-14 PROCEDURE — 83036 HEMOGLOBIN GLYCOSYLATED A1C: CPT

## 2021-09-14 PROCEDURE — C1889: CPT

## 2021-09-14 PROCEDURE — 84484 ASSAY OF TROPONIN QUANT: CPT

## 2021-09-14 PROCEDURE — P9045: CPT

## 2021-09-14 PROCEDURE — 86850 RBC ANTIBODY SCREEN: CPT

## 2021-09-14 PROCEDURE — 82962 GLUCOSE BLOOD TEST: CPT

## 2021-09-14 PROCEDURE — 97116 GAIT TRAINING THERAPY: CPT

## 2021-09-14 PROCEDURE — 82565 ASSAY OF CREATININE: CPT

## 2021-09-14 PROCEDURE — 87641 MR-STAPH DNA AMP PROBE: CPT

## 2021-09-14 PROCEDURE — 84132 ASSAY OF SERUM POTASSIUM: CPT

## 2021-09-14 PROCEDURE — 82553 CREATINE MB FRACTION: CPT

## 2021-09-14 PROCEDURE — 83605 ASSAY OF LACTIC ACID: CPT

## 2021-09-14 PROCEDURE — 85730 THROMBOPLASTIN TIME PARTIAL: CPT

## 2021-09-14 PROCEDURE — 93880 EXTRACRANIAL BILAT STUDY: CPT

## 2021-09-14 PROCEDURE — 85025 COMPLETE CBC W/AUTO DIFF WBC: CPT

## 2021-09-14 PROCEDURE — 85027 COMPLETE CBC AUTOMATED: CPT

## 2021-09-14 PROCEDURE — 94002 VENT MGMT INPAT INIT DAY: CPT

## 2021-09-14 PROCEDURE — 82435 ASSAY OF BLOOD CHLORIDE: CPT

## 2021-09-14 PROCEDURE — 83735 ASSAY OF MAGNESIUM: CPT

## 2021-09-14 PROCEDURE — 83880 ASSAY OF NATRIURETIC PEPTIDE: CPT

## 2021-09-14 PROCEDURE — 80053 COMPREHEN METABOLIC PANEL: CPT

## 2021-09-14 PROCEDURE — 86901 BLOOD TYPING SEROLOGIC RH(D): CPT

## 2021-09-14 PROCEDURE — 82330 ASSAY OF CALCIUM: CPT

## 2021-09-14 PROCEDURE — 80048 BASIC METABOLIC PNL TOTAL CA: CPT

## 2021-09-14 PROCEDURE — 97162 PT EVAL MOD COMPLEX 30 MIN: CPT

## 2021-09-14 PROCEDURE — 86769 SARS-COV-2 COVID-19 ANTIBODY: CPT

## 2021-09-14 PROCEDURE — 86923 COMPATIBILITY TEST ELECTRIC: CPT

## 2021-09-14 PROCEDURE — C1769: CPT

## 2021-09-14 PROCEDURE — 85018 HEMOGLOBIN: CPT

## 2021-09-14 PROCEDURE — 71045 X-RAY EXAM CHEST 1 VIEW: CPT

## 2021-09-14 PROCEDURE — 84100 ASSAY OF PHOSPHORUS: CPT

## 2021-09-14 RX ORDER — ATORVASTATIN CALCIUM 80 MG/1
1 TABLET, FILM COATED ORAL
Qty: 30 | Refills: 0
Start: 2021-09-14 | End: 2021-10-13

## 2021-09-14 RX ORDER — METFORMIN HYDROCHLORIDE 850 MG/1
1 TABLET ORAL
Qty: 60 | Refills: 0
Start: 2021-09-14 | End: 2021-10-13

## 2021-09-14 RX ORDER — CLOPIDOGREL BISULFATE 75 MG/1
1 TABLET, FILM COATED ORAL
Qty: 30 | Refills: 0
Start: 2021-09-14 | End: 2021-10-13

## 2021-09-14 RX ORDER — OXYCODONE AND ACETAMINOPHEN 5; 325 MG/1; MG/1
1 TABLET ORAL
Qty: 20 | Refills: 0
Start: 2021-09-14 | End: 2021-09-18

## 2021-09-14 RX ORDER — METOPROLOL TARTRATE 50 MG
1 TABLET ORAL
Qty: 60 | Refills: 0
Start: 2021-09-14 | End: 2021-10-13

## 2021-09-14 RX ORDER — SENNA PLUS 8.6 MG/1
2 TABLET ORAL
Qty: 60 | Refills: 0
Start: 2021-09-14 | End: 2021-10-13

## 2021-09-14 RX ORDER — ASPIRIN/CALCIUM CARB/MAGNESIUM 324 MG
1 TABLET ORAL
Qty: 30 | Refills: 0
Start: 2021-09-14 | End: 2021-10-13

## 2021-09-14 RX ORDER — ATORVASTATIN CALCIUM 80 MG/1
0.5 TABLET, FILM COATED ORAL
Qty: 30 | Refills: 0 | DISCHARGE
Start: 2021-09-14 | End: 2021-10-13

## 2021-09-14 RX ADMIN — SODIUM CHLORIDE 3 MILLILITER(S): 9 INJECTION INTRAMUSCULAR; INTRAVENOUS; SUBCUTANEOUS at 06:16

## 2021-09-14 RX ADMIN — POLYETHYLENE GLYCOL 3350 17 GRAM(S): 17 POWDER, FOR SOLUTION ORAL at 08:18

## 2021-09-14 RX ADMIN — CLOPIDOGREL BISULFATE 75 MILLIGRAM(S): 75 TABLET, FILM COATED ORAL at 08:17

## 2021-09-14 RX ADMIN — Medication 25 MILLIGRAM(S): at 05:53

## 2021-09-14 RX ADMIN — SODIUM CHLORIDE 3 MILLILITER(S): 9 INJECTION INTRAMUSCULAR; INTRAVENOUS; SUBCUTANEOUS at 13:45

## 2021-09-14 RX ADMIN — Medication 3 UNIT(S): at 12:26

## 2021-09-14 RX ADMIN — Medication 81 MILLIGRAM(S): at 08:18

## 2021-09-14 RX ADMIN — Medication 3 UNIT(S): at 08:18

## 2021-09-14 RX ADMIN — HEPARIN SODIUM 5000 UNIT(S): 5000 INJECTION INTRAVENOUS; SUBCUTANEOUS at 05:53

## 2021-09-14 RX ADMIN — PANTOPRAZOLE SODIUM 40 MILLIGRAM(S): 20 TABLET, DELAYED RELEASE ORAL at 05:53

## 2021-09-14 NOTE — DISCHARGE NOTE NURSING/CASE MANAGEMENT/SOCIAL WORK - PATIENT PORTAL LINK FT
You can access the FollowMyHealth Patient Portal offered by [facility] by registering at the following website: [email or web address]. By joining [employer]’s FollowMyHealth portal, you will also be able to view your health information using other applications (apps) compatible with our system.

## 2021-09-14 NOTE — DISCHARGE NOTE PROVIDER - HOSPITAL COURSE
This is a  71 yr old male admitted from [facility]  after cardiac revealed CAD;  patient with HgA1C of 9%, pt unaware he was a diabetic and no other significant history.  Originally from [place].  Lived in this country x over 30 years.  Still active, works full-time.     On 9/9/21, pt underwent CABG x 3 w/ LIMA ef nl  postop course:  BB/STATIN/DVT PROPHY.    9/13 VSS - sbo 88 - BB dose decreased- IV bolus 500cc x 1 per DR. Barajas - will d/c pw's &^ d/c home Tuesday 9/14 endo consult appreciated - Metformin 500mg po  bid - glucometer teaching done - pt verbalizes understanding of glucometer use.  d/c home today.

## 2021-09-14 NOTE — DISCHARGE NOTE PROVIDER - NSDCACTIVITY_GEN_ALL_CORE
Sex allowed/Showering allowed/Stairs allowed/Walking - Indoors allowed/Walking - Outdoors allowed/Follow Instructions Provided by your Surgical Team

## 2021-09-14 NOTE — DISCHARGE NOTE PROVIDER - DETAILS OF MALNUTRITION DIAGNOSIS/DIAGNOSES
This patient has been assessed with a concern for Malnutrition and was treated during this hospitalization for the following Nutrition diagnosis/diagnoses:     -  09/10/2021: Moderate protein-calorie malnutrition

## 2021-09-14 NOTE — DISCHARGE NOTE PROVIDER - NSDCMRMEDTOKEN_GEN_ALL_CORE_FT
aspirin 81 mg oral delayed release tablet: 1 tab(s) orally once a day  atorvastatin 80 mg oral tablet: 1 tab(s) orally once a day (at bedtime)  clopidogrel 75 mg oral tablet: 1 tab(s) orally once a day  glucometer (per patient&#x27;s insurance): Test blood sugars four times a day. Dispense #1 glucometer.  lancets: 1 application subcutaneously 4 times a day   metFORMIN 500 mg oral tablet: 1 tab(s) orally 2 times a day     take before breakfast &amp; before dinner  metoprolol tartrate 25 mg oral tablet: 1 tab(s) orally 2 times a day  oxycodone-acetaminophen 5 mg-325 mg oral tablet: 1 tab(s) orally every 6 hours, As Needed -Mild Pain (1 - 3) MDD:4  senna oral tablet: 2 tab(s) orally once a day (at bedtime)   test strips (per patient&#x27;s insurance): 1 application subcutaneously 4 times a day. ** Compatible with patient&#x27;s glucometer **

## 2021-09-14 NOTE — DISCHARGE NOTE PROVIDER - NSDCPNSUBOBJ_GEN_ALL_CORE
PHYSICAL EXAM:  Neurology: alert and oriented x 3, nonfocal, no gross deficits  CV : S1S2  Sternal Wound :  CDI , Stable  Lungs: cta  Abdomen: soft, nontender, nondistended, positive bowel sounds, last bowel movement   +9/12      Extremities:     tr. edema LLE inc cdi w/ ace wrap, no calf tenderness

## 2021-09-14 NOTE — DISCHARGE NOTE PROVIDER - CARE PROVIDER_API CALL
Cliff Barajas)  Surgery; Thoracic Surgery  [address] [place] [address]  Phone: [phone]  Fax: [phone]  Follow Up Time:

## 2021-09-14 NOTE — DISCHARGE NOTE PROVIDER - NSDCCPCAREPLAN_GEN_ALL_CORE_FT
PRINCIPAL DISCHARGE DIAGNOSIS  Diagnosis: S/P CABG x 3  Assessment and Plan of Treatment: Refer to your Cardiac Surgery Do's & Dont's Fact Sheet  shower daily - wash your incisions with mild soap and water  weigh yourself daily - record weight gain > 2.5 pounds overnight to your MD  Follow up appt with DR. Cliff Barajas on Wednesday, Sept. 22 @ 10:30am  follow up appt with your Primary Care MD and/or Cardiologist in 2 weeks  take medications as prescribed  diabetic diet avoid sweets  check your glucose before breakfast & before dinner  maintain glucose < 150 to prevent infections  take metformin with food.  ambulate 4-5 times a day

## 2021-09-15 ENCOUNTER — APPOINTMENT (OUTPATIENT)
Dept: CARE COORDINATION | Facility: HOME HEALTH | Age: 71
End: 2021-09-15
Payer: COMMERCIAL

## 2021-09-15 VITALS
RESPIRATION RATE: 16 BRPM | HEART RATE: 88 BPM | DIASTOLIC BLOOD PRESSURE: 70 MMHG | SYSTOLIC BLOOD PRESSURE: 124 MMHG | OXYGEN SATURATION: 97 %

## 2021-09-15 PROCEDURE — 99024 POSTOP FOLLOW-UP VISIT: CPT

## 2021-09-15 NOTE — ASSESSMENT
[FreeTextEntry1] : 71M s/p cabg Dr. Barajas\par education and support provided\par pt looks more comfortable by end of visit, willing to participate in care

## 2021-09-15 NOTE — HISTORY OF PRESENT ILLNESS
[FreeTextEntry1] : 71M s/p CABG Dr. Barajas \par pt c/o one episode of voimiting and diarrhea last ight.\par pt seen laying on couch, encouraged pt to sit up to participate in visit\par all questions answered\par pt has not see physician in years, diagnosed 5 years ago with DM, but no follow up\par lives with extended family, good support\par

## 2021-09-22 ENCOUNTER — APPOINTMENT (OUTPATIENT)
Dept: CARDIOTHORACIC SURGERY | Facility: CLINIC | Age: 71
End: 2021-09-22
Payer: COMMERCIAL

## 2021-09-22 VITALS
RESPIRATION RATE: 16 BRPM | SYSTOLIC BLOOD PRESSURE: 120 MMHG | TEMPERATURE: 97.8 F | OXYGEN SATURATION: 100 % | HEIGHT: 66 IN | WEIGHT: 140 LBS | HEART RATE: 88 BPM | BODY MASS INDEX: 22.5 KG/M2 | DIASTOLIC BLOOD PRESSURE: 78 MMHG

## 2021-09-22 PROCEDURE — 99024 POSTOP FOLLOW-UP VISIT: CPT

## 2021-09-25 NOTE — CHART NOTE - NSCHARTNOTEFT_GEN_A_CORE
71 year old man with no reported PMH (hasn't seen PCP in 6 yrs) presents with NSTEMI, found to have multi-vessel disease on cath and now s/p CABG on 9/9 and  found to have new onset T2D (HbA1c 9.2%).  Endocrine consulted for hyperglycemia and new-onset diabetes.   POC glucose, insulin requirements, lab values reviewed for past 24 hours pt requiring 3-7 units admelog w/ BG , FBG tightly controlled at 96 this am.     Notified by primary team pt for d/c home today and has received glucometer teaching per team.    Given current insulin requirements and BG trend recommend dc on metformin 500mg BID po take with a meal. Check BG before breakfast and dinner.   Consider addition of SGLT2i as outpatient  Needs outpt endocrine established. Can call [facility] to schedule an appointment. [phone] or Can follow at [address]. Phone . Please call and make apt    Needs optho/podiatry follow up as out pt    Pt to call PCP until seen by endocrinologist if BG below 100mg/dl and/or persistently above 200 mg/dl for adjustments to regimen as outpatient.      d/w CTSx NP
Patient was in OR today during rounds therefore unable to see patient physically.  Chart and glucose reviewed.     71 year old man with no reported PMH (hasn't seen PCP in 6 yrs) presents with NSTEMI, found to have multi-vessel disease on cath and now awaiting transfer to [facility] for CT surgery evaluation. Patient found to have A1c of 9.2% new onset DM2.     POCT Blood Glucose.: 124 mg/dL (09-09-21 @ 12:25)  POCT Blood Glucose.: 147 mg/dL (09-09-21 @ 08:11)  POCT Blood Glucose.: 141 mg/dL (09-08-21 @ 21:29)  POCT Blood Glucose.: 90 mg/dL (09-08-21 @ 16:28)  POCT Blood Glucose.: 173 mg/dL (09-08-21 @ 11:38)  POCT Blood Glucose.: 98 mg/dL (09-08-21 @ 07:24)  POCT Blood Glucose.: 126 mg/dL (09-07-21 @ 23:22)  POCT Blood Glucose.: 84 mg/dL (09-07-21 @ 22:23)  POCT Blood Glucose.: 84 mg/dL (09-07-21 @ 21:42)  POCT Blood Glucose.: 99 mg/dL (09-07-21 @ 16:32)  POCT Blood Glucose.: 109 mg/dL (09-07-21 @ 12:07)  POCT Blood Glucose.: 95 mg/dL (09-07-21 @ 07:36)  POCT Blood Glucose.: 111 mg/dL (09-06-21 @ 21:33)  POCT Blood Glucose.: 96 mg/dL (09-06-21 @ 16:37)       1. Type 2 DM, uncontrolled.   Goal Glucose 100-180  Continue Lantus 15 Units HS and Admelog 5 U TIDAC  low correction scale qac and qhs  Will likely be going to CTICU after CABG surgery  Insulin gtt protocol as per CTICU for tight glycemic control in the post-operative period.   rge: plan to dc on oral agents (Metformin +/- SGLT2 inhibitor). He requires glucometer teaching prior to dc.    2. Hypertension, unspecified type.   Mgmt per primary team  BP goal<130/80.    3. Hyperlipidemia   Continue with statin therapy
Nutrition Follow Up Note  Patient seen for: initial malnutrition follow up and consult for new onset diabetes. Chart reviewed and events noted.     Per Chart: Pt is a is a  70 y/o Male admitted after cardiac revealed CAD; patient with HgA1C of 9%, pt unaware he was a diabetic and no other significant history. On 21, pt underwent CABG x 3 w/ LIMA ef nl.     Source: [x] Patient       [x] Medical Record        [] RN        [] Family at bedside       [] Other:    -If unable to interview patient: [] Trach/Vent/BiPAP  [] Disoriented/confused/inappropriate to interview    Diet Order:   Diet, Regular:   Consistent Carbohydrate {No Snacks} (CSTCHO) (09-10-21)    - Is current order appropriate/adequate? [x] Yes  []  No:     - PO intake :   [x] >75%  Adequate    [] 50-75%  Fair       [] <50%  Poor    Reports adequate PO intake and appetite.     - Nutrition-related concerns:      - A1C 8.9 % (21 @ 08:41), pt newly diagnosed with diabetes.       - Pt continues on Lantus, Admelog, and sliding scale of insulin; on consistent carbohydrate diet.         GI: Pt denies recent N/V, constipation, or diarrhea. Last BM .   Bowel Regimen? [x] Yes   [] No    Weights:   Daily Weight in k.9 (), 75.3 (), 67.6 ()  Dosing wt in k.3  Wt fluctuations likely 2/2 intraoperative fluid shifts. RD will continue to trend as new wts available/able.     Nutritionally Pertinent MEDICATIONS  (STANDING):  atorvastatin  dextrose 50% Injectable  dextrose 50% Injectable  insulin glargine Injectable (LANTUS)  insulin lispro (ADMELOG) corrective regimen sliding scale  insulin lispro (ADMELOG) corrective regimen sliding scale  insulin lispro Injectable (ADMELOG)  metoprolol tartrate  pantoprazole    Tablet  polyethylene glycol 3350  sodium chloride 0.9% lock flush    Pertinent Labs:  @ 04:50: Na 134<L>, BUN 16, Cr 1.04, BG 83, K+ 4.4, Mg 2.1    A1C with Estimated Average Glucose Result: 8.9 % (21 @ 08:41)  A1C with Estimated Average Glucose Result: 9.2 % (21 @ 07:38)  A1C with Estimated Average Glucose Result: 9.2 % (21 @ 10:13)    Finger Sticks:  POCT Blood Glucose.: 96 mg/dL ( @ 08:03)  POCT Blood Glucose.: 91 mg/dL ( @ 21:36)  POCT Blood Glucose.: 88 mg/dL ( @ 16:23)  POCT Blood Glucose.: 187 mg/dL ( @ 11:48)    Skin per nursing documentation: No pressure injuries noted. Midsternal incision  Edema per nursing documentation: +2 Natividad. Foot     Estimated Needs:   [x] no change since previous assessment  Based on weight of 151 pounds/ 68.4 kG (21)  Estimated Energy Needs: (25-30 kcals/kG) 5702-6212 kcals  Estimated Protein Needs: (1.4-1.6 gm/kG)  gm  Defer fluid needs to team    Previous Nutrition Diagnosis: Malnutrition Moderate, acute  Nutrition Diagnosis is: [x] ongoing  [] resolved [] not applicable     Previous Nutrition Diagnosis: Altered Nutrition Related Lab Values  Nutrition Diagnosis is: [x] ongoing  [] resolved [] not applicable     Nutrition Care Plan:  [x] In Progress  [] Achieved  [] Not applicable    New Nutrition Diagnosis: [x] Not applicable    Nutrition Interventions:     Education Provided   [x] Yes:  [] No: Extensive education provided to pt. Provided verbal and written education on heart healthy nutrition for people with diabetes s/p midsternal incision. Emphasis on pairing carbohydrates with protein for glycemic control; portion sizes; choosing whole grains vs refined carbohydrates; limiting saturated fat + sodium intake. Provided Heart-Healthy Consistent Carbohydrate Nutrition Therapy and Choose Your Foods: Foods Lists For Diabetes. Good comprehension noted. Pt made aware RD to remain available for any questions.     Recommendations:      1) Recommend change diet to Consistent Carbohydrate evening snack, low Na. RD remains available for diet changes as needed/able.   2) Reinforce nutrition education as able.     Monitoring and Evaluation:   Continue to monitor nutritional intake, tolerance to diet prescription, weights, labs, skin integrity    RD remains available upon request and will follow up per protocol  Selena Berry, MS, RD, CDN Pager #[phone]
This patient developed a combination of post-operative hypovolemic and vasogenic shock requiring aggressive IV fluid resuscitation as well as the use of levophed for approximately 48 hours. This patient did not have respiratory failure, but rather a supplemental oxygen requirement which is multifactorial in nature and expected after cardiac surgery.
no visual changes.

## 2021-09-30 ENCOUNTER — APPOINTMENT (OUTPATIENT)
Dept: ENDOCRINOLOGY | Facility: CLINIC | Age: 71
End: 2021-09-30

## 2021-10-05 DIAGNOSIS — Z87.891 PERSONAL HISTORY OF NICOTINE DEPENDENCE: ICD-10-CM

## 2021-10-05 DIAGNOSIS — Z78.9 OTHER SPECIFIED HEALTH STATUS: ICD-10-CM

## 2021-10-06 ENCOUNTER — APPOINTMENT (OUTPATIENT)
Dept: CARDIOTHORACIC SURGERY | Facility: CLINIC | Age: 71
End: 2021-10-06
Payer: COMMERCIAL

## 2021-10-06 ENCOUNTER — NON-APPOINTMENT (OUTPATIENT)
Age: 71
End: 2021-10-06

## 2021-10-06 ENCOUNTER — APPOINTMENT (OUTPATIENT)
Dept: CARDIOTHORACIC SURGERY | Facility: CLINIC | Age: 71
End: 2021-10-06

## 2021-10-06 VITALS
RESPIRATION RATE: 16 BRPM | SYSTOLIC BLOOD PRESSURE: 120 MMHG | HEIGHT: 66 IN | TEMPERATURE: 98 F | HEART RATE: 84 BPM | WEIGHT: 136 LBS | DIASTOLIC BLOOD PRESSURE: 81 MMHG | BODY MASS INDEX: 21.86 KG/M2 | OXYGEN SATURATION: 99 %

## 2021-10-06 PROCEDURE — 99024 POSTOP FOLLOW-UP VISIT: CPT

## 2021-10-13 ENCOUNTER — TRANSCRIPTION ENCOUNTER (OUTPATIENT)
Age: 71
End: 2021-10-13

## 2021-10-13 ENCOUNTER — APPOINTMENT (OUTPATIENT)
Dept: CARDIOLOGY | Facility: CLINIC | Age: 71
End: 2021-10-13
Payer: COMMERCIAL

## 2021-10-13 ENCOUNTER — NON-APPOINTMENT (OUTPATIENT)
Age: 71
End: 2021-10-13

## 2021-10-13 VITALS
BODY MASS INDEX: 21.95 KG/M2 | HEART RATE: 76 BPM | SYSTOLIC BLOOD PRESSURE: 110 MMHG | WEIGHT: 136 LBS | OXYGEN SATURATION: 98 % | DIASTOLIC BLOOD PRESSURE: 75 MMHG | RESPIRATION RATE: 16 BRPM

## 2021-10-13 PROCEDURE — 93000 ELECTROCARDIOGRAM COMPLETE: CPT

## 2021-10-13 PROCEDURE — 99205 OFFICE O/P NEW HI 60 MIN: CPT

## 2021-10-14 LAB
HBA1C MFR BLD HPLC: 8.9
HCV AB SER QL: NOT DETECTED

## 2021-10-15 ENCOUNTER — APPOINTMENT (OUTPATIENT)
Dept: INTERNAL MEDICINE | Facility: CLINIC | Age: 71
End: 2021-10-15
Payer: COMMERCIAL

## 2021-10-15 VITALS
TEMPERATURE: 97.5 F | DIASTOLIC BLOOD PRESSURE: 70 MMHG | HEIGHT: 66 IN | WEIGHT: 136 LBS | OXYGEN SATURATION: 98 % | BODY MASS INDEX: 21.86 KG/M2 | HEART RATE: 81 BPM | SYSTOLIC BLOOD PRESSURE: 100 MMHG

## 2021-10-15 DIAGNOSIS — Z82.49 FAMILY HISTORY OF ISCHEMIC HEART DISEASE AND OTHER DISEASES OF THE CIRCULATORY SYSTEM: ICD-10-CM

## 2021-10-15 DIAGNOSIS — R22.1 LOCALIZED SWELLING, MASS AND LUMP, NECK: ICD-10-CM

## 2021-10-15 DIAGNOSIS — I25.2 OLD MYOCARDIAL INFARCTION: ICD-10-CM

## 2021-10-15 DIAGNOSIS — Z23 ENCOUNTER FOR IMMUNIZATION: ICD-10-CM

## 2021-10-15 PROCEDURE — 99387 INIT PM E/M NEW PAT 65+ YRS: CPT | Mod: 25

## 2021-10-15 PROCEDURE — 36415 COLL VENOUS BLD VENIPUNCTURE: CPT

## 2021-10-15 PROCEDURE — G0444 DEPRESSION SCREEN ANNUAL: CPT | Mod: 59

## 2021-10-15 RX ORDER — BLOOD SUGAR DIAGNOSTIC
STRIP MISCELLANEOUS
Qty: 100 | Refills: 0 | Status: DISCONTINUED | COMMUNITY
Start: 2021-10-07

## 2021-10-15 RX ORDER — BLOOD-GLUCOSE METER
W/DEVICE EACH MISCELLANEOUS
Qty: 1 | Refills: 0 | Status: DISCONTINUED | COMMUNITY
Start: 2021-09-14

## 2021-10-15 RX ORDER — OXYCODONE AND ACETAMINOPHEN 5; 325 MG/1; MG/1
5-325 TABLET ORAL
Qty: 20 | Refills: 0 | Status: DISCONTINUED | COMMUNITY
Start: 2021-09-14

## 2021-10-15 RX ORDER — METFORMIN HYDROCHLORIDE 500 MG/1
500 TABLET, COATED ORAL
Qty: 14 | Refills: 0 | Status: DISCONTINUED | COMMUNITY
Start: 2021-09-15 | End: 2021-10-15

## 2021-10-15 NOTE — HISTORY OF PRESENT ILLNESS
[FreeTextEntry1] : Establish care  [de-identified] : Mr. MAEVE JARVIS is a 71 year old man with pmhx of T2DM, 3v CAD s/p CABG x 3 LIMA, Normal LV function, Anemia who presents for physical appointment. \par \par He endorses episodes of nausea and vomiting. Daughter is concerned he is losing weight due not eating. She thinks the metformin is giving him upset stomach. Agreeable in switching to extended release dosage. He complains of occasional reflux. He states he feels like a lump in his throat. Symptoms were worse after he was intubated. He is taking medications as prescribed. He endorses feeling deconditioned.

## 2021-10-15 NOTE — PHYSICAL EXAM
[No Acute Distress] : no acute distress [Well Nourished] : well nourished [Well Developed] : well developed [Normal Sclera/Conjunctiva] : normal sclera/conjunctiva [EOMI] : extraocular movements intact [Normal Outer Ear/Nose] : the outer ears and nose were normal in appearance [No JVD] : no jugular venous distention [Supple] : supple [No Respiratory Distress] : no respiratory distress  [Clear to Auscultation] : lungs were clear to auscultation bilaterally [Normal Rate] : normal rate  [Regular Rhythm] : with a regular rhythm [Normal S1, S2] : normal S1 and S2 [No Murmur] : no murmur heard [Pedal Pulses Present] : the pedal pulses are present [No Edema] : there was no peripheral edema [No Extremity Clubbing/Cyanosis] : no extremity clubbing/cyanosis [Soft] : abdomen soft [Non Tender] : non-tender [Non-distended] : non-distended [Normal Bowel Sounds] : normal bowel sounds [Normal Posterior Cervical Nodes] : no posterior cervical lymphadenopathy [Normal Anterior Cervical Nodes] : no anterior cervical lymphadenopathy [No CVA Tenderness] : no CVA  tenderness [No Spinal Tenderness] : no spinal tenderness [No Joint Swelling] : no joint swelling [Grossly Normal Strength/Tone] : grossly normal strength/tone [No Rash] : no rash [Coordination Grossly Intact] : coordination grossly intact [No Focal Deficits] : no focal deficits [Normal Gait] : normal gait [Normal Affect] : the affect was normal [Normal Insight/Judgement] : insight and judgment were intact [Comprehensive Foot Exam Normal] : Right and left foot were examined and both feet are normal. No ulcers in either foot. Toes are normal and with full ROM.  Normal tactile sensation with monofilament testing throughout both feet

## 2021-10-15 NOTE — ASSESSMENT
[FreeTextEntry1] : 1) HCM- Encouraged increase food intake and regular physical activitty. Labs ordered as below. He declined all vaccination. Health care proxy form provided today. Referral provided as below. \par \par 2) CAD - Hx of NSTEMI. s/p CABG x 3. Counseled on continuing on aspirin and plavix. Advised to monitor for any signs of bleeding. Will consider decreasing statin dose depending on blood work. Possibly intolerance.

## 2021-10-15 NOTE — HEALTH RISK ASSESSMENT
[0] : 2) Feeling down, depressed, or hopeless: Not at all (0) [PHQ-2 Negative - No further assessment needed] : PHQ-2 Negative - No further assessment needed [Patient declined bone density test] : Patient declined bone density test [Patient declined colonoscopy] : Patient declined colonoscopy [None] : None [With Family] : lives with family [On disability] : on disability [Single] : single [Feels Safe at Home] : Feels safe at home [Fully functional (bathing, dressing, toileting, transferring, walking, feeding)] : Fully functional (bathing, dressing, toileting, transferring, walking, feeding) [Fully functional (using the telephone, shopping, preparing meals, housekeeping, doing laundry, using] : Fully functional and needs no help or supervision to perform IADLs (using the telephone, shopping, preparing meals, housekeeping, doing laundry, using transportation, managing medications and managing finances) [Independent] : using telephone [Some assistance needed] : managing finances [Full assistance needed] : managing medications [Smoke Detector] : smoke detector [Carbon Monoxide Detector] : carbon monoxide detector [Seat Belt] :  uses seat belt [Sunscreen] : uses sunscreen [Good] : ~his/her~  mood as  good [No] : In the past 12 months have you used drugs other than those required for medical reasons? No [No falls in past year] : Patient reported no falls in the past year [With Patient/Caregiver] : , with patient/caregiver [] : No [de-identified] : balanced [de-identified] : walking [[id number]] : 0 [Reports changes in hearing] : Reports no changes in hearing [Reports changes in vision] : Reports no changes in vision [Reports normal functional visual acuity (ie: able to read med bottle)] : Reports poor functional visual acuity.  [AdvancecareDate] : 10/21 [FreeTextEntry4] : HCP form provided today.

## 2021-10-16 LAB
25(OH)D3 SERPL-MCNC: 15 NG/ML
ALBUMIN SERPL ELPH-MCNC: 4.2 G/DL
ALP BLD-CCNC: 222 U/L
ALT SERPL-CCNC: 80 U/L
ANION GAP SERPL CALC-SCNC: 14 MMOL/L
AST SERPL-CCNC: 67 U/L
BASOPHILS # BLD AUTO: 0.1 K/UL
BASOPHILS NFR BLD AUTO: 1.1 %
BILIRUB SERPL-MCNC: 1.2 MG/DL
BUN SERPL-MCNC: 13 MG/DL
CALCIUM SERPL-MCNC: 9.6 MG/DL
CHLORIDE SERPL-SCNC: 100 MMOL/L
CHOLEST SERPL-MCNC: 79 MG/DL
CO2 SERPL-SCNC: 24 MMOL/L
CREAT SERPL-MCNC: 1.2 MG/DL
CREAT SPEC-SCNC: 424 MG/DL
EOSINOPHIL # BLD AUTO: 0.25 K/UL
EOSINOPHIL NFR BLD AUTO: 2.8 %
ESTIMATED AVERAGE GLUCOSE: 151 MG/DL
GLUCOSE SERPL-MCNC: 104 MG/DL
HBA1C MFR BLD HPLC: 6.9 %
HCT VFR BLD CALC: 38.8 %
HDLC SERPL-MCNC: 32 MG/DL
HGB BLD-MCNC: 11.7 G/DL
IMM GRANULOCYTES NFR BLD AUTO: 0.2 %
LDLC SERPL CALC-MCNC: 31 MG/DL
LYMPHOCYTES # BLD AUTO: 3.02 K/UL
LYMPHOCYTES NFR BLD AUTO: 33.7 %
MAN DIFF?: NORMAL
MCHC RBC-ENTMCNC: 24.1 PG
MCHC RBC-ENTMCNC: 30.2 GM/DL
MCV RBC AUTO: 79.8 FL
MICROALBUMIN 24H UR DL<=1MG/L-MCNC: 57.7 MG/DL
MICROALBUMIN/CREAT 24H UR-RTO: 136 MG/G
MONOCYTES # BLD AUTO: 0.81 K/UL
MONOCYTES NFR BLD AUTO: 9.1 %
NEUTROPHILS # BLD AUTO: 4.75 K/UL
NEUTROPHILS NFR BLD AUTO: 53.1 %
NONHDLC SERPL-MCNC: 48 MG/DL
PLATELET # BLD AUTO: 445 K/UL
POTASSIUM SERPL-SCNC: 4.1 MMOL/L
PROT SERPL-MCNC: 7.6 G/DL
PSA SERPL-MCNC: 0.26 NG/ML
RBC # BLD: 4.86 M/UL
RBC # FLD: 16.3 %
SODIUM SERPL-SCNC: 138 MMOL/L
TRIGL SERPL-MCNC: 84 MG/DL
TSH SERPL-ACNC: 2.17 UIU/ML
VIT B12 SERPL-MCNC: 716 PG/ML
WBC # FLD AUTO: 8.95 K/UL

## 2021-10-18 LAB
APPEARANCE: ABNORMAL
BACTERIA: NEGATIVE
BILIRUBIN URINE: ABNORMAL
BLOOD URINE: NEGATIVE
CALCIUM OXALATE CRYSTALS: ABNORMAL
COLOR: YELLOW
GLUCOSE QUALITATIVE U: NEGATIVE
HYALINE CASTS: 4 /LPF
KETONES URINE: NORMAL
LEUKOCYTE ESTERASE URINE: NEGATIVE
MICROSCOPIC-UA: NORMAL
NITRITE URINE: NEGATIVE
PH URINE: 6
PROTEIN URINE: ABNORMAL
RED BLOOD CELLS URINE: 5 /HPF
SPECIFIC GRAVITY URINE: 1.03
SQUAMOUS EPITHELIAL CELLS: 6 /HPF
URINE COMMENTS: NORMAL
UROBILINOGEN URINE: ABNORMAL
WHITE BLOOD CELLS URINE: 10 /HPF

## 2021-10-21 ENCOUNTER — APPOINTMENT (OUTPATIENT)
Dept: INTERNAL MEDICINE | Facility: CLINIC | Age: 71
End: 2021-10-21

## 2021-10-25 ENCOUNTER — APPOINTMENT (OUTPATIENT)
Dept: INTERNAL MEDICINE | Facility: CLINIC | Age: 71
End: 2021-10-25

## 2021-10-29 ENCOUNTER — APPOINTMENT (OUTPATIENT)
Dept: OPHTHALMOLOGY | Facility: CLINIC | Age: 71
End: 2021-10-29
Payer: COMMERCIAL

## 2021-10-29 ENCOUNTER — NON-APPOINTMENT (OUTPATIENT)
Age: 71
End: 2021-10-29

## 2021-10-29 PROCEDURE — 92004 COMPRE OPH EXAM NEW PT 1/>: CPT

## 2021-10-29 PROCEDURE — 92133 CPTRZD OPH DX IMG PST SGM ON: CPT

## 2021-11-03 ENCOUNTER — OUTPATIENT (OUTPATIENT)
Dept: OUTPATIENT SERVICES | Facility: HOSPITAL | Age: 71
LOS: 1 days | End: 2021-11-03
Payer: COMMERCIAL

## 2021-11-03 ENCOUNTER — APPOINTMENT (OUTPATIENT)
Dept: ULTRASOUND IMAGING | Facility: CLINIC | Age: 71
End: 2021-11-03
Payer: COMMERCIAL

## 2021-11-03 DIAGNOSIS — Z03.89 ENCOUNTER FOR OBSERVATION FOR OTHER SUSPECTED DISEASES AND CONDITIONS RULED OUT: ICD-10-CM

## 2021-11-03 DIAGNOSIS — Z98.49 CATARACT EXTRACTION STATUS, UNSPECIFIED EYE: Chronic | ICD-10-CM

## 2021-11-03 DIAGNOSIS — Z00.8 ENCOUNTER FOR OTHER GENERAL EXAMINATION: ICD-10-CM

## 2021-11-03 DIAGNOSIS — Z87.891 PERSONAL HISTORY OF NICOTINE DEPENDENCE: ICD-10-CM

## 2021-11-03 PROCEDURE — 76775 US EXAM ABDO BACK WALL LIM: CPT

## 2021-11-03 PROCEDURE — 76706 US ABDL AORTA SCREEN AAA: CPT | Mod: 26

## 2021-11-16 ENCOUNTER — APPOINTMENT (OUTPATIENT)
Dept: INTERNAL MEDICINE | Facility: CLINIC | Age: 71
End: 2021-11-16
Payer: COMMERCIAL

## 2021-11-16 LAB
ALBUMIN SERPL ELPH-MCNC: 4 G/DL
ALP BLD-CCNC: 151 U/L
ALT SERPL-CCNC: 33 U/L
ANION GAP SERPL CALC-SCNC: 12 MMOL/L
APPEARANCE: CLEAR
AST SERPL-CCNC: 32 U/L
BACTERIA: NEGATIVE
BASOPHILS # BLD AUTO: 0.06 K/UL
BASOPHILS NFR BLD AUTO: 0.9 %
BILIRUB SERPL-MCNC: 1.2 MG/DL
BILIRUBIN URINE: NEGATIVE
BLOOD URINE: NEGATIVE
BUN SERPL-MCNC: 12 MG/DL
CALCIUM SERPL-MCNC: 9.8 MG/DL
CHLORIDE SERPL-SCNC: 103 MMOL/L
CO2 SERPL-SCNC: 24 MMOL/L
COLOR: YELLOW
CREAT SERPL-MCNC: 1.12 MG/DL
EOSINOPHIL # BLD AUTO: 0.11 K/UL
EOSINOPHIL NFR BLD AUTO: 1.6 %
GLUCOSE QUALITATIVE U: NEGATIVE
GLUCOSE SERPL-MCNC: 96 MG/DL
HCT VFR BLD CALC: 38.8 %
HGB BLD-MCNC: 11.7 G/DL
HYALINE CASTS: 2 /LPF
IMM GRANULOCYTES NFR BLD AUTO: 0.1 %
KETONES URINE: NEGATIVE
LEUKOCYTE ESTERASE URINE: NEGATIVE
LYMPHOCYTES # BLD AUTO: 2.87 K/UL
LYMPHOCYTES NFR BLD AUTO: 42.5 %
MAN DIFF?: NORMAL
MCHC RBC-ENTMCNC: 23.3 PG
MCHC RBC-ENTMCNC: 30.2 GM/DL
MCV RBC AUTO: 77.3 FL
MICROSCOPIC-UA: NORMAL
MONOCYTES # BLD AUTO: 0.7 K/UL
MONOCYTES NFR BLD AUTO: 10.4 %
NEUTROPHILS # BLD AUTO: 3 K/UL
NEUTROPHILS NFR BLD AUTO: 44.5 %
NITRITE URINE: NEGATIVE
PH URINE: 6
PLATELET # BLD AUTO: 398 K/UL
POTASSIUM SERPL-SCNC: 4.4 MMOL/L
PROT SERPL-MCNC: 7.1 G/DL
PROTEIN URINE: ABNORMAL
RBC # BLD: 5.02 M/UL
RBC # FLD: 17.4 %
RED BLOOD CELLS URINE: 1 /HPF
SODIUM SERPL-SCNC: 139 MMOL/L
SPECIFIC GRAVITY URINE: 1.01
SQUAMOUS EPITHELIAL CELLS: 1 /HPF
UROBILINOGEN URINE: NORMAL
WBC # FLD AUTO: 6.75 K/UL
WHITE BLOOD CELLS URINE: 2 /HPF

## 2021-11-16 PROCEDURE — 81003 URINALYSIS AUTO W/O SCOPE: CPT | Mod: QW

## 2021-11-16 PROCEDURE — 36415 COLL VENOUS BLD VENIPUNCTURE: CPT

## 2021-11-17 ENCOUNTER — APPOINTMENT (OUTPATIENT)
Dept: CARDIOLOGY | Facility: CLINIC | Age: 71
End: 2021-11-17
Payer: COMMERCIAL

## 2021-11-17 ENCOUNTER — NON-APPOINTMENT (OUTPATIENT)
Age: 71
End: 2021-11-17

## 2021-11-17 VITALS
OXYGEN SATURATION: 100 % | TEMPERATURE: 98.3 F | SYSTOLIC BLOOD PRESSURE: 116 MMHG | DIASTOLIC BLOOD PRESSURE: 72 MMHG | HEART RATE: 70 BPM | HEIGHT: 66 IN | WEIGHT: 133.56 LBS | BODY MASS INDEX: 21.46 KG/M2

## 2021-11-17 LAB — BACTERIA UR CULT: NORMAL

## 2021-11-17 PROCEDURE — 93000 ELECTROCARDIOGRAM COMPLETE: CPT

## 2021-11-17 PROCEDURE — 99215 OFFICE O/P EST HI 40 MIN: CPT

## 2021-11-19 NOTE — DISCUSSION/SUMMARY
[FreeTextEntry1] : CAD, status post bypass surgery, doing well, considering he had NSTEMI we will continue with aspirin and clopidogrel and aggressive risk factor modification\par \par Hypertension, blood pressure is at goal, continue with losartan and metoprolol\par \par Type 2 diabetes his last A1c was 6.9, recommend continue with Metformin, follow-up with primary care physician, recommended importance of regular exercise 40 minutes of moderate intensity 4-5 times a week, addition restricting calorie/carb/processed food intake and emphasis on high-fiber diet.\par \par

## 2021-11-19 NOTE — HISTORY OF PRESENT ILLNESS
[FreeTextEntry1] : 71-year-old male history of hypertension, hyperlipidemia, type 2 diabetes.  Patient had NSTEMI in September 2021, found to have triple-vessel coronary artery disease underwent three-vessel bypass, LIMA to the LAD SVG to OM1 SVG to RPDA.  Ejection fraction.  Was normal.\par On the last visit patient had complained that since his surgery he is got numbness in the right hand along the fourth and the fifth digit, he was subsequently seen by cardiothoracic surgery Dr. Carpio, there is no intervention to be needed for the the weakness as its getting better is likely related to his diabetes and also postsurgical probably compression.  Overall he feels well no chest pain shortness of breath palpitations syncope or presyncope.

## 2021-11-19 NOTE — REVIEW OF SYSTEMS
[Fever] : no fever [Feeling Fatigued] : not feeling fatigued [Blurry Vision] : no blurred vision [Earache] : no earache [SOB] : no shortness of breath [Leg Claudication] : no intermittent leg claudication [Cough] : no cough [Abdominal Pain] : no abdominal pain [Joint Pain] : no joint pain [de-identified] : See HPI

## 2021-11-19 NOTE — PHYSICAL EXAM
[Well Developed] : well developed [Normal Conjunctiva] : normal conjunctiva [Normal Venous Pressure] : normal venous pressure [Normal S1, S2] : normal S1, S2 [Normal] : no edema, no cyanosis, no clubbing, no varicosities [de-identified] : Well-healed surgical wound

## 2021-11-22 LAB — HEMOCCULT STL QL IA: NEGATIVE

## 2021-11-24 ENCOUNTER — APPOINTMENT (OUTPATIENT)
Dept: INTERNAL MEDICINE | Facility: CLINIC | Age: 71
End: 2021-11-24

## 2021-12-28 NOTE — DISCUSSION/SUMMARY
[FreeTextEntry1] : CAD, status post bypass surgery, doing well,  dapt and aggressive sidney factor modification \par \par Hypertension, blood pressure is at goal, continue with losartan and metoprolol\par \par Type 2 diabetes his last A1c was 6.9, recommend continue with Metformin, follow-up with primary care physician, recommended importance of regular exercise 40 minutes of moderate intensity 4-5 times a week, addition restricting calorie/carb/processed food intake and emphasis on high-fiber diet.\par \par

## 2021-12-28 NOTE — PHYSICAL EXAM
[Well Developed] : well developed [Normal Conjunctiva] : normal conjunctiva [Normal Venous Pressure] : normal venous pressure [Normal S1, S2] : normal S1, S2 [Normal] : no edema, no cyanosis, no clubbing, no varicosities [de-identified] : Well-healed surgical wound

## 2021-12-28 NOTE — REVIEW OF SYSTEMS
[Fever] : no fever [Feeling Fatigued] : not feeling fatigued [Blurry Vision] : no blurred vision [Earache] : no earache [SOB] : no shortness of breath [Leg Claudication] : no intermittent leg claudication [Cough] : no cough [Abdominal Pain] : no abdominal pain [Joint Pain] : no joint pain [de-identified] : See HPI

## 2022-01-18 ENCOUNTER — APPOINTMENT (OUTPATIENT)
Dept: INTERNAL MEDICINE | Facility: CLINIC | Age: 72
End: 2022-01-18
Payer: MEDICARE

## 2022-01-18 ENCOUNTER — LABORATORY RESULT (OUTPATIENT)
Age: 72
End: 2022-01-18

## 2022-01-18 ENCOUNTER — TRANSCRIPTION ENCOUNTER (OUTPATIENT)
Age: 72
End: 2022-01-18

## 2022-01-18 VITALS
SYSTOLIC BLOOD PRESSURE: 92 MMHG | BODY MASS INDEX: 22.2 KG/M2 | HEART RATE: 72 BPM | HEIGHT: 64 IN | WEIGHT: 130 LBS | TEMPERATURE: 97.3 F | OXYGEN SATURATION: 99 % | DIASTOLIC BLOOD PRESSURE: 60 MMHG

## 2022-01-18 PROCEDURE — 36415 COLL VENOUS BLD VENIPUNCTURE: CPT

## 2022-01-18 PROCEDURE — 99214 OFFICE O/P EST MOD 30 MIN: CPT | Mod: 25

## 2022-01-18 NOTE — PHYSICAL EXAM
[No Acute Distress] : no acute distress [Well Nourished] : well nourished [Well Developed] : well developed [Normal Sclera/Conjunctiva] : normal sclera/conjunctiva [EOMI] : extraocular movements intact [Normal Outer Ear/Nose] : the outer ears and nose were normal in appearance [Normal TMs] : both tympanic membranes were normal [No JVD] : no jugular venous distention [Supple] : supple [No Respiratory Distress] : no respiratory distress  [No Accessory Muscle Use] : no accessory muscle use [Clear to Auscultation] : lungs were clear to auscultation bilaterally [Normal Rate] : normal rate  [Regular Rhythm] : with a regular rhythm [Normal S1, S2] : normal S1 and S2 [No Murmur] : no murmur heard [Pedal Pulses Present] : the pedal pulses are present [No Edema] : there was no peripheral edema [No Extremity Clubbing/Cyanosis] : no extremity clubbing/cyanosis [Soft] : abdomen soft [Non Tender] : non-tender [Non-distended] : non-distended [Normal Bowel Sounds] : normal bowel sounds [Normal Posterior Cervical Nodes] : no posterior cervical lymphadenopathy [Normal Anterior Cervical Nodes] : no anterior cervical lymphadenopathy [No CVA Tenderness] : no CVA  tenderness [No Spinal Tenderness] : no spinal tenderness [No Joint Swelling] : no joint swelling [Grossly Normal Strength/Tone] : grossly normal strength/tone [No Rash] : no rash [Coordination Grossly Intact] : coordination grossly intact [No Focal Deficits] : no focal deficits [Normal Gait] : normal gait [Normal Affect] : the affect was normal [Normal Insight/Judgement] : insight and judgment were intact

## 2022-01-18 NOTE — HISTORY OF PRESENT ILLNESS
[FreeTextEntry1] : Follow up  [de-identified] : Mr. MAEVE JARVIS is a 71 year old man with pmhx of T2DM, HTN, HLD, 3v CAD s/p CABG x 3 LIMA, Normal LV function, Anemia who presents for a follow up. He is accompanined by daughter. He has no complaints. He is taking medication as prescribed. He stopped famotidine due to epistaxis, possibly from dry nose. I encouraged him to get pnuemococcal vaccine given age, DM and heart disease. He does not want vaccination. His blood pressure and sugar at home are at goal. He denied dizziness, lightheadiness, palpitations and shortness of breath.

## 2022-01-18 NOTE — ASSESSMENT
[FreeTextEntry1] : Medication renewed today. He declines vaccine. \par \par He has weight loss, FOBT was negative. No GI symptoms. Had CXR and us abd in the past.

## 2022-01-19 LAB
25(OH)D3 SERPL-MCNC: 30.1 NG/ML
ALBUMIN SERPL ELPH-MCNC: 4.2 G/DL
ALP BLD-CCNC: 100 U/L
ALT SERPL-CCNC: 27 U/L
ANION GAP SERPL CALC-SCNC: 12 MMOL/L
AST SERPL-CCNC: 23 U/L
BASOPHILS # BLD AUTO: 0.05 K/UL
BASOPHILS NFR BLD AUTO: 0.6 %
BILIRUB SERPL-MCNC: 0.9 MG/DL
BUN SERPL-MCNC: 19 MG/DL
CALCIUM SERPL-MCNC: 9.5 MG/DL
CHLORIDE SERPL-SCNC: 101 MMOL/L
CHOLEST SERPL-MCNC: 87 MG/DL
CK SERPL-CCNC: 89 U/L
CO2 SERPL-SCNC: 25 MMOL/L
CREAT SERPL-MCNC: 1.15 MG/DL
CREAT SPEC-SCNC: 61 MG/DL
EOSINOPHIL # BLD AUTO: 0.11 K/UL
EOSINOPHIL NFR BLD AUTO: 1.4 %
ESTIMATED AVERAGE GLUCOSE: 134 MG/DL
GLUCOSE SERPL-MCNC: 142 MG/DL
HBA1C MFR BLD HPLC: 6.3 %
HCT VFR BLD CALC: 41.7 %
HDLC SERPL-MCNC: 40 MG/DL
HGB BLD-MCNC: 12.4 G/DL
IMM GRANULOCYTES NFR BLD AUTO: 0.2 %
LDLC SERPL CALC-MCNC: 39 MG/DL
LYMPHOCYTES # BLD AUTO: 2.35 K/UL
LYMPHOCYTES NFR BLD AUTO: 29.2 %
MAN DIFF?: NORMAL
MCHC RBC-ENTMCNC: 23.1 PG
MCHC RBC-ENTMCNC: 29.7 GM/DL
MCV RBC AUTO: 77.7 FL
MICROALBUMIN 24H UR DL<=1MG/L-MCNC: 1.9 MG/DL
MICROALBUMIN/CREAT 24H UR-RTO: 32 MG/G
MONOCYTES # BLD AUTO: 0.87 K/UL
MONOCYTES NFR BLD AUTO: 10.8 %
NEUTROPHILS # BLD AUTO: 4.64 K/UL
NEUTROPHILS NFR BLD AUTO: 57.8 %
NONHDLC SERPL-MCNC: 47 MG/DL
NT-PROBNP SERPL-MCNC: 170 PG/ML
PLATELET # BLD AUTO: 391 K/UL
POTASSIUM SERPL-SCNC: 4.1 MMOL/L
PROT SERPL-MCNC: 7.2 G/DL
RBC # BLD: 5.37 M/UL
RBC # FLD: 20.4 %
SODIUM SERPL-SCNC: 138 MMOL/L
TRIGL SERPL-MCNC: 44 MG/DL
WBC # FLD AUTO: 8.04 K/UL

## 2022-02-23 ENCOUNTER — APPOINTMENT (OUTPATIENT)
Dept: CARDIOLOGY | Facility: CLINIC | Age: 72
End: 2022-02-23

## 2022-03-16 ENCOUNTER — APPOINTMENT (OUTPATIENT)
Dept: CARDIOLOGY | Facility: CLINIC | Age: 72
End: 2022-03-16
Payer: COMMERCIAL

## 2022-03-16 ENCOUNTER — NON-APPOINTMENT (OUTPATIENT)
Age: 72
End: 2022-03-16

## 2022-03-16 VITALS — BODY MASS INDEX: 22.04 KG/M2 | WEIGHT: 128.38 LBS

## 2022-03-16 VITALS
OXYGEN SATURATION: 99 % | DIASTOLIC BLOOD PRESSURE: 77 MMHG | BODY MASS INDEX: 20.57 KG/M2 | WEIGHT: 128 LBS | HEART RATE: 66 BPM | HEIGHT: 66 IN | SYSTOLIC BLOOD PRESSURE: 114 MMHG | TEMPERATURE: 98.5 F

## 2022-03-16 PROCEDURE — 93000 ELECTROCARDIOGRAM COMPLETE: CPT

## 2022-03-16 PROCEDURE — 99214 OFFICE O/P EST MOD 30 MIN: CPT

## 2022-03-16 NOTE — REVIEW OF SYSTEMS
[Fever] : no fever [Feeling Fatigued] : not feeling fatigued [Blurry Vision] : no blurred vision [Earache] : no earache [SOB] : no shortness of breath [Leg Claudication] : no intermittent leg claudication [Cough] : no cough [Abdominal Pain] : no abdominal pain [Joint Pain] : no joint pain [de-identified] : See HPI

## 2022-03-16 NOTE — HISTORY OF PRESENT ILLNESS
[FreeTextEntry1] : 71-year-old male history of hypertension, hyperlipidemia, type 2 diabetes.  Patient had NSTEMI in September 2021, found to have triple-vessel coronary artery disease underwent three-vessel bypass, LIMA to the LAD SVG to OM1 SVG to RPDA.  Ejection fraction.  Was normal.\par \par \par Patient denies chest pain, dyspnea, orthopnea, PND, edema, palpitations, syncope or presyncope. \par Patient is compliant with their medications.\par

## 2022-03-16 NOTE — DISCUSSION/SUMMARY
[FreeTextEntry1] : CAD, status post bypass surgery, doing well, considering he had NSTEMI we will continue with aspirin and clopidogrel and aggressive risk factor modification\par \par Hypertension, blood pressure is at goal, continue with losartan and metoprolol\par \par HLD: LDL 39. Will remain Atorvastatin. \par \par Type 2 diabetes his last A1c was 6.3, recommend continue with Metformin, follow-up with primary care physician

## 2022-03-16 NOTE — PHYSICAL EXAM
[Well Developed] : well developed [Normal Conjunctiva] : normal conjunctiva [Normal Venous Pressure] : normal venous pressure [Normal S1, S2] : normal S1, S2 [Normal] : no edema, no cyanosis, no clubbing, no varicosities [de-identified] : Well-healed surgical wound

## 2022-05-30 ENCOUNTER — RX RENEWAL (OUTPATIENT)
Age: 72
End: 2022-05-30

## 2022-07-12 RX ORDER — LANCETS 33 GAUGE
EACH MISCELLANEOUS
Qty: 90 | Refills: 0 | Status: ACTIVE | COMMUNITY
Start: 2022-07-12 | End: 1900-01-01

## 2022-07-12 RX ORDER — BLOOD-GLUCOSE METER
W/DEVICE KIT MISCELLANEOUS
Qty: 1 | Refills: 0 | Status: ACTIVE | COMMUNITY
Start: 2022-07-12 | End: 1900-01-01

## 2022-07-12 RX ORDER — BLOOD SUGAR DIAGNOSTIC
STRIP MISCELLANEOUS DAILY
Qty: 100 | Refills: 2 | Status: DISCONTINUED | COMMUNITY
Start: 2022-01-26 | End: 2022-07-12

## 2022-07-12 RX ORDER — LANCETS
EACH MISCELLANEOUS
Qty: 1 | Refills: 1 | Status: DISCONTINUED | COMMUNITY
Start: 2021-11-22 | End: 2022-07-12

## 2022-07-12 RX ORDER — LANCETS 33 GAUGE
EACH MISCELLANEOUS
Qty: 1 | Refills: 1 | Status: DISCONTINUED | COMMUNITY
Start: 2021-09-14 | End: 2022-07-12

## 2022-07-12 RX ORDER — BLOOD SUGAR DIAGNOSTIC
STRIP MISCELLANEOUS DAILY
Qty: 90 | Refills: 3 | Status: ACTIVE | COMMUNITY
Start: 2022-07-12 | End: 1900-01-01

## 2022-07-18 ENCOUNTER — APPOINTMENT (OUTPATIENT)
Dept: INTERNAL MEDICINE | Facility: CLINIC | Age: 72
End: 2022-07-18

## 2022-07-18 ENCOUNTER — LABORATORY RESULT (OUTPATIENT)
Age: 72
End: 2022-07-18

## 2022-07-18 VITALS
OXYGEN SATURATION: 97 % | HEART RATE: 71 BPM | BODY MASS INDEX: 18.48 KG/M2 | WEIGHT: 115 LBS | TEMPERATURE: 98 F | HEIGHT: 66 IN | SYSTOLIC BLOOD PRESSURE: 120 MMHG | DIASTOLIC BLOOD PRESSURE: 64 MMHG

## 2022-07-18 PROCEDURE — 36415 COLL VENOUS BLD VENIPUNCTURE: CPT

## 2022-07-18 PROCEDURE — 99214 OFFICE O/P EST MOD 30 MIN: CPT | Mod: 25

## 2022-07-18 RX ORDER — UBIDECARENONE/VIT E ACET 100MG-5
100 CAPSULE ORAL
Qty: 30 | Refills: 3 | Status: DISCONTINUED | COMMUNITY
Start: 2021-10-15 | End: 2022-07-18

## 2022-07-18 RX ORDER — ELECTROLYTES/DEXTROSE
SOLUTION, ORAL ORAL DAILY
Qty: 90 | Refills: 2 | Status: ACTIVE | COMMUNITY
Start: 2022-07-18 | End: 1900-01-01

## 2022-07-18 RX ORDER — LOSARTAN POTASSIUM 25 MG/1
25 TABLET, FILM COATED ORAL DAILY
Qty: 90 | Refills: 1 | Status: DISCONTINUED | COMMUNITY
Start: 2021-11-16 | End: 2022-07-18

## 2022-07-19 NOTE — PHYSICAL EXAM
[No Acute Distress] : no acute distress [Well Nourished] : well nourished [Well Developed] : well developed [Normal Sclera/Conjunctiva] : normal sclera/conjunctiva [EOMI] : extraocular movements intact [Normal Outer Ear/Nose] : the outer ears and nose were normal in appearance [Normal TMs] : both tympanic membranes were normal [No JVD] : no jugular venous distention [Supple] : supple [No Respiratory Distress] : no respiratory distress  [No Accessory Muscle Use] : no accessory muscle use [Clear to Auscultation] : lungs were clear to auscultation bilaterally [Normal Rate] : normal rate  [Regular Rhythm] : with a regular rhythm [Normal S1, S2] : normal S1 and S2 [No Murmur] : no murmur heard [Pedal Pulses Present] : the pedal pulses are present [No Edema] : there was no peripheral edema [No Extremity Clubbing/Cyanosis] : no extremity clubbing/cyanosis [Soft] : abdomen soft [Non Tender] : non-tender [Non-distended] : non-distended [Normal Bowel Sounds] : normal bowel sounds [Normal Posterior Cervical Nodes] : no posterior cervical lymphadenopathy [Normal Anterior Cervical Nodes] : no anterior cervical lymphadenopathy [No CVA Tenderness] : no CVA  tenderness [No Spinal Tenderness] : no spinal tenderness [No Joint Swelling] : no joint swelling [Grossly Normal Strength/Tone] : grossly normal strength/tone

## 2022-07-19 NOTE — HEALTH RISK ASSESSMENT
[0] : 2) Feeling down, depressed, or hopeless: Not at all (0) [PHQ-2 Negative - No further assessment needed] : PHQ-2 Negative - No further assessment needed [[id number]] : 0

## 2022-07-19 NOTE — ASSESSMENT
[FreeTextEntry1] : 1) HM- Encouraged healthy meals and snacks, and  physical activity as tolerated for weight reduction/maintenance. UTD with vaccine. Lab ordered as below  Orders and referral provided as below.\par \par All patient questions answered today and understood by patient. Patient to follow up if new symptoms, questions, renewals or health concerns.

## 2022-07-19 NOTE — HISTORY OF PRESENT ILLNESS
[FreeTextEntry1] : Follow up  [de-identified] : Mr. MAEVE JARVIS is a 71 year old man with pmhx of T2DM, HTN, HLD, 3v CAD s/p CABG x 3 LIMA, Normal LV function, Anemia who presents for a follow up.\par \par He has lost significant weight, about 25 pounds in the past year.  He eats fruits, vegetables, eggs, chicken and fish. He has been avoiding sweet things. He ate rice two weeks ago.\par \par He brought a log of blood pressure at home, lower side of normal. HR was in the normal range while on metoprolol. Advised stopping losartan. \par \par He will have blood work today to assess stopping metformin and lowering atorvastatin. \par \par We discussed insufficient calorie intake and multivitamin dosing. Nutritionist and GI referral provided today.

## 2022-07-21 LAB
ALBUMIN SERPL ELPH-MCNC: 4.5 G/DL
ALP BLD-CCNC: 61 U/L
ALT SERPL-CCNC: 25 U/L
ANION GAP SERPL CALC-SCNC: 9 MMOL/L
AST SERPL-CCNC: 23 U/L
BASOPHILS # BLD AUTO: 0.04 K/UL
BASOPHILS NFR BLD AUTO: 0.6 %
BILIRUB SERPL-MCNC: 1.8 MG/DL
BUN SERPL-MCNC: 16 MG/DL
CALCIUM SERPL-MCNC: 9.6 MG/DL
CHLORIDE SERPL-SCNC: 99 MMOL/L
CHOLEST SERPL-MCNC: 87 MG/DL
CO2 SERPL-SCNC: 27 MMOL/L
CREAT SERPL-MCNC: 1.26 MG/DL
CRP SERPL-MCNC: <3 MG/L
EGFR: 60 ML/MIN/1.73M2
EOSINOPHIL # BLD AUTO: 0.18 K/UL
EOSINOPHIL NFR BLD AUTO: 2.7 %
ESTIMATED AVERAGE GLUCOSE: 134 MG/DL
GLUCOSE SERPL-MCNC: 161 MG/DL
HBA1C MFR BLD HPLC: 6.3 %
HCT VFR BLD CALC: 42.8 %
HDLC SERPL-MCNC: 49 MG/DL
HGB BLD-MCNC: 13.7 G/DL
IMM GRANULOCYTES NFR BLD AUTO: 0.2 %
INSULIN SERPL-MCNC: 25.9 UU/ML
IRON SATN MFR SERPL: 29 %
IRON SERPL-MCNC: 73 UG/DL
LDH SERPL-CCNC: 150 U/L
LDLC SERPL CALC-MCNC: 27 MG/DL
LYMPHOCYTES # BLD AUTO: 2.36 K/UL
LYMPHOCYTES NFR BLD AUTO: 35.8 %
MAN DIFF?: NORMAL
MCHC RBC-ENTMCNC: 25.7 PG
MCHC RBC-ENTMCNC: 32 GM/DL
MCV RBC AUTO: 80.3 FL
MONOCYTES # BLD AUTO: 0.48 K/UL
MONOCYTES NFR BLD AUTO: 7.3 %
NEUTROPHILS # BLD AUTO: 3.52 K/UL
NEUTROPHILS NFR BLD AUTO: 53.4 %
NONHDLC SERPL-MCNC: 38 MG/DL
PLATELET # BLD AUTO: 231 K/UL
POTASSIUM SERPL-SCNC: 4.5 MMOL/L
PROT SERPL-MCNC: 7.1 G/DL
RBC # BLD: 5.33 M/UL
RBC # BLD: 5.33 M/UL
RBC # FLD: 18.3 %
RETICS # AUTO: 0.8 %
RETICS AGGREG/RBC NFR: 43.1 K/UL
SODIUM SERPL-SCNC: 135 MMOL/L
TIBC SERPL-MCNC: 248 UG/DL
TRIGL SERPL-MCNC: 57 MG/DL
UIBC SERPL-MCNC: 175 UG/DL
WBC # FLD AUTO: 6.59 K/UL

## 2022-07-26 ENCOUNTER — NON-APPOINTMENT (OUTPATIENT)
Age: 72
End: 2022-07-26

## 2022-08-19 ENCOUNTER — RX RENEWAL (OUTPATIENT)
Age: 72
End: 2022-08-19

## 2022-09-21 ENCOUNTER — APPOINTMENT (OUTPATIENT)
Dept: CARDIOLOGY | Facility: CLINIC | Age: 72
End: 2022-09-21

## 2022-09-21 VITALS
WEIGHT: 116 LBS | OXYGEN SATURATION: 99 % | HEART RATE: 50 BPM | BODY MASS INDEX: 18.64 KG/M2 | DIASTOLIC BLOOD PRESSURE: 81 MMHG | HEIGHT: 66 IN | SYSTOLIC BLOOD PRESSURE: 120 MMHG

## 2022-09-21 PROCEDURE — 93000 ELECTROCARDIOGRAM COMPLETE: CPT

## 2022-09-21 PROCEDURE — 99214 OFFICE O/P EST MOD 30 MIN: CPT | Mod: 25

## 2022-09-21 RX ORDER — ASPIRIN ENTERIC COATED TABLETS 81 MG 81 MG/1
81 TABLET, DELAYED RELEASE ORAL
Qty: 90 | Refills: 2 | Status: DISCONTINUED | COMMUNITY
Start: 2021-09-15 | End: 2022-09-21

## 2022-09-30 NOTE — PHYSICAL EXAM
[Well Developed] : well developed [Normal Conjunctiva] : normal conjunctiva [Normal Venous Pressure] : normal venous pressure [Normal S1, S2] : normal S1, S2 [Normal] : no edema, no cyanosis, no clubbing, no varicosities [de-identified] : Well-healed surgical wound

## 2022-09-30 NOTE — HISTORY OF PRESENT ILLNESS
[FreeTextEntry1] : 72-year-old male history of hypertension, hyperlipidemia, type 2 diabetes.  Patient had NSTEMI in September 2021, found to have triple-vessel coronary artery disease underwent three-vessel bypass, LIMA to the LAD SVG to OM1 SVG to RPDA.  Ejection fraction.  Was normal.\par Currently feels better denies any chest pain shortness of breath palpitations syncope or presyncope

## 2022-09-30 NOTE — REVIEW OF SYSTEMS
[Fever] : no fever [Feeling Fatigued] : not feeling fatigued [Blurry Vision] : no blurred vision [Earache] : no earache [SOB] : no shortness of breath [Leg Claudication] : no intermittent leg claudication [Cough] : no cough [Abdominal Pain] : no abdominal pain [Joint Pain] : no joint pain [de-identified] : See HPI

## 2022-09-30 NOTE — DISCUSSION/SUMMARY
[FreeTextEntry1] : CAD, status post bypass surgery, doing well, considering he had NSTEMI we will continue with aspirin and clopidogrel and aggressive risk factor modification\par \par Hypertension, blood pressure is at goal, continue with losartan and metoprolol\par \par HLD: LDL 39. Will remain Atorvastatin. \par  [EKG obtained to assist in diagnosis and management of assessed problem(s)] : EKG obtained to assist in diagnosis and management of assessed problem(s)

## 2022-10-12 NOTE — H&P ADULT - PROBLEM/PLAN-1
none known
DISPLAY PLAN FREE TEXT

## 2022-10-21 ENCOUNTER — LABORATORY RESULT (OUTPATIENT)
Age: 72
End: 2022-10-21

## 2022-10-21 ENCOUNTER — APPOINTMENT (OUTPATIENT)
Dept: INTERNAL MEDICINE | Facility: CLINIC | Age: 72
End: 2022-10-21

## 2022-10-21 VITALS
DIASTOLIC BLOOD PRESSURE: 76 MMHG | SYSTOLIC BLOOD PRESSURE: 124 MMHG | BODY MASS INDEX: 19.9 KG/M2 | WEIGHT: 118 LBS | HEART RATE: 63 BPM | HEIGHT: 64.5 IN | OXYGEN SATURATION: 99 % | TEMPERATURE: 97.2 F

## 2022-10-21 DIAGNOSIS — R74.01 ELEVATION OF LEVELS OF LIVER TRANSAMINASE LEVELS: ICD-10-CM

## 2022-10-21 DIAGNOSIS — R82.90 UNSPECIFIED ABNORMAL FINDINGS IN URINE: ICD-10-CM

## 2022-10-21 DIAGNOSIS — Z86.2 PERSONAL HISTORY OF DISEASES OF THE BLOOD AND BLOOD-FORMING ORGANS AND CERTAIN DISORDERS INVOLVING THE IMMUNE MECHANISM: ICD-10-CM

## 2022-10-21 DIAGNOSIS — Z87.898 PERSONAL HISTORY OF OTHER SPECIFIED CONDITIONS: ICD-10-CM

## 2022-10-21 DIAGNOSIS — Z86.39 PERSONAL HISTORY OF OTHER ENDOCRINE, NUTRITIONAL AND METABOLIC DISEASE: ICD-10-CM

## 2022-10-21 DIAGNOSIS — K21.9 GASTRO-ESOPHAGEAL REFLUX DISEASE W/OUT ESOPHAGITIS: ICD-10-CM

## 2022-10-21 DIAGNOSIS — Z00.00 ENCOUNTER FOR GENERAL ADULT MEDICAL EXAMINATION W/OUT ABNORMAL FINDINGS: ICD-10-CM

## 2022-10-21 PROCEDURE — G0444 DEPRESSION SCREEN ANNUAL: CPT

## 2022-10-21 PROCEDURE — G0439: CPT

## 2022-10-21 PROCEDURE — 36415 COLL VENOUS BLD VENIPUNCTURE: CPT

## 2022-10-21 RX ORDER — FAMOTIDINE 20 MG/1
20 TABLET, FILM COATED ORAL
Qty: 90 | Refills: 2 | Status: DISCONTINUED | COMMUNITY
Start: 2021-10-15 | End: 2022-10-21

## 2022-10-21 NOTE — HISTORY OF PRESENT ILLNESS
[FreeTextEntry1] : Annual wellness visit  [de-identified] : Mr. MAEVE JARVIS is a 72 year old man with pmhx of T2DM, 3v CAD s/p CABG x 3 LIMA, Normal LV function, hx of Anemia who presents for annual wellness visit. He endorses being in good health and mood. Has no complaints. He stopped plavix. He sugars has been well controlled at home. He declines vaccine and colonoscopy. Will go for repeat FOBT.

## 2022-10-21 NOTE — ASSESSMENT
[FreeTextEntry1] : 1) HCM- Encouraged increase food intake and regular physical activitty. Labs ordered as below. He declined all vaccination. Health care proxy form provided today. Referral provided as below. \par \par 2) CAD - Hx of NSTEMI. s/p CABG x 3. C/w aspirin, statin and beta blocker. Follow up with cardiology. \par \par All patient questions answered today and understood by patient. Henceforth, Patient to schedule follow up if new symptoms, questions, renewals or health concerns.

## 2022-10-24 LAB
ALBUMIN SERPL ELPH-MCNC: 4.5 G/DL
ALP BLD-CCNC: 72 U/L
ALT SERPL-CCNC: 41 U/L
ANION GAP SERPL CALC-SCNC: 13 MMOL/L
APPEARANCE: CLEAR
AST SERPL-CCNC: 38 U/L
BACTERIA: NEGATIVE
BASOPHILS # BLD AUTO: 0.04 K/UL
BASOPHILS NFR BLD AUTO: 0.5 %
BILIRUB SERPL-MCNC: 1.7 MG/DL
BILIRUBIN URINE: NEGATIVE
BLOOD URINE: NEGATIVE
BUN SERPL-MCNC: 19 MG/DL
CALCIUM SERPL-MCNC: 9.8 MG/DL
CHLORIDE SERPL-SCNC: 101 MMOL/L
CHOLEST SERPL-MCNC: 91 MG/DL
CO2 SERPL-SCNC: 24 MMOL/L
COLOR: YELLOW
CREAT SERPL-MCNC: 1.1 MG/DL
EGFR: 70 ML/MIN/1.73M2
EOSINOPHIL # BLD AUTO: 0.16 K/UL
EOSINOPHIL NFR BLD AUTO: 2 %
ESTIMATED AVERAGE GLUCOSE: 123 MG/DL
GLUCOSE QUALITATIVE U: NEGATIVE
GLUCOSE SERPL-MCNC: 123 MG/DL
HBA1C MFR BLD HPLC: 5.9 %
HCT VFR BLD CALC: 43.7 %
HDLC SERPL-MCNC: 55 MG/DL
HEMOCCULT STL QL IA: NEGATIVE
HGB BLD-MCNC: 13.9 G/DL
HYALINE CASTS: 0 /LPF
IMM GRANULOCYTES NFR BLD AUTO: 0.2 %
KETONES URINE: NEGATIVE
LDLC SERPL CALC-MCNC: 27 MG/DL
LEUKOCYTE ESTERASE URINE: NEGATIVE
LYMPHOCYTES # BLD AUTO: 2.89 K/UL
LYMPHOCYTES NFR BLD AUTO: 35.7 %
MAN DIFF?: NORMAL
MCHC RBC-ENTMCNC: 26.2 PG
MCHC RBC-ENTMCNC: 31.8 GM/DL
MCV RBC AUTO: 82.5 FL
MICROSCOPIC-UA: NORMAL
MONOCYTES # BLD AUTO: 0.74 K/UL
MONOCYTES NFR BLD AUTO: 9.1 %
NEUTROPHILS # BLD AUTO: 4.25 K/UL
NEUTROPHILS NFR BLD AUTO: 52.5 %
NITRITE URINE: NEGATIVE
NONHDLC SERPL-MCNC: 36 MG/DL
PH URINE: 6.5
PLATELET # BLD AUTO: 265 K/UL
POTASSIUM SERPL-SCNC: 5 MMOL/L
PROT SERPL-MCNC: 7.2 G/DL
PROTEIN URINE: NEGATIVE
PSA SERPL-MCNC: 0.26 NG/ML
RBC # BLD: 5.3 M/UL
RBC # FLD: 15.4 %
RED BLOOD CELLS URINE: 1 /HPF
SODIUM SERPL-SCNC: 139 MMOL/L
SPECIFIC GRAVITY URINE: 1.01
SQUAMOUS EPITHELIAL CELLS: 0 /HPF
THYROGLOB AB SERPL-ACNC: <20 IU/ML
THYROPEROXIDASE AB SERPL IA-ACNC: 277 IU/ML
TRIGL SERPL-MCNC: 47 MG/DL
TSH SERPL-ACNC: 5.99 UIU/ML
UROBILINOGEN URINE: NORMAL
WBC # FLD AUTO: 8.1 K/UL
WHITE BLOOD CELLS URINE: 0 /HPF

## 2022-10-27 RX ORDER — METOPROLOL TARTRATE 25 MG/1
25 TABLET, FILM COATED ORAL TWICE DAILY
Qty: 180 | Refills: 2 | Status: DISCONTINUED | COMMUNITY
Start: 2021-09-15 | End: 2022-10-27

## 2022-11-25 ENCOUNTER — RX RENEWAL (OUTPATIENT)
Age: 72
End: 2022-11-25

## 2022-12-07 ENCOUNTER — RX RENEWAL (OUTPATIENT)
Age: 72
End: 2022-12-07

## 2022-12-08 ENCOUNTER — APPOINTMENT (OUTPATIENT)
Dept: INTERNAL MEDICINE | Facility: CLINIC | Age: 72
End: 2022-12-08

## 2022-12-08 ENCOUNTER — LABORATORY RESULT (OUTPATIENT)
Age: 72
End: 2022-12-08

## 2022-12-08 VITALS
HEIGHT: 64.5 IN | OXYGEN SATURATION: 98 % | WEIGHT: 122 LBS | TEMPERATURE: 98.1 F | BODY MASS INDEX: 20.58 KG/M2 | SYSTOLIC BLOOD PRESSURE: 130 MMHG | DIASTOLIC BLOOD PRESSURE: 76 MMHG | HEART RATE: 71 BPM

## 2022-12-08 DIAGNOSIS — Z12.11 ENCOUNTER FOR SCREENING FOR MALIGNANT NEOPLASM OF COLON: ICD-10-CM

## 2022-12-08 DIAGNOSIS — Z12.12 ENCOUNTER FOR SCREENING FOR MALIGNANT NEOPLASM OF COLON: ICD-10-CM

## 2022-12-08 DIAGNOSIS — R79.89 OTHER SPECIFIED ABNORMAL FINDINGS OF BLOOD CHEMISTRY: ICD-10-CM

## 2022-12-08 PROCEDURE — 99214 OFFICE O/P EST MOD 30 MIN: CPT | Mod: 25

## 2022-12-08 RX ORDER — MULTIVIT-MIN/FA/LYCOPEN/LUTEIN .4-300-25
TABLET ORAL DAILY
Qty: 90 | Refills: 0 | Status: ACTIVE | COMMUNITY
Start: 2021-10-16 | End: 1900-01-01

## 2022-12-08 NOTE — HEALTH RISK ASSESSMENT
[No falls in past year] : Patient reported no falls in the past year [0] : 2) Feeling down, depressed, or hopeless: Not at all (0) [PHQ-2 Negative - No further assessment needed] : PHQ-2 Negative - No further assessment needed [[id number]] : 0

## 2022-12-08 NOTE — HISTORY OF PRESENT ILLNESS
[FreeTextEntry1] : Follow up  [de-identified] : Mr. MAEVE JARVIS is a 72 year old man with pmhx of T2DM, 3v CAD s/p CABG x 3 LIMA, Normal LV function, hx of Anemia w/ normal FOBT who presents for a follow up. He walks half an hours a day, no pulm or cardiac complaints. He is taking medications as prescribed. He was working at [employer]. 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 He needs forms filled out today. Adivsed patient he had normal heart function on echo. He may return to work. Patient to complete form and submit.

## 2022-12-08 NOTE — PHYSICAL EXAM
[No Acute Distress] : no acute distress [Well Nourished] : well nourished [Well Developed] : well developed [Normal Sclera/Conjunctiva] : normal sclera/conjunctiva [PERRL] : pupils equal round and reactive to light [EOMI] : extraocular movements intact [Normal Outer Ear/Nose] : the outer ears and nose were normal in appearance [Normal Oropharynx] : the oropharynx was normal [No JVD] : no jugular venous distention [No Lymphadenopathy] : no lymphadenopathy [No Respiratory Distress] : no respiratory distress  [No Accessory Muscle Use] : no accessory muscle use [Clear to Auscultation] : lungs were clear to auscultation bilaterally [Normal S1, S2] : normal S1 and S2 [Pedal Pulses Present] : the pedal pulses are present [No Edema] : there was no peripheral edema [No Palpable Aorta] : no palpable aorta [No Extremity Clubbing/Cyanosis] : no extremity clubbing/cyanosis

## 2022-12-13 LAB
ALBUMIN SERPL ELPH-MCNC: 4.3 G/DL
ALP BLD-CCNC: 75 U/L
ALT SERPL-CCNC: 35 U/L
ANION GAP SERPL CALC-SCNC: 10 MMOL/L
AST SERPL-CCNC: 31 U/L
BILIRUB SERPL-MCNC: 1.5 MG/DL
BUN SERPL-MCNC: 19 MG/DL
CALCIUM SERPL-MCNC: 9.3 MG/DL
CHLORIDE SERPL-SCNC: 104 MMOL/L
CO2 SERPL-SCNC: 25 MMOL/L
CREAT SERPL-MCNC: 1.19 MG/DL
EGFR: 64 ML/MIN/1.73M2
GLUCOSE SERPL-MCNC: 96 MG/DL
POTASSIUM SERPL-SCNC: 5.2 MMOL/L
PROT SERPL-MCNC: 7.2 G/DL
SODIUM SERPL-SCNC: 139 MMOL/L
TSH SERPL-ACNC: 4.9 UIU/ML

## 2022-12-15 ENCOUNTER — RX RENEWAL (OUTPATIENT)
Age: 72
End: 2022-12-15

## 2023-03-22 ENCOUNTER — NON-APPOINTMENT (OUTPATIENT)
Age: 73
End: 2023-03-22

## 2023-03-22 ENCOUNTER — APPOINTMENT (OUTPATIENT)
Dept: CARDIOLOGY | Facility: CLINIC | Age: 73
End: 2023-03-22
Payer: MEDICARE

## 2023-03-22 VITALS
OXYGEN SATURATION: 98 % | HEIGHT: 64.5 IN | WEIGHT: 117 LBS | BODY MASS INDEX: 19.73 KG/M2 | SYSTOLIC BLOOD PRESSURE: 147 MMHG | DIASTOLIC BLOOD PRESSURE: 76 MMHG | HEART RATE: 62 BPM

## 2023-03-22 PROCEDURE — 93000 ELECTROCARDIOGRAM COMPLETE: CPT

## 2023-03-22 PROCEDURE — 99214 OFFICE O/P EST MOD 30 MIN: CPT | Mod: 25

## 2023-03-22 RX ORDER — CLOPIDOGREL BISULFATE 75 MG/1
75 TABLET, FILM COATED ORAL
Qty: 90 | Refills: 2 | Status: DISCONTINUED | COMMUNITY
Start: 2021-09-15 | End: 2023-03-22

## 2023-03-22 NOTE — PHYSICAL EXAM
[Well Developed] : well developed [Normal Conjunctiva] : normal conjunctiva [Normal Venous Pressure] : normal venous pressure [Normal S1, S2] : normal S1, S2 [Normal] : no edema, no cyanosis, no clubbing, no varicosities [de-identified] : Well-healed surgical wound

## 2023-03-22 NOTE — REVIEW OF SYSTEMS
[Fever] : no fever [Feeling Fatigued] : not feeling fatigued [Blurry Vision] : no blurred vision [Earache] : no earache [SOB] : no shortness of breath [Leg Claudication] : no intermittent leg claudication [Cough] : no cough [Abdominal Pain] : no abdominal pain [Joint Pain] : no joint pain [de-identified] : See HPI

## 2023-03-22 NOTE — DISCUSSION/SUMMARY
[FreeTextEntry1] : CAD, status post bypass surgery, doing well, considering he had NSTEMI we will continue with aspirin and aggressive risk factor modification\par \par Hypertension, blood pressure is at goal, continue with metoprolol\par \par HLD: LDL 39. Will remain Atorvastatin. \par  [EKG obtained to assist in diagnosis and management of assessed problem(s)] : EKG obtained to assist in diagnosis and management of assessed problem(s)

## 2023-05-01 ENCOUNTER — RX RENEWAL (OUTPATIENT)
Age: 73
End: 2023-05-01

## 2023-05-25 ENCOUNTER — INPATIENT (INPATIENT)
Facility: HOSPITAL | Age: 73
LOS: 4 days | Discharge: HOME | End: 2023-05-30
Attending: STUDENT IN AN ORGANIZED HEALTH CARE EDUCATION/TRAINING PROGRAM | Admitting: STUDENT IN AN ORGANIZED HEALTH CARE EDUCATION/TRAINING PROGRAM
Payer: MEDICARE

## 2023-05-25 VITALS
SYSTOLIC BLOOD PRESSURE: 149 MMHG | OXYGEN SATURATION: 100 % | TEMPERATURE: 98 F | HEART RATE: 68 BPM | RESPIRATION RATE: 16 BRPM | DIASTOLIC BLOOD PRESSURE: 88 MMHG

## 2023-05-25 DIAGNOSIS — Z98.49 CATARACT EXTRACTION STATUS, UNSPECIFIED EYE: Chronic | ICD-10-CM

## 2023-05-25 PROCEDURE — 99291 CRITICAL CARE FIRST HOUR: CPT

## 2023-05-25 NOTE — ED ADULT TRIAGE NOTE - CHIEF COMPLAINT QUOTE
Pt c/o dec PO intake  and not feeling him self. per family around 3pm they notices his speech was slurred and was taking longer to respond and hard to get a sentence together. per family slurred speech resolved but still slow to respond. No facial droop or slurred speech noted. Pt has equal strength, motor, and sensation to bilateral upper and lower extremities. No drifts noted to bilateral upper and lower extremities. No complaints of chest pain, headache, nausea, dizziness, vomiting  SOB, fever, chills verbalized. Pt c/o dec PO intake  and not feeling him self. per family around 3pm they notices his speech was slurred and was taking longer to respond and hard to get a sentence together. per family slurred speech resolved but still slow to respond. No facial droop or slurred speech noted. Pt has equal strength, motor, and sensation to bilateral upper and lower extremities. No drifts noted to bilateral upper and lower extremities. No complaints of chest pain, headache, nausea, dizziness, vomiting  SOB, fever, chills verbalized. pt evaluated by Dr vásquez and no code stroke called.

## 2023-05-26 DIAGNOSIS — I10 ESSENTIAL (PRIMARY) HYPERTENSION: ICD-10-CM

## 2023-05-26 DIAGNOSIS — E03.9 HYPOTHYROIDISM, UNSPECIFIED: ICD-10-CM

## 2023-05-26 DIAGNOSIS — I63.9 CEREBRAL INFARCTION, UNSPECIFIED: ICD-10-CM

## 2023-05-26 DIAGNOSIS — E11.9 TYPE 2 DIABETES MELLITUS WITHOUT COMPLICATIONS: ICD-10-CM

## 2023-05-26 DIAGNOSIS — R21 RASH AND OTHER NONSPECIFIC SKIN ERUPTION: ICD-10-CM

## 2023-05-26 DIAGNOSIS — R47.81 SLURRED SPEECH: ICD-10-CM

## 2023-05-26 DIAGNOSIS — I25.10 ATHEROSCLEROTIC HEART DISEASE OF NATIVE CORONARY ARTERY WITHOUT ANGINA PECTORIS: ICD-10-CM

## 2023-05-26 DIAGNOSIS — Z29.9 ENCOUNTER FOR PROPHYLACTIC MEASURES, UNSPECIFIED: ICD-10-CM

## 2023-05-26 LAB
ALBUMIN SERPL ELPH-MCNC: 4 G/DL — SIGNIFICANT CHANGE UP (ref 3.3–5)
ALP SERPL-CCNC: 66 U/L — SIGNIFICANT CHANGE UP (ref 40–120)
ALT FLD-CCNC: 24 U/L — SIGNIFICANT CHANGE UP (ref 4–41)
ANION GAP SERPL CALC-SCNC: 12 MMOL/L — SIGNIFICANT CHANGE UP (ref 7–14)
APTT BLD: 31.3 SEC — SIGNIFICANT CHANGE UP (ref 27–36.3)
AST SERPL-CCNC: 31 U/L — SIGNIFICANT CHANGE UP (ref 4–40)
BASOPHILS # BLD AUTO: 0.05 K/UL — SIGNIFICANT CHANGE UP (ref 0–0.2)
BASOPHILS NFR BLD AUTO: 0.6 % — SIGNIFICANT CHANGE UP (ref 0–2)
BILIRUB SERPL-MCNC: 1.4 MG/DL — HIGH (ref 0.2–1.2)
BUN SERPL-MCNC: 19 MG/DL — SIGNIFICANT CHANGE UP (ref 7–23)
CALCIUM SERPL-MCNC: 9.1 MG/DL — SIGNIFICANT CHANGE UP (ref 8.4–10.5)
CHLORIDE SERPL-SCNC: 99 MMOL/L — SIGNIFICANT CHANGE UP (ref 98–107)
CO2 SERPL-SCNC: 24 MMOL/L — SIGNIFICANT CHANGE UP (ref 22–31)
CREAT SERPL-MCNC: 1.13 MG/DL — SIGNIFICANT CHANGE UP (ref 0.5–1.3)
EGFR: 68 ML/MIN/1.73M2 — SIGNIFICANT CHANGE UP
EOSINOPHIL # BLD AUTO: 0.06 K/UL — SIGNIFICANT CHANGE UP (ref 0–0.5)
EOSINOPHIL NFR BLD AUTO: 0.8 % — SIGNIFICANT CHANGE UP (ref 0–6)
GLUCOSE SERPL-MCNC: 102 MG/DL — HIGH (ref 70–99)
HCT VFR BLD CALC: 42.9 % — SIGNIFICANT CHANGE UP (ref 39–50)
HGB BLD-MCNC: 13.9 G/DL — SIGNIFICANT CHANGE UP (ref 13–17)
IANC: 2.86 K/UL — SIGNIFICANT CHANGE UP (ref 1.8–7.4)
IMM GRANULOCYTES NFR BLD AUTO: 0.1 % — SIGNIFICANT CHANGE UP (ref 0–0.9)
INR BLD: 1.2 RATIO — HIGH (ref 0.88–1.16)
LYMPHOCYTES # BLD AUTO: 3.89 K/UL — HIGH (ref 1–3.3)
LYMPHOCYTES # BLD AUTO: 49.2 % — HIGH (ref 13–44)
MCHC RBC-ENTMCNC: 25.7 PG — LOW (ref 27–34)
MCHC RBC-ENTMCNC: 32.4 GM/DL — SIGNIFICANT CHANGE UP (ref 32–36)
MCV RBC AUTO: 79.4 FL — LOW (ref 80–100)
MONOCYTES # BLD AUTO: 1.03 K/UL — HIGH (ref 0–0.9)
MONOCYTES NFR BLD AUTO: 13 % — SIGNIFICANT CHANGE UP (ref 2–14)
NEUTROPHILS # BLD AUTO: 2.86 K/UL — SIGNIFICANT CHANGE UP (ref 1.8–7.4)
NEUTROPHILS NFR BLD AUTO: 36.3 % — LOW (ref 43–77)
NRBC # BLD: 0 /100 WBCS — SIGNIFICANT CHANGE UP (ref 0–0)
NRBC # FLD: 0 K/UL — SIGNIFICANT CHANGE UP (ref 0–0)
PLATELET # BLD AUTO: 226 K/UL — SIGNIFICANT CHANGE UP (ref 150–400)
POTASSIUM SERPL-MCNC: 4.8 MMOL/L — SIGNIFICANT CHANGE UP (ref 3.5–5.3)
POTASSIUM SERPL-SCNC: 4.8 MMOL/L — SIGNIFICANT CHANGE UP (ref 3.5–5.3)
PROT SERPL-MCNC: 7.6 G/DL — SIGNIFICANT CHANGE UP (ref 6–8.3)
PROTHROM AB SERPL-ACNC: 14 SEC — HIGH (ref 10.5–13.4)
RBC # BLD: 5.4 M/UL — SIGNIFICANT CHANGE UP (ref 4.2–5.8)
RBC # FLD: 14.4 % — SIGNIFICANT CHANGE UP (ref 10.3–14.5)
SODIUM SERPL-SCNC: 135 MMOL/L — SIGNIFICANT CHANGE UP (ref 135–145)
TROPONIN T, HIGH SENSITIVITY RESULT: 11 NG/L — SIGNIFICANT CHANGE UP
WBC # BLD: 7.9 K/UL — SIGNIFICANT CHANGE UP (ref 3.8–10.5)
WBC # FLD AUTO: 7.9 K/UL — SIGNIFICANT CHANGE UP (ref 3.8–10.5)

## 2023-05-26 PROCEDURE — 0042T: CPT | Mod: MA

## 2023-05-26 PROCEDURE — 99223 1ST HOSP IP/OBS HIGH 75: CPT

## 2023-05-26 PROCEDURE — 93306 TTE W/DOPPLER COMPLETE: CPT | Mod: 26

## 2023-05-26 PROCEDURE — 70498 CT ANGIOGRAPHY NECK: CPT | Mod: 26,MA

## 2023-05-26 PROCEDURE — 71045 X-RAY EXAM CHEST 1 VIEW: CPT | Mod: 26

## 2023-05-26 PROCEDURE — 70450 CT HEAD/BRAIN W/O DYE: CPT | Mod: 26,MA,59

## 2023-05-26 PROCEDURE — 70496 CT ANGIOGRAPHY HEAD: CPT | Mod: 26,MA

## 2023-05-26 PROCEDURE — 93970 EXTREMITY STUDY: CPT | Mod: 26

## 2023-05-26 RX ORDER — METOPROLOL TARTRATE 50 MG
25 TABLET ORAL
Refills: 0 | Status: DISCONTINUED | OUTPATIENT
Start: 2023-05-26 | End: 2023-05-26

## 2023-05-26 RX ORDER — SODIUM CHLORIDE 9 MG/ML
1000 INJECTION, SOLUTION INTRAVENOUS
Refills: 0 | Status: DISCONTINUED | OUTPATIENT
Start: 2023-05-26 | End: 2023-05-30

## 2023-05-26 RX ORDER — DEXTROSE 50 % IN WATER 50 %
12.5 SYRINGE (ML) INTRAVENOUS ONCE
Refills: 0 | Status: DISCONTINUED | OUTPATIENT
Start: 2023-05-26 | End: 2023-05-30

## 2023-05-26 RX ORDER — ENOXAPARIN SODIUM 100 MG/ML
40 INJECTION SUBCUTANEOUS EVERY 24 HOURS
Refills: 0 | Status: DISCONTINUED | OUTPATIENT
Start: 2023-05-26 | End: 2023-05-30

## 2023-05-26 RX ORDER — DEXTROSE 50 % IN WATER 50 %
15 SYRINGE (ML) INTRAVENOUS ONCE
Refills: 0 | Status: DISCONTINUED | OUTPATIENT
Start: 2023-05-26 | End: 2023-05-30

## 2023-05-26 RX ORDER — INSULIN LISPRO 100/ML
VIAL (ML) SUBCUTANEOUS AT BEDTIME
Refills: 0 | Status: DISCONTINUED | OUTPATIENT
Start: 2023-05-26 | End: 2023-05-30

## 2023-05-26 RX ORDER — CLOPIDOGREL BISULFATE 75 MG/1
75 TABLET, FILM COATED ORAL DAILY
Refills: 0 | Status: DISCONTINUED | OUTPATIENT
Start: 2023-05-26 | End: 2023-05-30

## 2023-05-26 RX ORDER — ATORVASTATIN CALCIUM 80 MG/1
80 TABLET, FILM COATED ORAL AT BEDTIME
Refills: 0 | Status: DISCONTINUED | OUTPATIENT
Start: 2023-05-26 | End: 2023-05-30

## 2023-05-26 RX ORDER — LEVOTHYROXINE SODIUM 125 MCG
1 TABLET ORAL
Refills: 0 | DISCHARGE

## 2023-05-26 RX ORDER — DEXTROSE 50 % IN WATER 50 %
25 SYRINGE (ML) INTRAVENOUS ONCE
Refills: 0 | Status: DISCONTINUED | OUTPATIENT
Start: 2023-05-26 | End: 2023-05-30

## 2023-05-26 RX ORDER — GLUCAGON INJECTION, SOLUTION 0.5 MG/.1ML
1 INJECTION, SOLUTION SUBCUTANEOUS ONCE
Refills: 0 | Status: DISCONTINUED | OUTPATIENT
Start: 2023-05-26 | End: 2023-05-30

## 2023-05-26 RX ORDER — ASPIRIN/CALCIUM CARB/MAGNESIUM 324 MG
81 TABLET ORAL DAILY
Refills: 0 | Status: DISCONTINUED | OUTPATIENT
Start: 2023-05-26 | End: 2023-05-30

## 2023-05-26 RX ORDER — INSULIN LISPRO 100/ML
VIAL (ML) SUBCUTANEOUS
Refills: 0 | Status: DISCONTINUED | OUTPATIENT
Start: 2023-05-26 | End: 2023-05-30

## 2023-05-26 RX ORDER — LEVOTHYROXINE SODIUM 125 MCG
25 TABLET ORAL DAILY
Refills: 0 | Status: DISCONTINUED | OUTPATIENT
Start: 2023-05-26 | End: 2023-05-30

## 2023-05-26 RX ADMIN — Medication 81 MILLIGRAM(S): at 12:58

## 2023-05-26 RX ADMIN — Medication 1: at 13:29

## 2023-05-26 RX ADMIN — CLOPIDOGREL BISULFATE 75 MILLIGRAM(S): 75 TABLET, FILM COATED ORAL at 12:57

## 2023-05-26 RX ADMIN — ATORVASTATIN CALCIUM 80 MILLIGRAM(S): 80 TABLET, FILM COATED ORAL at 21:56

## 2023-05-26 NOTE — ED ADULT NURSE NOTE - OBJECTIVE STATEMENT
Chas RN: Pt arrives to ED CT area as CODE STROKE. Pt family noticed that the speech was delayed and slurred, pt has difficulty finding words when they saw the pt at approx 1500 yesterday, last known normal was Wednesday night. Upon arrival to CT area did not have any facial droop and BUE/BLE strong. Daughter at bedside endorsed that pt speech is no longer slurred. Pt denies falls or head trauma. Respirations are even and unlabored. 18G placed to [facility], labs drawn and sent. Primary RN notified, pt transported to [facility]

## 2023-05-26 NOTE — H&P ADULT - CONVERSATION DETAILS
Patient does not have any documented HCP but reports he would defer care to his daughter Kira if he is unable to make decision. He says that he would like us to do anything necessary including CPR and intubation if needed.  Patient at this time is full code.

## 2023-05-26 NOTE — OCCUPATIONAL THERAPY INITIAL EVALUATION ADULT - LIVES WITH, PROFILE
Patient lives alone in a private home with a few steps to enter (Patient reports his niece owns the house).

## 2023-05-26 NOTE — CONSULT NOTE ADULT - SUBJECTIVE AND OBJECTIVE BOX
Neurology - Consult Note    -  Spectra: [id number] ([facility]), [id number] ([facility])  -    HPI: Patient MAEVE JARVIS is a 72y (1950) man with a PMHx significant for HTN, HLD, DM2, CAD s/p CABG, who presents w/ CC of dyarthria. LKW 2 nights ago (~9pm). He lives alone and reports he woke up at 6am on 5/25 feeling fine. At 3pm, he was speaking to family members and having dysarthria. He was also slower to respond and answer questions. This lasted until 8pm and has resolved now. He denies any associated focal weakness, numbness, blurry vision, dizziness, headaches, difficulty ambulating. He has no family history of neurological disease. He reports having Bell's palsy in the past that affected the R side. He is independent in ADLs at baseline and ambulates w/o assistance devices      Review of Systems:   CONSTITUTIONAL: No fevers or chills  EYES AND ENT: No visual changes or no throat pain   NECK: No pain or stiffness  RESPIRATORY: No hemoptysis or shortness of breath  CARDIOVASCULAR: No chest pain or palpitations  GASTROINTESTINAL: No melena or hematochezia  GENITOURINARY: No dysuria or hematuria  NEUROLOGICAL: +As stated in HPI above  SKIN: No itching, burning, rashes, or lesions   All other review of systems is negative unless indicated above.    Allergies:  No Known Allergies      PMHx/PSHx/Family Hx: As above, otherwise see below   No pertinent past medical history        Social Hx:  Lives alone    Medications:  MEDICATIONS  (STANDING):    MEDICATIONS  (PRN):      Vitals:  T(C): 36.7 (05-25-23 @ 23:29), Max: 36.9 (05-25-23 @ 22:10)  HR: 57 (05-25-23 @ 23:29) (57 - 68)  BP: 133/91 (05-25-23 @ 23:29) (133/91 - 149/88)  RR: 16 (05-25-23 @ 23:29) (16 - 16)  SpO2: 99% (05-25-23 @ 23:29) (99% - 100%)    Physical Examination:  General - NAD  Cardiovascular - Peripheral pulses palpable, no edema  Eyes - Fundoscopy not performed due to safety precautions in the setting of the COVID-19 pandemic    Neurologic Exam:  Mental status - Awake, Alert, Oriented to person, place, and time. Speech fluent. Follows simple and complex commands. Attention/concentration, recent and remote memory (including registration and recall), and fund of knowledge intact    Cranial nerves - PERRLA, VFF, EOMI, face sensation (V1-V3) intact b/l, mild L facial droop, hearing intact b/l, palate with symmetric elevation, trapezius 5/5 strength b/l, tongue midline on protrusion with full lateral movement    Motor - Normal bulk and tone throughout. No pronator drift.  Strength testing            Deltoid      Biceps      Triceps     Wrist Extension    Wrist Flexion     Interossei         R            5                 5               5                     5                              5                        5                 5  L             5                 5               5                     5                              5                        5                 5              Hip Flexion    Hip Extension    Knee Flexion    Knee Extension    Dorsiflexion    Plantar Flexion  R              5                           5                       5                           5                            5                          5  L              5                           5                        5                           5                            5                          5    Sensation - Light touch intact throughout    DTR's -             Biceps      Triceps     Brachioradialis      Patellar    Ankle    Toes/plantar response  R             2+             2+                  2+                       2+            2+                 Down  L              2+             2+                 2+                        2+           2+                 Down    Coordination - Finger to Nose intact b/l. No tremors appreciated    Gait and station - Normal casual gait. Romberg (-)    Labs:                        13.9   7.90  )-----------( 226      ( 26 May 2023 00:41 )             42.9     05-26    135  |  99  |  19  ----------------------------<  102<H>  4.8   |  24  |  1.13    Ca    9.1      26 May 2023 00:41    TPro  7.6  /  Alb  4.0  /  TBili  1.4<H>  /  DBili  x   /  AST  31  /  ALT  24  /  AlkPhos  66  05-26    CAPILLARY BLOOD GLUCOSE      POCT Blood Glucose.: 110 mg/dL (25 May 2023 22:18)    LIVER FUNCTIONS - ( 26 May 2023 00:41 )  Alb: 4.0 g/dL / Pro: 7.6 g/dL / ALK PHOS: 66 U/L / ALT: 24 U/L / AST: 31 U/L / GGT: x                           Radiology:  CTH - chronic R basal ganglia infarct, no acute pathology

## 2023-05-26 NOTE — H&P ADULT - ASSESSMENT
71 y/o M with PMH of DM2, HTN, HLD, CAD s/p CABG presents with slurred speech admitted for CVA workup

## 2023-05-26 NOTE — ED ADULT NURSE REASSESSMENT NOTE - NSFALLUNIVINTERV_ED_ALL_ED
Bed/Stretcher in lowest position, wheels locked, appropriate side rails in place/Call bell, personal items and telephone in reach/Instruct patient to call for assistance before getting out of bed/chair/stretcher/Non-slip footwear applied when patient is off stretcher/[place] to call system/Physically safe environment - no spills, clutter or unnecessary equipment/Purposeful proactive rounding/Room/bathroom lighting operational, light cord in reach

## 2023-05-26 NOTE — H&P ADULT - PROBLEM SELECTOR PLAN 6
-noted to have right chest wall rash which is scabbed over  -patient noted he developed rash a week ago  -denies pain or pruritis  -most consistent with resolving zoster rash - Appears to be limited to C5 dermatome

## 2023-05-26 NOTE — ED PROVIDER NOTE - OBJECTIVE STATEMENT
Patient is a 72 year-old-male with history of CAD s/p CABG, DM, HTN, HLD and hypothyroidism presents with slurred speech. Accompanied by daughter who reports that around 3pm, patient's sister noticed that he was having slurred speech, slow to response and unable to form full sentences. Denies fever, chills, nausea, vomiting, chest pain, shortness of breath, abdominal pain, urinary or bowel complaints.

## 2023-05-26 NOTE — CONSULT NOTE ADULT - ASSESSMENT
72y M with Hx CAD s/p CABG, HTN, HLD, DM2, who presents w/ CC of transient dysarthria      LKW: nighttime 5/24  NIHSS:  1  Baseline MRS: 0  Not a Teneceteplase candidate due to outside of time window  Not a thrombectomy candidate due to  outside of time window    CT head: chronic R BG infarct, no acute pathology  CTA/P: report pending    Impression:  few hours of dysarthria, now resolved. Exam notable for L facial droop, corresponding w/ chronic R BG infarct seen on CTH. DDx ischemic stroke, TIA, recrudescence, toxic/infectious/metabolic. Would pursue stroke work-up    Mechanism:     Recommendations:  [] c/w home aspirin 81mg daily  [] c/w home Lipitor. Increase to 80mg daily if LDL>70  [] HgbA1C, fasting lipid panel, CBC, CMP, coag panel, troponin  [] MRI brain w/o con (if not contraindicated)  [] TTE w/ bubble study  [] telemetry to check for arrhythmia, EKG, will discuss loop recorder    - Tight glucose control (long-term goal HgbA1c < 6%)  - Stroke education and counseling  - Neuro-checks and VS q4h  - Permissive HTN up to 220/120 for 24-48h from symptom onset  - Dysphagia screen. If fails, speech/swallow eval  - aspiration, fall precautions  - STAT CT head non-contrast for change in neuro exam.   - PT/ OT / DVT ppx per primary team     Discussed with stroke attending Dr. Everette Granda      regarding decision against candidacy for Tenecteplase / thrombectomy. Will be formally staffed on morning rounds with attending. Recommendations will be complete once signed by attending.

## 2023-05-26 NOTE — OCCUPATIONAL THERAPY INITIAL EVALUATION ADULT - PERTINENT HX OF CURRENT PROBLEM, REHAB EVAL
72 year old male with history significant for HTN, HLD, DM2, CAD s/p CABG, presents with dysarthria. CTA H/N demonstrating right MCA severe stenosis. Admitted to r/o CVA.

## 2023-05-26 NOTE — ED PROVIDER NOTE - ADMIT DISPOSITION PRESENT ON ADMISSION SEPSIS
Problem: Respiratory Impairment - Respiratory Therapy 253  Intervention: Airway clearance techniques  Note: Intervention Status  Done  Intervention: Inhaled medication delivery  Note: Intervention Status  Done  Intervention: Respiratory support devices  Note: Intervention Status  Done      No

## 2023-05-26 NOTE — OCCUPATIONAL THERAPY INITIAL EVALUATION ADULT - NSOTDISCHREC_GEN_A_CORE
Home with no OT needs. Patient appears to be at baseline for ADLs and functional mobility. Patient will not be placed on OT program. Please reconsult this discipline with any changes.

## 2023-05-26 NOTE — ED PROVIDER NOTE - PROGRESS NOTE DETAILS
Surya PGY2 Surya PGY2  Patient's sister indicated that she last saw him about 2 days ago and was normal then. Neuro recommends further workup for stroke. Admitted to hospitalist for further management.

## 2023-05-26 NOTE — H&P ADULT - PROBLEM SELECTOR PLAN 1
-patient presents with slurred speech admitted for CVA workup  -no acute CVA noted on CTH, MRI pending  -c/w asa 81 and lipitor 80  -TTE with bubble study  -permissive HTN  -tele monitoring  -PT/OT  -neuro following

## 2023-05-26 NOTE — PHYSICAL THERAPY INITIAL EVALUATION ADULT - PERTINENT HX OF CURRENT PROBLEM, REHAB EVAL
Pt. presented with slurred speech admitted for CVA workup. Per documentation, CT head revealed no evidence of acute intracranial hemorrhage, mass effect, or evidence of acute vascular territorial infarction.

## 2023-05-26 NOTE — H&P ADULT - PROBLEM SELECTOR PLAN 2
-permissive HTN  - home meds had pill bottles of both metoprolol tartrate 25BID and metoprolol succ 50daily  -educated that he should only take one of them -permissive HTN today  - home meds had pill bottles of both metoprolol tartrate 25BID and metoprolol succ 50daily  -educated that he should only take one of them

## 2023-05-26 NOTE — CONSULT NOTE ADULT - ATTENDING COMMENTS
is a 72-year-old man past medical history of CABG hypertension hyperlipidemia, presented with dysarthria.  He states that he feels better and has no other focal neurological symptoms.  Neurological exam he is alert, awake oriented x4 otherwise no focal deficits speech is easily understandable.  On review of imaging he has severe right M1 stenosis with increased flow in M2 segments.  Impression: Dysarthria and left facial droop likely secondary to right hemispheric ischemia  Await MRI brain without contrast  Continue dual antiplatelet treatment with aspirin Plavix, high-dose statins  PT OT  assessment  Otherwise stroke work-up as mentioned above resident note.

## 2023-05-26 NOTE — PATIENT PROFILE ADULT - FALL HARM RISK - HARM RISK INTERVENTIONS
Assistance with ambulation/Assistance OOB with selected safe patient handling equipment/Communicate Risk of Fall with Harm to all staff/Reinforce activity limits and safety measures with patient and family/Tailored Fall Risk Interventions/Visual Cue: Yellow wristband and red socks/Bed in lowest position, wheels locked, appropriate side rails in place/Call bell, personal items and telephone in reach/Instruct patient to call for assistance before getting out of bed or chair/Non-slip footwear when patient is out of bed/[place] to call system/Physically safe environment - no spills, clutter or unnecessary equipment/Purposeful Proactive Rounding/Room/bathroom lighting operational, light cord in reach Assistance with ambulation/Assistance OOB with selected safe patient handling equipment/Communicate Risk of Fall with Harm to all staff/Monitor for mental status changes/Monitor gait and stability/Reinforce activity limits and safety measures with patient and family/Reorient to person, place and time as needed/Review medications for side effects contributing to fall risk/Sit up slowly, dangle for a short time, stand at bedside before walking/Tailored Fall Risk Interventions/Use of alarms - bed, chair and/or voice tab/Visual Cue: Yellow wristband and red socks/Bed in lowest position, wheels locked, appropriate side rails in place/Call bell, personal items and telephone in reach/Instruct patient to call for assistance before getting out of bed or chair/Non-slip footwear when patient is out of bed/[place] to call system/Physically safe environment - no spills, clutter or unnecessary equipment/Purposeful Proactive Rounding/Room/bathroom lighting operational, light cord in reach

## 2023-05-26 NOTE — H&P ADULT - HISTORY OF PRESENT ILLNESS
72y man with a PMHx significant for HTN, HLD, DM2, CAD s/p CABG, who presents with dyarthria. He lives alone and reports he woke up at 6am on 5/25 feeling fine. At 3pm, he was speaking to family members and having dysarthria. He was also slower to respond and answer questions. This lasted until 8pm and has resolved now. He denies any associated focal weakness, numbness, blurry vision, dizziness, headaches, difficulty ambulating. He has no family history of neurological disease. He reports having Bell's palsy in the past that affected the R side. He is independent in ADLs at baseline and ambulates w/o assistance devices. Denies any chest pain, SOB, fevers or chills. On exam he was noted to have a scabbed over rash on right chest wall. Patient states that he had a rash about a week ago. Denies any pain. Unable to describe if it was vesicular or not. At this time denies any pain or pruritis. Patient wants to go home.     Vital Signs Last 24 Hrs  T(C): 36.4 (26 May 2023 10:12), Max: 36.9 (25 May 2023 22:10)  T(F): 97.5 (26 May 2023 10:12), Max: 98.4 (25 May 2023 22:10)  HR: 64 (26 May 2023 10:12) (57 - 68)  BP: 132/76 (26 May 2023 10:12) (132/76 - 149/88)  BP(mean): --  RR: 18 (26 May 2023 10:12) (16 - 18)  SpO2: 98% (26 May 2023 10:12) (98% - 100%)    Parameters below as of 26 May 2023 10:12  Patient On (Oxygen Delivery Method): room air     72y man with a PMHx significant for HTN, HLD, DM2, CAD s/p CABG, who presents with dysarthria. He lives alone and reports he woke up at 6am on 5/25 feeling fine. At 3pm, he was speaking to family members and having dysarthria. He was also slower to respond and answer questions. This lasted until 8pm and has resolved now. He denies any associated focal weakness, numbness, blurry vision, dizziness, headaches, difficulty ambulating. He has no family history of neurological disease. He reports having Bell's palsy in the past that affected the R side. He is independent in ADLs at baseline and ambulates w/o assistance devices. Denies any chest pain, SOB, fevers or chills. On exam he was noted to have a scabbed over rash on right chest wall. Patient states that he had a rash about a week ago. Denies any pain. Unable to describe if it was vesicular or not. At this time denies any pain or pruritis. Patient wants to go home.     Vital Signs Last 24 Hrs  T(C): 36.4 (26 May 2023 10:12), Max: 36.9 (25 May 2023 22:10)  T(F): 97.5 (26 May 2023 10:12), Max: 98.4 (25 May 2023 22:10)  HR: 64 (26 May 2023 10:12) (57 - 68)  BP: 132/76 (26 May 2023 10:12) (132/76 - 149/88)  BP(mean): --  RR: 18 (26 May 2023 10:12) (16 - 18)  SpO2: 98% (26 May 2023 10:12) (98% - 100%)    Parameters below as of 26 May 2023 10:12  Patient On (Oxygen Delivery Method): room air

## 2023-05-26 NOTE — ED PROVIDER NOTE - CLINICAL SUMMARY MEDICAL DECISION MAKING FREE TEXT BOX
Patient is a 72 year-old-male with history of CAD s/p CABG, DM, HTN, HLD and hypothyroidism presents with slurred speech and left facial droop. Concerned for stroke. Unknown LKN, will ask daughter to obtain more collateral information. Neurology at bedside.

## 2023-05-26 NOTE — ED PROVIDER NOTE - CARE PLAN
Principal Discharge DX:	Slurred speech   1 Principal Discharge DX:	Slurred speech  Secondary Diagnosis:	Facial droop

## 2023-05-26 NOTE — ED PROVIDER NOTE - PHYSICAL EXAMINATION
Vitals: I have reviewed the patients vital signs  General: Well dressed, well appearing, no acute distress  HEENT: Atraumatic, normocephalic, airway patent  Eyes: EOMI, tracking appropriately  Neck: no tracheal deviation, no JVD  Chest/Lungs: no trauma, symmetric chest rise, speaking in complete sentences, no WOB  Heart: skin and extremities well perfused, regular rate and rhythm  Abdomen: soft, nontender and nondistended   Neuro: A+Ox3, ambulating without difficulty, slurred speech, L facial droop   MSK: strength at baseline in all extremities, no muscle wasting or atrophy  Skin: no cyanosis, no jaundice, no new emergent lesions

## 2023-05-26 NOTE — PHYSICAL THERAPY INITIAL EVALUATION ADULT - GENERAL OBSERVATIONS, REHAB EVAL
Consult received, chart reviewed. Patient received in stretcher, no apparent distress, +tele, HR 61 bpm.

## 2023-05-26 NOTE — ED ADULT NURSE REASSESSMENT NOTE - NS ED NURSE REASSESS COMMENT FT1
Report given to ESSU2 MARTÍN Marie, [facility]
Received report from Mid-shift RN Prachi Zaldivar: Pt appears to be resting comfortably, NAD, respirations are even and unlabored, pt admitted, awaiting for bed assignment, denies any complaints or discomfort at this moment, Safety precautions implemented as per protocol, awaiting further MD orders, will continue with plan of care.

## 2023-05-26 NOTE — ED PROVIDER NOTE - ATTENDING CONTRIBUTION TO CARE
DR. MONTES, ATTENDING MD-  I performed a face to face bedside interview with the patient regarding history of present illness, review of symptoms and past medical history. I completed an independent physical exam.  I have discussed the patient's plan of care with the resident.   Documentation as above in the note.    72-year-old male presenting with slurred speech facial droop decreased appetite for last 2 days.  Patient last seen well by family members at that time.  Patient activated as code stroke.  Neurology at bedside.  We will follow-up recommendations.

## 2023-05-26 NOTE — OCCUPATIONAL THERAPY INITIAL EVALUATION ADULT - GENERAL OBSERVATIONS, REHAB EVAL
Patient received semisupine in bed in NAD; agreeable to participate in OT evaluation. +tele. HR 66 bpm.

## 2023-05-26 NOTE — ED ADULT NURSE NOTE - NSFALLUNIVINTERV_ED_ALL_ED
Bed/Stretcher in lowest position, wheels locked, appropriate side rails in place/Call bell, personal items and telephone in reach/Instruct patient to call for assistance before getting out of bed/chair/stretcher/Non-slip footwear applied when patient is off stretcher/[place] to call system/Physically safe environment - no spills, clutter or unnecessary equipment/Purposeful proactive rounding/Room/bathroom lighting operational, light cord in reach

## 2023-05-26 NOTE — ED ADULT NURSE NOTE - CHIEF COMPLAINT QUOTE
Pt c/o dec PO intake  and not feeling him self. per family around 3pm they notices his speech was slurred and was taking longer to respond and hard to get a sentence together. per family slurred speech resolved but still slow to respond. No facial droop or slurred speech noted. Pt has equal strength, motor, and sensation to bilateral upper and lower extremities. No drifts noted to bilateral upper and lower extremities. No complaints of chest pain, headache, nausea, dizziness, vomiting  SOB, fever, chills verbalized. pt evaluated by Dr vásquez and no code stroke called.

## 2023-05-27 LAB
A1C WITH ESTIMATED AVERAGE GLUCOSE RESULT: 6.1 % — HIGH (ref 4–5.6)
ANION GAP SERPL CALC-SCNC: 13 MMOL/L — SIGNIFICANT CHANGE UP (ref 7–14)
BUN SERPL-MCNC: 19 MG/DL — SIGNIFICANT CHANGE UP (ref 7–23)
CALCIUM SERPL-MCNC: 9.3 MG/DL — SIGNIFICANT CHANGE UP (ref 8.4–10.5)
CHLORIDE SERPL-SCNC: 100 MMOL/L — SIGNIFICANT CHANGE UP (ref 98–107)
CHOLEST SERPL-MCNC: 86 MG/DL — SIGNIFICANT CHANGE UP
CO2 SERPL-SCNC: 23 MMOL/L — SIGNIFICANT CHANGE UP (ref 22–31)
CREAT SERPL-MCNC: 0.99 MG/DL — SIGNIFICANT CHANGE UP (ref 0.5–1.3)
EGFR: 80 ML/MIN/1.73M2 — SIGNIFICANT CHANGE UP
ESTIMATED AVERAGE GLUCOSE: 128 — SIGNIFICANT CHANGE UP
GLUCOSE SERPL-MCNC: 101 MG/DL — HIGH (ref 70–99)
HCT VFR BLD CALC: 44.2 % — SIGNIFICANT CHANGE UP (ref 39–50)
HDLC SERPL-MCNC: 38 MG/DL — LOW
HGB BLD-MCNC: 14.4 G/DL — SIGNIFICANT CHANGE UP (ref 13–17)
LIPID PNL WITH DIRECT LDL SERPL: 40 MG/DL — SIGNIFICANT CHANGE UP
MCHC RBC-ENTMCNC: 25 PG — LOW (ref 27–34)
MCHC RBC-ENTMCNC: 32.6 GM/DL — SIGNIFICANT CHANGE UP (ref 32–36)
MCV RBC AUTO: 76.9 FL — LOW (ref 80–100)
NON HDL CHOLESTEROL: 48 MG/DL — SIGNIFICANT CHANGE UP
NRBC # BLD: 0 /100 WBCS — SIGNIFICANT CHANGE UP (ref 0–0)
NRBC # FLD: 0 K/UL — SIGNIFICANT CHANGE UP (ref 0–0)
PLATELET # BLD AUTO: 247 K/UL — SIGNIFICANT CHANGE UP (ref 150–400)
POTASSIUM SERPL-MCNC: 4.1 MMOL/L — SIGNIFICANT CHANGE UP (ref 3.5–5.3)
POTASSIUM SERPL-SCNC: 4.1 MMOL/L — SIGNIFICANT CHANGE UP (ref 3.5–5.3)
RBC # BLD: 5.75 M/UL — SIGNIFICANT CHANGE UP (ref 4.2–5.8)
RBC # FLD: 14.3 % — SIGNIFICANT CHANGE UP (ref 10.3–14.5)
SODIUM SERPL-SCNC: 136 MMOL/L — SIGNIFICANT CHANGE UP (ref 135–145)
TRIGL SERPL-MCNC: 41 MG/DL — SIGNIFICANT CHANGE UP
TSH SERPL-MCNC: 4.16 UIU/ML — SIGNIFICANT CHANGE UP (ref 0.27–4.2)
WBC # BLD: 7.93 K/UL — SIGNIFICANT CHANGE UP (ref 3.8–10.5)
WBC # FLD AUTO: 7.93 K/UL — SIGNIFICANT CHANGE UP (ref 3.8–10.5)

## 2023-05-27 PROCEDURE — 99233 SBSQ HOSP IP/OBS HIGH 50: CPT

## 2023-05-27 RX ORDER — CHLORHEXIDINE GLUCONATE 213 G/1000ML
1 SOLUTION TOPICAL DAILY
Refills: 0 | Status: DISCONTINUED | OUTPATIENT
Start: 2023-05-27 | End: 2023-05-30

## 2023-05-27 RX ORDER — CYCLOBENZAPRINE HYDROCHLORIDE 10 MG/1
5 TABLET, FILM COATED ORAL ONCE
Refills: 0 | Status: COMPLETED | OUTPATIENT
Start: 2023-05-27 | End: 2023-05-27

## 2023-05-27 RX ORDER — GABAPENTIN 400 MG/1
100 CAPSULE ORAL ONCE
Refills: 0 | Status: COMPLETED | OUTPATIENT
Start: 2023-05-27 | End: 2023-05-27

## 2023-05-27 RX ORDER — ACETAMINOPHEN 500 MG
650 TABLET ORAL EVERY 6 HOURS
Refills: 0 | Status: DISCONTINUED | OUTPATIENT
Start: 2023-05-27 | End: 2023-05-30

## 2023-05-27 RX ADMIN — CYCLOBENZAPRINE HYDROCHLORIDE 5 MILLIGRAM(S): 10 TABLET, FILM COATED ORAL at 21:23

## 2023-05-27 RX ADMIN — CHLORHEXIDINE GLUCONATE 1 APPLICATION(S): 213 SOLUTION TOPICAL at 21:26

## 2023-05-27 RX ADMIN — Medication 25 MICROGRAM(S): at 05:05

## 2023-05-27 RX ADMIN — CLOPIDOGREL BISULFATE 75 MILLIGRAM(S): 75 TABLET, FILM COATED ORAL at 12:09

## 2023-05-27 RX ADMIN — Medication 650 MILLIGRAM(S): at 15:00

## 2023-05-27 RX ADMIN — Medication 81 MILLIGRAM(S): at 12:09

## 2023-05-27 RX ADMIN — Medication 650 MILLIGRAM(S): at 15:30

## 2023-05-27 RX ADMIN — ENOXAPARIN SODIUM 40 MILLIGRAM(S): 100 INJECTION SUBCUTANEOUS at 12:08

## 2023-05-27 RX ADMIN — ATORVASTATIN CALCIUM 80 MILLIGRAM(S): 80 TABLET, FILM COATED ORAL at 21:23

## 2023-05-27 RX ADMIN — GABAPENTIN 100 MILLIGRAM(S): 400 CAPSULE ORAL at 10:55

## 2023-05-27 NOTE — DIETITIAN INITIAL EVALUATION ADULT - PROBLEM SELECTOR PLAN 2
-permissive HTN today  - home meds had pill bottles of both metoprolol tartrate 25BID and metoprolol succ 50daily  -educated that he should only take one of them

## 2023-05-27 NOTE — DIETITIAN INITIAL EVALUATION ADULT - OTHER INFO
Per chart, 71 y/o male with PMH of DM2, HTN, HLD, CAD s/p CABG presents with slurred speech admitted for CVA workup    Nutrition interview: No recent episodes of nausea, vomiting, diarrhea or constipation, BM noted today per Pt. Denies any chewing/swallowing difficulties. Pt states his neck muscles have been hurting him so is contributing to some difficulty with swallowing. However states he is able to tolerate regular diet. Consider swallow eval. No food allergies. Stated UBW: 120# x 1-2weeks ago (weighed self at home). Per HIE Pt was 165# (sept 2021) about 1 year and half ago. Pt noted with non-significant wt loss x1 year and 8 months (-27.2%). Pt believes wt loss to be attributed to diet/lifestyle change s/p surgery at that time. Food preferences explored and noted. Does not eat beef or pork. Intake is 50-75% per RN flowsheets and per pt. Feeding skills: independent. RD offered nutrition protein supplements today, Pt declined, states he feels he eats enough of regular food to sustain PO intake and maintain weight.     Pt declined diet education at present. Per teach back Pt seemed very knowledgeable on DM diet.

## 2023-05-27 NOTE — DIETITIAN INITIAL EVALUATION ADULT - PERTINENT MEDS FT
MEDICATIONS  (STANDING):  aspirin enteric coated 81 milliGRAM(s) Oral daily  atorvastatin 80 milliGRAM(s) Oral at bedtime  clopidogrel Tablet 75 milliGRAM(s) Oral daily  dextrose 5%. 1000 milliLiter(s) (100 mL/Hr) IV Continuous <Continuous>  dextrose 5%. 1000 milliLiter(s) (50 mL/Hr) IV Continuous <Continuous>  dextrose 50% Injectable 12.5 Gram(s) IV Push once  dextrose 50% Injectable 25 Gram(s) IV Push once  dextrose 50% Injectable 25 Gram(s) IV Push once  enoxaparin Injectable 40 milliGRAM(s) SubCutaneous every 24 hours  glucagon  Injectable 1 milliGRAM(s) IntraMuscular once  insulin lispro (ADMELOG) corrective regimen sliding scale   SubCutaneous three times a day before meals  insulin lispro (ADMELOG) corrective regimen sliding scale   SubCutaneous at bedtime  levothyroxine 25 MICROGram(s) Oral daily    MEDICATIONS  (PRN):  dextrose Oral Gel 15 Gram(s) Oral once PRN Blood Glucose LESS THAN 70 milliGRAM(s)/deciliter

## 2023-05-27 NOTE — DIETITIAN INITIAL EVALUATION ADULT - ORAL INTAKE PTA/DIET HISTORY
Pt lives at home alone in his own apartment but shares the house with family (niece and sister). They cook together at home. No difficulty obtaining groceries. Pt follows a low sugar/low carbohydrate diet PTA. Takes a multivitamin, Vitamin B12, Vitamin D3 and q10 PTA.

## 2023-05-27 NOTE — CHART NOTE - NSCHARTNOTEFT_GEN_A_CORE
Notified by RN pt complaint of neck pain.   Pt seen sitting on chair eating his breakfast. Reported 2 weeks ago had red spots appeared on the right side of chest then spread to the shoulder and R upper back now up the neck. During the time from 2 weeks to now no complaint of burning sensation/pain/itching until this morning with neck pain.     + scars with scabs noted on R chest, R shoulder and upper arm and R side of back and neck.   Above to slowly move his neck with limited 2/2 pain   trial of gabapentin     Attending aware and will assess the pt

## 2023-05-27 NOTE — DIETITIAN INITIAL EVALUATION ADULT - PERTINENT LABORATORY DATA
05-27 Na 136 mmol/L Glu 101 mg/dL<H> K+ 4.1 mmol/L Cr 0.99 mg/dL BUN 19 mg/dL Phos n/a    05-27 @ 08:39 POCT 107 mg/dL  05-26 @ 21:28 POCT 140 mg/dL  05-26 @ 18:01 POCT 88 mg/dL  05-26 @ 17:02 POCT 100 mg/dL  05-26 @ 13:12 POCT 182 mg/dL    POCT Blood Glucose.: 107 mg/dL (05-27-23 @ 08:39)  A1C with Estimated Average Glucose Result: 6.1 % (05-27-23 @ 03:20)

## 2023-05-27 NOTE — DIETITIAN INITIAL EVALUATION ADULT - NS FNS DIET ORDER
Diet, Regular:   Consistent Carbohydrate {Evening Snack} (CSTCHOSN)  DASH/TLC {Sodium & Cholesterol Restricted} (DASH) (05-26-23 @ 09:46) [Active]

## 2023-05-28 LAB
ANION GAP SERPL CALC-SCNC: 12 MMOL/L — SIGNIFICANT CHANGE UP (ref 7–14)
BASOPHILS # BLD AUTO: 0.05 K/UL — SIGNIFICANT CHANGE UP (ref 0–0.2)
BASOPHILS NFR BLD AUTO: 0.7 % — SIGNIFICANT CHANGE UP (ref 0–2)
BUN SERPL-MCNC: 21 MG/DL — SIGNIFICANT CHANGE UP (ref 7–23)
CALCIUM SERPL-MCNC: 9.4 MG/DL — SIGNIFICANT CHANGE UP (ref 8.4–10.5)
CHLORIDE SERPL-SCNC: 103 MMOL/L — SIGNIFICANT CHANGE UP (ref 98–107)
CO2 SERPL-SCNC: 23 MMOL/L — SIGNIFICANT CHANGE UP (ref 22–31)
CREAT SERPL-MCNC: 1.06 MG/DL — SIGNIFICANT CHANGE UP (ref 0.5–1.3)
EGFR: 74 ML/MIN/1.73M2 — SIGNIFICANT CHANGE UP
EOSINOPHIL # BLD AUTO: 0.23 K/UL — SIGNIFICANT CHANGE UP (ref 0–0.5)
EOSINOPHIL NFR BLD AUTO: 3.2 % — SIGNIFICANT CHANGE UP (ref 0–6)
GLUCOSE SERPL-MCNC: 97 MG/DL — SIGNIFICANT CHANGE UP (ref 70–99)
HCT VFR BLD CALC: 44.6 % — SIGNIFICANT CHANGE UP (ref 39–50)
HGB BLD-MCNC: 14.4 G/DL — SIGNIFICANT CHANGE UP (ref 13–17)
IANC: 3.56 K/UL — SIGNIFICANT CHANGE UP (ref 1.8–7.4)
IMM GRANULOCYTES NFR BLD AUTO: 0.3 % — SIGNIFICANT CHANGE UP (ref 0–0.9)
LYMPHOCYTES # BLD AUTO: 2.56 K/UL — SIGNIFICANT CHANGE UP (ref 1–3.3)
LYMPHOCYTES # BLD AUTO: 35.7 % — SIGNIFICANT CHANGE UP (ref 13–44)
MAGNESIUM SERPL-MCNC: 2.2 MG/DL — SIGNIFICANT CHANGE UP (ref 1.6–2.6)
MCHC RBC-ENTMCNC: 25.6 PG — LOW (ref 27–34)
MCHC RBC-ENTMCNC: 32.3 GM/DL — SIGNIFICANT CHANGE UP (ref 32–36)
MCV RBC AUTO: 79.4 FL — LOW (ref 80–100)
MONOCYTES # BLD AUTO: 0.75 K/UL — SIGNIFICANT CHANGE UP (ref 0–0.9)
MONOCYTES NFR BLD AUTO: 10.5 % — SIGNIFICANT CHANGE UP (ref 2–14)
NEUTROPHILS # BLD AUTO: 3.56 K/UL — SIGNIFICANT CHANGE UP (ref 1.8–7.4)
NEUTROPHILS NFR BLD AUTO: 49.6 % — SIGNIFICANT CHANGE UP (ref 43–77)
NRBC # BLD: 0 /100 WBCS — SIGNIFICANT CHANGE UP (ref 0–0)
NRBC # FLD: 0 K/UL — SIGNIFICANT CHANGE UP (ref 0–0)
PHOSPHATE SERPL-MCNC: 3.8 MG/DL — SIGNIFICANT CHANGE UP (ref 2.5–4.5)
PLATELET # BLD AUTO: 275 K/UL — SIGNIFICANT CHANGE UP (ref 150–400)
POTASSIUM SERPL-MCNC: 4.2 MMOL/L — SIGNIFICANT CHANGE UP (ref 3.5–5.3)
POTASSIUM SERPL-SCNC: 4.2 MMOL/L — SIGNIFICANT CHANGE UP (ref 3.5–5.3)
RBC # BLD: 5.62 M/UL — SIGNIFICANT CHANGE UP (ref 4.2–5.8)
RBC # FLD: 14.4 % — SIGNIFICANT CHANGE UP (ref 10.3–14.5)
SODIUM SERPL-SCNC: 138 MMOL/L — SIGNIFICANT CHANGE UP (ref 135–145)
WBC # BLD: 7.17 K/UL — SIGNIFICANT CHANGE UP (ref 3.8–10.5)
WBC # FLD AUTO: 7.17 K/UL — SIGNIFICANT CHANGE UP (ref 3.8–10.5)

## 2023-05-28 PROCEDURE — 99232 SBSQ HOSP IP/OBS MODERATE 35: CPT

## 2023-05-28 RX ADMIN — ATORVASTATIN CALCIUM 80 MILLIGRAM(S): 80 TABLET, FILM COATED ORAL at 21:24

## 2023-05-28 RX ADMIN — CLOPIDOGREL BISULFATE 75 MILLIGRAM(S): 75 TABLET, FILM COATED ORAL at 11:58

## 2023-05-28 RX ADMIN — Medication 25 MICROGRAM(S): at 06:19

## 2023-05-28 RX ADMIN — Medication 1: at 13:07

## 2023-05-28 RX ADMIN — Medication 81 MILLIGRAM(S): at 11:58

## 2023-05-28 RX ADMIN — CHLORHEXIDINE GLUCONATE 1 APPLICATION(S): 213 SOLUTION TOPICAL at 11:55

## 2023-05-28 RX ADMIN — ENOXAPARIN SODIUM 40 MILLIGRAM(S): 100 INJECTION SUBCUTANEOUS at 11:57

## 2023-05-29 LAB
ANION GAP SERPL CALC-SCNC: 10 MMOL/L — SIGNIFICANT CHANGE UP (ref 7–14)
BASOPHILS # BLD AUTO: 0.06 K/UL — SIGNIFICANT CHANGE UP (ref 0–0.2)
BASOPHILS NFR BLD AUTO: 0.7 % — SIGNIFICANT CHANGE UP (ref 0–2)
BUN SERPL-MCNC: 24 MG/DL — HIGH (ref 7–23)
CALCIUM SERPL-MCNC: 9 MG/DL — SIGNIFICANT CHANGE UP (ref 8.4–10.5)
CHLORIDE SERPL-SCNC: 100 MMOL/L — SIGNIFICANT CHANGE UP (ref 98–107)
CO2 SERPL-SCNC: 22 MMOL/L — SIGNIFICANT CHANGE UP (ref 22–31)
CREAT SERPL-MCNC: 1.08 MG/DL — SIGNIFICANT CHANGE UP (ref 0.5–1.3)
EGFR: 72 ML/MIN/1.73M2 — SIGNIFICANT CHANGE UP
EOSINOPHIL # BLD AUTO: 0.2 K/UL — SIGNIFICANT CHANGE UP (ref 0–0.5)
EOSINOPHIL NFR BLD AUTO: 2.5 % — SIGNIFICANT CHANGE UP (ref 0–6)
GLUCOSE SERPL-MCNC: 99 MG/DL — SIGNIFICANT CHANGE UP (ref 70–99)
HCT VFR BLD CALC: 42.9 % — SIGNIFICANT CHANGE UP (ref 39–50)
HGB BLD-MCNC: 13.8 G/DL — SIGNIFICANT CHANGE UP (ref 13–17)
IANC: 4.27 K/UL — SIGNIFICANT CHANGE UP (ref 1.8–7.4)
IMM GRANULOCYTES NFR BLD AUTO: 0.2 % — SIGNIFICANT CHANGE UP (ref 0–0.9)
LYMPHOCYTES # BLD AUTO: 2.76 K/UL — SIGNIFICANT CHANGE UP (ref 1–3.3)
LYMPHOCYTES # BLD AUTO: 33.8 % — SIGNIFICANT CHANGE UP (ref 13–44)
MAGNESIUM SERPL-MCNC: 2.2 MG/DL — SIGNIFICANT CHANGE UP (ref 1.6–2.6)
MCHC RBC-ENTMCNC: 25.2 PG — LOW (ref 27–34)
MCHC RBC-ENTMCNC: 32.2 GM/DL — SIGNIFICANT CHANGE UP (ref 32–36)
MCV RBC AUTO: 78.3 FL — LOW (ref 80–100)
MONOCYTES # BLD AUTO: 0.85 K/UL — SIGNIFICANT CHANGE UP (ref 0–0.9)
MONOCYTES NFR BLD AUTO: 10.4 % — SIGNIFICANT CHANGE UP (ref 2–14)
NEUTROPHILS # BLD AUTO: 4.27 K/UL — SIGNIFICANT CHANGE UP (ref 1.8–7.4)
NEUTROPHILS NFR BLD AUTO: 52.4 % — SIGNIFICANT CHANGE UP (ref 43–77)
NRBC # BLD: 0 /100 WBCS — SIGNIFICANT CHANGE UP (ref 0–0)
NRBC # FLD: 0 K/UL — SIGNIFICANT CHANGE UP (ref 0–0)
PHOSPHATE SERPL-MCNC: 3.4 MG/DL — SIGNIFICANT CHANGE UP (ref 2.5–4.5)
PLATELET # BLD AUTO: 290 K/UL — SIGNIFICANT CHANGE UP (ref 150–400)
POTASSIUM SERPL-MCNC: 3.9 MMOL/L — SIGNIFICANT CHANGE UP (ref 3.5–5.3)
POTASSIUM SERPL-SCNC: 3.9 MMOL/L — SIGNIFICANT CHANGE UP (ref 3.5–5.3)
RBC # BLD: 5.48 M/UL — SIGNIFICANT CHANGE UP (ref 4.2–5.8)
RBC # FLD: 14.5 % — SIGNIFICANT CHANGE UP (ref 10.3–14.5)
SODIUM SERPL-SCNC: 132 MMOL/L — LOW (ref 135–145)
WBC # BLD: 8.16 K/UL — SIGNIFICANT CHANGE UP (ref 3.8–10.5)
WBC # FLD AUTO: 8.16 K/UL — SIGNIFICANT CHANGE UP (ref 3.8–10.5)

## 2023-05-29 PROCEDURE — 99232 SBSQ HOSP IP/OBS MODERATE 35: CPT

## 2023-05-29 RX ORDER — CYCLOBENZAPRINE HYDROCHLORIDE 10 MG/1
5 TABLET, FILM COATED ORAL THREE TIMES A DAY
Refills: 0 | Status: DISCONTINUED | OUTPATIENT
Start: 2023-05-29 | End: 2023-05-30

## 2023-05-29 RX ADMIN — Medication 650 MILLIGRAM(S): at 12:30

## 2023-05-29 RX ADMIN — Medication 650 MILLIGRAM(S): at 11:25

## 2023-05-29 RX ADMIN — CHLORHEXIDINE GLUCONATE 1 APPLICATION(S): 213 SOLUTION TOPICAL at 11:27

## 2023-05-29 RX ADMIN — ENOXAPARIN SODIUM 40 MILLIGRAM(S): 100 INJECTION SUBCUTANEOUS at 11:26

## 2023-05-29 RX ADMIN — Medication 0: at 12:44

## 2023-05-29 RX ADMIN — ATORVASTATIN CALCIUM 80 MILLIGRAM(S): 80 TABLET, FILM COATED ORAL at 21:32

## 2023-05-29 RX ADMIN — Medication 0: at 18:09

## 2023-05-29 RX ADMIN — Medication 25 MICROGRAM(S): at 05:08

## 2023-05-29 RX ADMIN — Medication 0: at 09:12

## 2023-05-29 RX ADMIN — Medication 81 MILLIGRAM(S): at 11:26

## 2023-05-29 RX ADMIN — CLOPIDOGREL BISULFATE 75 MILLIGRAM(S): 75 TABLET, FILM COATED ORAL at 11:26

## 2023-05-30 ENCOUNTER — TRANSCRIPTION ENCOUNTER (OUTPATIENT)
Age: 73
End: 2023-05-30

## 2023-05-30 VITALS
DIASTOLIC BLOOD PRESSURE: 78 MMHG | SYSTOLIC BLOOD PRESSURE: 142 MMHG | OXYGEN SATURATION: 100 % | TEMPERATURE: 99 F | RESPIRATION RATE: 17 BRPM | HEART RATE: 87 BPM

## 2023-05-30 DIAGNOSIS — G45.9 TRANSIENT CEREBRAL ISCHEMIC ATTACK, UNSPECIFIED: ICD-10-CM

## 2023-05-30 LAB
ANION GAP SERPL CALC-SCNC: 12 MMOL/L — SIGNIFICANT CHANGE UP (ref 7–14)
BASOPHILS # BLD AUTO: 0.05 K/UL — SIGNIFICANT CHANGE UP (ref 0–0.2)
BASOPHILS NFR BLD AUTO: 0.7 % — SIGNIFICANT CHANGE UP (ref 0–2)
BUN SERPL-MCNC: 19 MG/DL — SIGNIFICANT CHANGE UP (ref 7–23)
CALCIUM SERPL-MCNC: 9.1 MG/DL — SIGNIFICANT CHANGE UP (ref 8.4–10.5)
CHLORIDE SERPL-SCNC: 100 MMOL/L — SIGNIFICANT CHANGE UP (ref 98–107)
CO2 SERPL-SCNC: 22 MMOL/L — SIGNIFICANT CHANGE UP (ref 22–31)
CREAT SERPL-MCNC: 1.07 MG/DL — SIGNIFICANT CHANGE UP (ref 0.5–1.3)
EGFR: 73 ML/MIN/1.73M2 — SIGNIFICANT CHANGE UP
EOSINOPHIL # BLD AUTO: 0.23 K/UL — SIGNIFICANT CHANGE UP (ref 0–0.5)
EOSINOPHIL NFR BLD AUTO: 3.1 % — SIGNIFICANT CHANGE UP (ref 0–6)
GLUCOSE SERPL-MCNC: 92 MG/DL — SIGNIFICANT CHANGE UP (ref 70–99)
HCT VFR BLD CALC: 43.2 % — SIGNIFICANT CHANGE UP (ref 39–50)
HGB BLD-MCNC: 14.1 G/DL — SIGNIFICANT CHANGE UP (ref 13–17)
IANC: 3.49 K/UL — SIGNIFICANT CHANGE UP (ref 1.8–7.4)
IMM GRANULOCYTES NFR BLD AUTO: 0.3 % — SIGNIFICANT CHANGE UP (ref 0–0.9)
LYMPHOCYTES # BLD AUTO: 2.67 K/UL — SIGNIFICANT CHANGE UP (ref 1–3.3)
LYMPHOCYTES # BLD AUTO: 36.4 % — SIGNIFICANT CHANGE UP (ref 13–44)
MAGNESIUM SERPL-MCNC: 2.4 MG/DL — SIGNIFICANT CHANGE UP (ref 1.6–2.6)
MCHC RBC-ENTMCNC: 25.5 PG — LOW (ref 27–34)
MCHC RBC-ENTMCNC: 32.6 GM/DL — SIGNIFICANT CHANGE UP (ref 32–36)
MCV RBC AUTO: 78 FL — LOW (ref 80–100)
MONOCYTES # BLD AUTO: 0.87 K/UL — SIGNIFICANT CHANGE UP (ref 0–0.9)
MONOCYTES NFR BLD AUTO: 11.9 % — SIGNIFICANT CHANGE UP (ref 2–14)
MRSA PCR RESULT.: SIGNIFICANT CHANGE UP
NEUTROPHILS # BLD AUTO: 3.49 K/UL — SIGNIFICANT CHANGE UP (ref 1.8–7.4)
NEUTROPHILS NFR BLD AUTO: 47.6 % — SIGNIFICANT CHANGE UP (ref 43–77)
NRBC # BLD: 0 /100 WBCS — SIGNIFICANT CHANGE UP (ref 0–0)
NRBC # FLD: 0 K/UL — SIGNIFICANT CHANGE UP (ref 0–0)
PHOSPHATE SERPL-MCNC: 3.5 MG/DL — SIGNIFICANT CHANGE UP (ref 2.5–4.5)
PLATELET # BLD AUTO: 304 K/UL — SIGNIFICANT CHANGE UP (ref 150–400)
POTASSIUM SERPL-MCNC: 3.9 MMOL/L — SIGNIFICANT CHANGE UP (ref 3.5–5.3)
POTASSIUM SERPL-SCNC: 3.9 MMOL/L — SIGNIFICANT CHANGE UP (ref 3.5–5.3)
RBC # BLD: 5.54 M/UL — SIGNIFICANT CHANGE UP (ref 4.2–5.8)
RBC # FLD: 14.4 % — SIGNIFICANT CHANGE UP (ref 10.3–14.5)
S AUREUS DNA NOSE QL NAA+PROBE: SIGNIFICANT CHANGE UP
SODIUM SERPL-SCNC: 134 MMOL/L — LOW (ref 135–145)
WBC # BLD: 7.33 K/UL — SIGNIFICANT CHANGE UP (ref 3.8–10.5)
WBC # FLD AUTO: 7.33 K/UL — SIGNIFICANT CHANGE UP (ref 3.8–10.5)

## 2023-05-30 PROCEDURE — 70551 MRI BRAIN STEM W/O DYE: CPT | Mod: 26

## 2023-05-30 PROCEDURE — 99239 HOSP IP/OBS DSCHRG MGMT >30: CPT

## 2023-05-30 RX ORDER — METOPROLOL TARTRATE 50 MG
0.5 TABLET ORAL
Qty: 30 | Refills: 0
Start: 2023-05-30 | End: 2023-06-28

## 2023-05-30 RX ORDER — CLOPIDOGREL BISULFATE 75 MG/1
1 TABLET, FILM COATED ORAL
Qty: 90 | Refills: 0
Start: 2023-05-30 | End: 2023-08-27

## 2023-05-30 RX ORDER — ATORVASTATIN CALCIUM 80 MG/1
1 TABLET, FILM COATED ORAL
Qty: 30 | Refills: 0
Start: 2023-05-30 | End: 2023-06-28

## 2023-05-30 RX ORDER — METOPROLOL TARTRATE 50 MG
12.5 TABLET ORAL
Refills: 0 | Status: DISCONTINUED | OUTPATIENT
Start: 2023-05-30 | End: 2023-05-30

## 2023-05-30 RX ADMIN — Medication 0: at 09:11

## 2023-05-30 RX ADMIN — CLOPIDOGREL BISULFATE 75 MILLIGRAM(S): 75 TABLET, FILM COATED ORAL at 12:09

## 2023-05-30 RX ADMIN — Medication 12.5 MILLIGRAM(S): at 18:29

## 2023-05-30 RX ADMIN — Medication 81 MILLIGRAM(S): at 12:11

## 2023-05-30 RX ADMIN — CHLORHEXIDINE GLUCONATE 1 APPLICATION(S): 213 SOLUTION TOPICAL at 12:08

## 2023-05-30 RX ADMIN — ENOXAPARIN SODIUM 40 MILLIGRAM(S): 100 INJECTION SUBCUTANEOUS at 12:09

## 2023-05-30 RX ADMIN — Medication 25 MICROGRAM(S): at 05:05

## 2023-05-30 NOTE — PHARMACOTHERAPY INTERVENTION NOTE - COMMENTS
Discharge medications were reviewed with the patient. Current medication schedule was discussed in detail including: medication name, indication, dose, administration times, treatment duration, side effects and special instructions. All questions and concerns were answered and addressed. Patient verbalized understanding and was provided with educational Gave pt DM2 handouts (A1c, basal/bolus insulin, hypoglycemia & treatment, healthy plate, nutrition facts label).    Vanna Amezcua, PharmD  Clinical Pharmacy Specialist  [facility] [id number]

## 2023-05-30 NOTE — DISCHARGE NOTE PROVIDER - DETAILS OF MALNUTRITION DIAGNOSIS/DIAGNOSES
This patient has been assessed with a concern for Malnutrition and was treated during this hospitalization for the following Nutrition diagnosis/diagnoses:     -  05/27/2023: Moderate protein-calorie malnutrition   No

## 2023-05-30 NOTE — DISCHARGE NOTE PROVIDER - HOSPITAL COURSE
72M with hx of CAD s/p CABG, HTN, HLD, T2DM who presents with slurred speech admitted for CVA workup. s/p code stroke on admission. Neurology was consulted. CTH and MRI brain did not show any findings of acute stroke. Per neurology possible TIA, started on asprin and plavix (plavix for 3 months only). PT saw patient and recommended home. Patient is stable for discharge, without outpatient follow up with neurology.

## 2023-05-30 NOTE — PROGRESS NOTE ADULT - PROBLEM SELECTOR PLAN 3
-sliding scale  -check hgbA1c
-sliding scale  -check hgbA1c
BP stable  - s/p period of permissive HTN  - resume metoprolol at lower dose 12.5mg BID given BP well controlled  - home meds had pill bottles of both metoprolol tartrate and succinate, educated that he should only take tartrate at lower dose as prescribed
-sliding scale  -check hgbA1c

## 2023-05-30 NOTE — PROGRESS NOTE ADULT - PROBLEM SELECTOR PLAN 2
-permissive HTN today  - home meds had pill bottles of both metoprolol tartrate 25BID and metoprolol succ 50daily  -educated that he should only take one of them
chronic stable  - c/w asa, statin  - resume metoprolol at lower dose 12.5mg BID given BP well controlled
-permissive HTN today  - home meds had pill bottles of both metoprolol tartrate 25BID and metoprolol succ 50daily  -educated that he should only take one of them
-permissive HTN today  - home meds had pill bottles of both metoprolol tartrate 25BID and metoprolol succ 50daily  -educated that he should only take one of them

## 2023-05-30 NOTE — PROGRESS NOTE ADULT - PROBLEM SELECTOR PROBLEM 3
DM2 (diabetes mellitus, type 2)
DM2 (diabetes mellitus, type 2)
HTN (hypertension)
DM2 (diabetes mellitus, type 2)

## 2023-05-30 NOTE — DISCHARGE NOTE PROVIDER - NSDCCPCAREPLAN_GEN_ALL_CORE_FT
PRINCIPAL DISCHARGE DIAGNOSIS  Diagnosis: Slurred speech  Assessment and Plan of Treatment: You were seen by neurology. CT scan and MRI of your brain were negative. Neurology feels you had something called "transient ischemic attack". Please take your aspirin, Please take plavix for 3 months only. Please follow up with neurology after discharge.

## 2023-05-30 NOTE — DISCHARGE NOTE NURSING/CASE MANAGEMENT/SOCIAL WORK - NSDCPEFALRISK_GEN_ALL_CORE
For information on Fall & Injury Prevention, visit: [email or web address] OR  [email or web address] OR  https://www.cdc.gov/steadi/patient.html

## 2023-05-30 NOTE — PROGRESS NOTE ADULT - NUTRITIONAL ASSESSMENT
This patient has been assessed with a concern for Malnutrition and has been determined to have a diagnosis/diagnoses of Moderate protein-calorie malnutrition.    This patient is being managed with:   Diet Regular-  Consistent Carbohydrate {Evening Snack} (CSTCHOSN)  DASH/TLC {Sodium & Cholesterol Restricted} (DASH)  Entered: May 26 2023  9:46AM  

## 2023-05-30 NOTE — PROGRESS NOTE ADULT - SUBJECTIVE AND OBJECTIVE BOX
Patient is a 72y old  Male who presents with a chief complaint of Slurred speech     (27 May 2023 11:45)      SUBJECTIVE / OVERNIGHT EVENTS: No acute events overnight. Pt reports tension/soreness in R neck. No other complaints     ADDITIONAL REVIEW OF SYSTEMS:    MEDICATIONS  (STANDING):  aspirin enteric coated 81 milliGRAM(s) Oral daily  atorvastatin 80 milliGRAM(s) Oral at bedtime  clopidogrel Tablet 75 milliGRAM(s) Oral daily  dextrose 5%. 1000 milliLiter(s) (100 mL/Hr) IV Continuous <Continuous>  dextrose 5%. 1000 milliLiter(s) (50 mL/Hr) IV Continuous <Continuous>  dextrose 50% Injectable 12.5 Gram(s) IV Push once  dextrose 50% Injectable 25 Gram(s) IV Push once  dextrose 50% Injectable 25 Gram(s) IV Push once  enoxaparin Injectable 40 milliGRAM(s) SubCutaneous every 24 hours  glucagon  Injectable 1 milliGRAM(s) IntraMuscular once  insulin lispro (ADMELOG) corrective regimen sliding scale   SubCutaneous three times a day before meals  insulin lispro (ADMELOG) corrective regimen sliding scale   SubCutaneous at bedtime  levothyroxine 25 MICROGram(s) Oral daily    MEDICATIONS  (PRN):  acetaminophen     Tablet .. 650 milliGRAM(s) Oral every 6 hours PRN Temp greater or equal to 38C (100.4F), Mild Pain (1 - 3), Moderate Pain (4 - 6)  dextrose Oral Gel 15 Gram(s) Oral once PRN Blood Glucose LESS THAN 70 milliGRAM(s)/deciliter      CAPILLARY BLOOD GLUCOSE      POCT Blood Glucose.: 138 mg/dL (27 May 2023 12:39)  POCT Blood Glucose.: 107 mg/dL (27 May 2023 08:39)  POCT Blood Glucose.: 140 mg/dL (26 May 2023 21:28)  POCT Blood Glucose.: 88 mg/dL (26 May 2023 18:01)  POCT Blood Glucose.: 100 mg/dL (26 May 2023 17:02)    I&O's Summary      PHYSICAL EXAM:  Vital Signs Last 24 Hrs  T(C): 36.4 (27 May 2023 11:30), Max: 36.7 (26 May 2023 17:25)  T(F): 97.6 (27 May 2023 11:30), Max: 98.1 (26 May 2023 17:25)  HR: 78 (27 May 2023 11:30) (62 - 78)  BP: 130/79 (27 May 2023 11:30) (130/74 - 147/80)  BP(mean): --  RR: 18 (27 May 2023 11:30) (17 - 19)  SpO2: 100% (27 May 2023 11:30) (97% - 100%)    Parameters below as of 27 May 2023 11:30  Patient On (Oxygen Delivery Method): room air      CONSTITUTIONAL: NAD, sitting in chair   EYES: PERRLA; conjunctiva and sclera clear  ENMT: Moist oral mucosa, no pharyngeal injection or exudates; normal dentition  NECK: Supple, no palpable masses; no thyromegaly  RESPIRATORY: Normal respiratory effort; lungs are clear to auscultation bilaterally  CARDIOVASCULAR: Regular rate and rhythm, normal S1 and S2, no murmur/rub/gallop; No lower extremity edema; Peripheral pulses are 2+ bilaterally  ABDOMEN: Nontender to palpation, normoactive bowel sounds, no rebound/guarding; No hepatosplenomegaly  MUSCULOSKELETAL:  No tenderness to palpation over R neck, no rash noted over are where pt complaining of pain, Normal gait; no clubbing or cyanosis of digits; no joint swelling or tenderness to palpation  PSYCH: A+O to person, place, and time; affect appropriate  NEUROLOGY: CN 2-12 are intact and symmetric; no gross sensory deficits   SKIN: Rash over right chest wall, scabbed over    LABS:                        14.4   7.93  )-----------( 247      ( 27 May 2023 03:20 )             44.2     05-27    136  |  100  |  19  ----------------------------<  101<H>  4.1   |  23  |  0.99    Ca    9.3      27 May 2023 03:20    TPro  7.6  /  Alb  4.0  /  TBili  1.4<H>  /  DBili  x   /  AST  31  /  ALT  24  /  AlkPhos  66  05-26    PT/INR - ( 26 May 2023 01:10 )   PT: 14.0 sec;   INR: 1.20 ratio         PTT - ( 26 May 2023 01:10 )  PTT:31.3 sec            RADIOLOGY & ADDITIONAL TESTS:  Results Reviewed:   Imaging Personally Reviewed:  Electrocardiogram Personally Reviewed:    COORDINATION OF CARE:  Care Discussed with Consultants/Other Providers [Y/N]:  Prior or Outpatient Records Reviewed [Y/N]:  
Kerry Ahmadi MD  [facility] Division of Hospital Medicine  Pager [phone] (M-F 8AM-5PM)  Other Times: [phone]    Patient is a 72y old  Male who presents with a chief complaint of slurred speech (30 May 2023 12:27)    SUBJECTIVE / OVERNIGHT EVENTS: patient feeling well, no complaints     MEDICATIONS  (STANDING):  aspirin enteric coated 81 milliGRAM(s) Oral daily  atorvastatin 80 milliGRAM(s) Oral at bedtime  chlorhexidine 2% Cloths 1 Application(s) Topical daily  clopidogrel Tablet 75 milliGRAM(s) Oral daily  dextrose 5%. 1000 milliLiter(s) (100 mL/Hr) IV Continuous <Continuous>  dextrose 5%. 1000 milliLiter(s) (50 mL/Hr) IV Continuous <Continuous>  dextrose 50% Injectable 12.5 Gram(s) IV Push once  dextrose 50% Injectable 25 Gram(s) IV Push once  dextrose 50% Injectable 25 Gram(s) IV Push once  enoxaparin Injectable 40 milliGRAM(s) SubCutaneous every 24 hours  glucagon  Injectable 1 milliGRAM(s) IntraMuscular once  insulin lispro (ADMELOG) corrective regimen sliding scale   SubCutaneous at bedtime  insulin lispro (ADMELOG) corrective regimen sliding scale   SubCutaneous three times a day before meals  levothyroxine 25 MICROGram(s) Oral daily  metoprolol tartrate 12.5 milliGRAM(s) Oral two times a day    MEDICATIONS  (PRN):  acetaminophen     Tablet .. 650 milliGRAM(s) Oral every 6 hours PRN Temp greater or equal to 38C (100.4F), Mild Pain (1 - 3), Moderate Pain (4 - 6)  cyclobenzaprine 5 milliGRAM(s) Oral three times a day PRN Muscle Spasm  dextrose Oral Gel 15 Gram(s) Oral once PRN Blood Glucose LESS THAN 70 milliGRAM(s)/deciliter      PHYSICAL EXAM:  Vital Signs Last 24 Hrs  T(C): 36.8 (30 May 2023 07:30), Max: 37.1 (29 May 2023 19:34)  T(F): 98.2 (30 May 2023 07:30), Max: 98.8 (29 May 2023 19:34)  HR: 71 (30 May 2023 07:30) (70 - 73)  BP: 127/80 (30 May 2023 07:30) (103/63 - 127/80)  BP(mean): --  RR: 17 (30 May 2023 07:30) (17 - 18)  SpO2: 100% (30 May 2023 07:30) (98% - 100%)    Parameters below as of 30 May 2023 07:30  Patient On (Oxygen Delivery Method): room air        CONSTITUTIONAL: NAD, well-developed, well-groomed  RESPIRATORY: Normal respiratory effort; lungs are clear to auscultation bilaterally  CARDIOVASCULAR: Regular rate and rhythm, normal S1 and S2, no murmur/rub/gallop; No lower extremity edema  GASTROINTESTINAL: Nontender to palpation, normoactive bowel sounds, no rebound/guarding; No hepatosplenomegaly  MUSCULOSKELETAL:  no clubbing or cyanosis of digits; no joint swelling or tenderness to palpation  NEUROLOGY: non-focal; no gross sensory deficits   PSYCH: A+O to person, place, and time; affect appropriate  SKIN: No rashes; warm     LABS:                        14.1   7.33  )-----------( 304      ( 30 May 2023 05:20 )             43.2     05-30    134<L>  |  100  |  19  ----------------------------<  92  3.9   |  22  |  1.07    Ca    9.1      30 May 2023 05:20  Phos  3.5     05-30  Mg     2.40     05-30                  RADIOLOGY & ADDITIONAL TESTS:  Results Reviewed:   Imaging Personally Reviewed:  Electrocardiogram Personally Reviewed:    COORDINATION OF CARE:  Care Discussed with Consultants/Other Providers [Y/N]:  Prior or Outpatient Records Reviewed [Y/N]:  
Patient is a 72y old  Male who presents with a chief complaint of slurred speech (28 May 2023 14:11)      SUBJECTIVE / OVERNIGHT EVENTS: No acute events overnight. No new complaints Still has some R neck pain though slightly improved. Describes pain as squeezing/tight pain    ADDITIONAL REVIEW OF SYSTEMS:    MEDICATIONS  (STANDING):  aspirin enteric coated 81 milliGRAM(s) Oral daily  atorvastatin 80 milliGRAM(s) Oral at bedtime  chlorhexidine 2% Cloths 1 Application(s) Topical daily  clopidogrel Tablet 75 milliGRAM(s) Oral daily  dextrose 5%. 1000 milliLiter(s) (50 mL/Hr) IV Continuous <Continuous>  dextrose 5%. 1000 milliLiter(s) (100 mL/Hr) IV Continuous <Continuous>  dextrose 50% Injectable 25 Gram(s) IV Push once  dextrose 50% Injectable 12.5 Gram(s) IV Push once  dextrose 50% Injectable 25 Gram(s) IV Push once  enoxaparin Injectable 40 milliGRAM(s) SubCutaneous every 24 hours  glucagon  Injectable 1 milliGRAM(s) IntraMuscular once  insulin lispro (ADMELOG) corrective regimen sliding scale   SubCutaneous at bedtime  insulin lispro (ADMELOG) corrective regimen sliding scale   SubCutaneous three times a day before meals  levothyroxine 25 MICROGram(s) Oral daily    MEDICATIONS  (PRN):  acetaminophen     Tablet .. 650 milliGRAM(s) Oral every 6 hours PRN Temp greater or equal to 38C (100.4F), Mild Pain (1 - 3), Moderate Pain (4 - 6)  cyclobenzaprine 5 milliGRAM(s) Oral three times a day PRN Muscle Spasm  dextrose Oral Gel 15 Gram(s) Oral once PRN Blood Glucose LESS THAN 70 milliGRAM(s)/deciliter      CAPILLARY BLOOD GLUCOSE      POCT Blood Glucose.: 113 mg/dL (29 May 2023 12:08)  POCT Blood Glucose.: 108 mg/dL (29 May 2023 08:55)  POCT Blood Glucose.: 107 mg/dL (28 May 2023 22:10)  POCT Blood Glucose.: 121 mg/dL (28 May 2023 21:23)  POCT Blood Glucose.: 110 mg/dL (28 May 2023 18:04)  POCT Blood Glucose.: 98 mg/dL (28 May 2023 17:35)    I&O's Summary      PHYSICAL EXAM:  Vital Signs Last 24 Hrs  T(C): 36.7 (29 May 2023 12:25), Max: 37.1 (28 May 2023 19:50)  T(F): 98.1 (29 May 2023 12:25), Max: 98.7 (28 May 2023 19:50)  HR: 78 (29 May 2023 12:25) (77 - 82)  BP: 112/66 (29 May 2023 12:25) (112/66 - 131/78)  BP(mean): --  RR: 18 (29 May 2023 12:25) (16 - 18)  SpO2: 99% (29 May 2023 12:25) (96% - 100%)    Parameters below as of 29 May 2023 07:30  Patient On (Oxygen Delivery Method): room air      CONSTITUTIONAL: NAD  EYES: PERRLA; conjunctiva and sclera clear  NECK: Supple, no palpable masses; no thyromegaly, No tenderness to palpation on R side, no visible rash in area of reported pain   RESPIRATORY: Normal respiratory effort; lungs are clear to auscultation bilaterally  CARDIOVASCULAR: Regular rate and rhythm, normal S1 and S2, no murmur/rub/gallop; No lower extremity edema; Peripheral pulses are 2+ bilaterally  ABDOMEN: Nontender to palpation, normoactive bowel sounds, no rebound/guarding; No hepatosplenomegaly  MUSCULOSKELETAL:  Normal gait; no clubbing or cyanosis of digits; no joint swelling or tenderness to palpation  PSYCH: A+O to person, place, and time; affect appropriate  NEUROLOGY: CN 2-12 are intact and symmetric; no gross sensory deficits   SKIN: Rash over right chest wall, scabbed over      LABS:                        13.8   8.16  )-----------( 290      ( 29 May 2023 06:00 )             42.9     05-29    132<L>  |  100  |  24<H>  ----------------------------<  99  3.9   |  22  |  1.08    Ca    9.0      29 May 2023 06:00  Phos  3.4     05-29  Mg     2.20     05-29                  RADIOLOGY & ADDITIONAL TESTS:  Results Reviewed:   Imaging Personally Reviewed:  Electrocardiogram Personally Reviewed:    COORDINATION OF CARE:  Care Discussed with Consultants/Other Providers [Y/N]:  Prior or Outpatient Records Reviewed [Y/N]:  
Patient is a 72y old  Male who presents with a chief complaint of slurred speech (27 May 2023 14:18)      SUBJECTIVE / OVERNIGHT EVENTS: No acute events overnight. Pt has no new complaints. Neck pain improving    ADDITIONAL REVIEW OF SYSTEMS:    MEDICATIONS  (STANDING):  aspirin enteric coated 81 milliGRAM(s) Oral daily  atorvastatin 80 milliGRAM(s) Oral at bedtime  chlorhexidine 2% Cloths 1 Application(s) Topical daily  clopidogrel Tablet 75 milliGRAM(s) Oral daily  dextrose 5%. 1000 milliLiter(s) (50 mL/Hr) IV Continuous <Continuous>  dextrose 5%. 1000 milliLiter(s) (100 mL/Hr) IV Continuous <Continuous>  dextrose 50% Injectable 25 Gram(s) IV Push once  dextrose 50% Injectable 12.5 Gram(s) IV Push once  dextrose 50% Injectable 25 Gram(s) IV Push once  enoxaparin Injectable 40 milliGRAM(s) SubCutaneous every 24 hours  glucagon  Injectable 1 milliGRAM(s) IntraMuscular once  insulin lispro (ADMELOG) corrective regimen sliding scale   SubCutaneous three times a day before meals  insulin lispro (ADMELOG) corrective regimen sliding scale   SubCutaneous at bedtime  levothyroxine 25 MICROGram(s) Oral daily    MEDICATIONS  (PRN):  acetaminophen     Tablet .. 650 milliGRAM(s) Oral every 6 hours PRN Temp greater or equal to 38C (100.4F), Mild Pain (1 - 3), Moderate Pain (4 - 6)  dextrose Oral Gel 15 Gram(s) Oral once PRN Blood Glucose LESS THAN 70 milliGRAM(s)/deciliter      CAPILLARY BLOOD GLUCOSE      POCT Blood Glucose.: 174 mg/dL (28 May 2023 12:34)  POCT Blood Glucose.: 95 mg/dL (28 May 2023 08:55)  POCT Blood Glucose.: 114 mg/dL (27 May 2023 22:14)  POCT Blood Glucose.: 111 mg/dL (27 May 2023 17:54)    I&O's Summary      PHYSICAL EXAM:  Vital Signs Last 24 Hrs  T(C): 37 (28 May 2023 12:14), Max: 37 (28 May 2023 12:14)  T(F): 98.6 (28 May 2023 12:14), Max: 98.6 (28 May 2023 12:14)  HR: 70 (28 May 2023 12:14) (70 - 87)  BP: 130/75 (28 May 2023 12:14) (116/80 - 157/89)  BP(mean): --  RR: 18 (28 May 2023 12:14) (17 - 18)  SpO2: 98% (28 May 2023 12:14) (98% - 100%)    Parameters below as of 28 May 2023 11:30  Patient On (Oxygen Delivery Method): room air      CONSTITUTIONAL: NAD  EYES: PERRLA; conjunctiva and sclera clear  ENMT: Moist oral mucosa, no pharyngeal injection or exudates; normal dentition  NECK: Supple, no palpable masses; no thyromegaly  RESPIRATORY: Normal respiratory effort; lungs are clear to auscultation bilaterally  CARDIOVASCULAR: Regular rate and rhythm, normal S1 and S2, no murmur/rub/gallop; No lower extremity edema; Peripheral pulses are 2+ bilaterally  ABDOMEN: Nontender to palpation, normoactive bowel sounds, no rebound/guarding; No hepatosplenomegaly  MUSCULOSKELETAL:  Normal gait; no clubbing or cyanosis of digits; no joint swelling or tenderness to palpation  PSYCH: A+O to person, place, and time; affect appropriate  NEUROLOGY: CN 2-12 are intact and symmetric; no gross sensory deficits   SKIN: No rashes; no palpable lesions    LABS:                        14.4   7.17  )-----------( 275      ( 28 May 2023 06:20 )             44.6     05-28    138  |  103  |  21  ----------------------------<  97  4.2   |  23  |  1.06    Ca    9.4      28 May 2023 06:20  Phos  3.8     05-28  Mg     2.20     05-28                  RADIOLOGY & ADDITIONAL TESTS:  Results Reviewed:   Imaging Personally Reviewed:  Electrocardiogram Personally Reviewed:    COORDINATION OF CARE:  Care Discussed with Consultants/Other Providers [Y/N]:  Prior or Outpatient Records Reviewed [Y/N]:

## 2023-05-30 NOTE — CHART NOTE - NSCHARTNOTEFT_GEN_A_CORE
MRI result discussed with neuro PA Suzette Porras, pt cleared for discharge from neuro standpoint, outpatient followup with Linette Melendez. ASA/plavix for 3 months then asa indefinitely MRI result discussed with neuro PA Suzette Proras, pt with possible TIA. Pt cleared for discharge from neuro standpoint, outpatient followup with Linette Melendez. ASA/plavix for 3 months then asa indefinitely

## 2023-05-30 NOTE — PROGRESS NOTE ADULT - ASSESSMENT
73 y/o M with PMH of DM2, HTN, HLD, CAD s/p CABG presents with slurred speech admitted for CVA workup
72M with hx of CAD s/p CABG, HTN, HLD, T2DM who presents with dysarthria likely d/t TIA.
71 y/o M with PMH of DM2, HTN, HLD, CAD s/p CABG presents with slurred speech admitted for CVA workup
71 y/o M with PMH of DM2, HTN, HLD, CAD s/p CABG presents with slurred speech admitted for CVA workup

## 2023-05-30 NOTE — PROGRESS NOTE ADULT - PROBLEM SELECTOR PLAN 4
-c/w asa 81 and statin  -restart BB tomorrow
A1C 6%  - FS/SSI qac and hs

## 2023-05-30 NOTE — DISCHARGE NOTE PROVIDER - NSDCFUADDAPPT_GEN_ALL_CORE_FT
neurology clinic will call you with appointment time, if you do not get a call in 1-2 days please call the clinic to followup  you had a heart ultrasound that is concern for PFO, followup with your cardiologist Dr Hall for monitor  neurology clinic (Linette Melendez NP) will call you with appointment time, if you do not get a call in 1-2 days please call the clinic to followup  you had a heart ultrasound that is concern for PFO, followup with your cardiologist Dr Hall for monitor

## 2023-05-30 NOTE — PROGRESS NOTE ADULT - PROBLEM SELECTOR PLAN 6
-noted to have right chest wall rash which is scabbed over  -patient noted he developed rash a week ago  -denies pain or pruritis  -most consistent with resolving zoster rash - Appears to be limited to C5 dermatome  - Neck pain does not appear to be associated with the rash. Suspect musculoskeletal pain. Cont Tylenol prn. Will add Flexeril prn. Warm compress
-noted to have right chest wall rash which is scabbed over  -patient noted he developed rash a week ago  -denies pain or pruritis  -most consistent with resolving zoster rash - Appears to be limited to C5 dermatome  - Neck pain does not appear to be associated with the rash. Suspect musculoskeletal pain. Tylenol prn
noted to have right chest wall rash which is scabbed over  - patient noted he developed rash a week ago, denies pain or pruritis  - most consistent with resolving zoster rash - appears to be limited to C5 dermatome  - neck pain does not appear to be associated with the rash, now resolved, suspect musculoskeletal pain, Tylenol PRN, warm compress PRN
-noted to have right chest wall rash which is scabbed over  -patient noted he developed rash a week ago  -denies pain or pruritis  -most consistent with resolving zoster rash - Appears to be limited to C5 dermatome  - Neck pain does not appear to be associated with the rash. Suspect musculoskeletal pain. Tylenol prn

## 2023-05-30 NOTE — PROGRESS NOTE ADULT - PROBLEM SELECTOR PLAN 1
-patient presents with slurred speech admitted for CVA workup  -no acute CVA noted on CTH, MRI pending  -c/w asa 81 and lipitor 80  -TTE with bubble study concerning for possible shunt, f/u neuro if need for EULALIA   -permissive HTN  -tele monitoring  -PT/OT  -neuro following
-patient presents with slurred speech admitted for CVA workup  -no acute CVA noted on CTH, MRI pending  -c/w asa 81 and lipitor 80  -TTE with bubble study concerning for possible shunt, f/u neuro if need for EULALIA   -permissive HTN  -tele monitoring  -PT/OT  -neuro following
presented with dysarthria now resolved  - CTH with no acute findings, MRI brain no CVA  - TTE with bubble study with possible PFO, per neuro no need for EULALIA given MRI negative, outpatient follow up with cardiology for monitoring   - c/w asa, plavix for 3 months then stop per neuro  - PT recommended home   - outpatient follow up with neurology
-patient presents with slurred speech admitted for CVA workup  -no acute CVA noted on CTH, MRI pending  -c/w asa 81 and lipitor 80  -TTE with bubble study concerning for possible shunt, f/u neuro if need for EULALIA   -permissive HTN  -tele monitoring  -PT/OT  -neuro following

## 2023-05-30 NOTE — PROGRESS NOTE ADULT - PROBLEM SELECTOR PROBLEM 4
DM2 (diabetes mellitus, type 2)
CAD (coronary artery disease)

## 2023-05-30 NOTE — DISCHARGE NOTE NURSING/CASE MANAGEMENT/SOCIAL WORK - NSDCFUADDAPPT_GEN_ALL_CORE_FT
neurology clinic (Linette Melendez NP) will call you with appointment time, if you do not get a call in 1-2 days please call the clinic to followup  you had a heart ultrasound that is concern for PFO, followup with your cardiologist Dr Hall for monitor

## 2023-05-30 NOTE — PROGRESS NOTE ADULT - PROBLEM SELECTOR PLAN 5
- c/w synthroid
-c/w home synthroid  -check TSH

## 2023-05-30 NOTE — DISCHARGE NOTE NURSING/CASE MANAGEMENT/SOCIAL WORK - PATIENT PORTAL LINK FT
You can access the FollowMyHealth Patient Portal offered by [facility] by registering at the following website: [email or web address]. By joining [facility]’s FollowMyHealth portal, you will also be able to view your health information using other applications (apps) compatible with our system.

## 2023-05-30 NOTE — DISCHARGE NOTE PROVIDER - NSDCMRMEDTOKEN_GEN_ALL_CORE_FT
aspirin 81 mg oral delayed release tablet: 1 tab(s) orally once a day  atorvastatin 80 mg oral tablet: 0.5 tab(s) orally once a day (at bedtime)  metFORMIN 500 mg oral tablet: 1 tab(s) orally 2 times a day     take before breakfast &amp; before dinner  metoprolol tartrate 25 mg oral tablet: 1 tab(s) orally 2 times a day  Synthroid 25 mcg (0.025 mg) oral tablet: 1 orally   aspirin 81 mg oral delayed release tablet: 1 tab(s) orally once a day  atorvastatin 80 mg oral tablet: 1 tab(s) orally once a day (at bedtime)  clopidogrel 75 mg oral tablet: 1 tab(s) orally once a day last date 8/26  metFORMIN 500 mg oral tablet: 1 tab(s) orally 2 times a day     take before breakfast &amp; before dinner  metoprolol tartrate 25 mg oral tablet: 0.5 tab(s) orally 2 times a day  Synthroid 25 mcg (0.025 mg) oral tablet: 1 orally

## 2023-05-30 NOTE — DISCHARGE NOTE PROVIDER - CARE PROVIDER_API CALL
Linette Melendez  NP in Family Health  [address] [facility], [address]  [place] [address]  Phone: [phone]  Fax: [phone]  Follow Up Time:     Nicole Hall)  Medicine  Cardiology  [address] [place] [address], [address]  [place], [place] [address]  Phone: [phone]  Fax: [phone]  Follow Up Time:

## 2023-05-31 ENCOUNTER — TRANSCRIPTION ENCOUNTER (OUTPATIENT)
Age: 73
End: 2023-05-31

## 2023-05-31 ENCOUNTER — NON-APPOINTMENT (OUTPATIENT)
Age: 73
End: 2023-05-31

## 2023-06-02 ENCOUNTER — APPOINTMENT (OUTPATIENT)
Dept: INTERNAL MEDICINE | Facility: CLINIC | Age: 73
End: 2023-06-02
Payer: MEDICARE

## 2023-06-02 VITALS
OXYGEN SATURATION: 98 % | TEMPERATURE: 97.7 F | HEART RATE: 75 BPM | BODY MASS INDEX: 19.73 KG/M2 | SYSTOLIC BLOOD PRESSURE: 120 MMHG | DIASTOLIC BLOOD PRESSURE: 76 MMHG | HEIGHT: 64.5 IN | WEIGHT: 117 LBS

## 2023-06-02 PROCEDURE — 99214 OFFICE O/P EST MOD 30 MIN: CPT

## 2023-06-05 ENCOUNTER — APPOINTMENT (OUTPATIENT)
Dept: NEUROLOGY | Facility: CLINIC | Age: 73
End: 2023-06-05
Payer: MEDICARE

## 2023-06-05 VITALS
DIASTOLIC BLOOD PRESSURE: 86 MMHG | HEART RATE: 77 BPM | SYSTOLIC BLOOD PRESSURE: 150 MMHG | WEIGHT: 120 LBS | BODY MASS INDEX: 20.24 KG/M2 | HEIGHT: 64.5 IN

## 2023-06-05 DIAGNOSIS — I63.9 CEREBRAL INFARCTION, UNSPECIFIED: ICD-10-CM

## 2023-06-05 PROCEDURE — 99215 OFFICE O/P EST HI 40 MIN: CPT

## 2023-06-05 RX ORDER — FAMOTIDINE 40 MG/1
40 TABLET, FILM COATED ORAL
Qty: 30 | Refills: 4 | Status: ACTIVE | COMMUNITY
Start: 2023-06-05 | End: 1900-01-01

## 2023-06-06 ENCOUNTER — TRANSCRIPTION ENCOUNTER (OUTPATIENT)
Age: 73
End: 2023-06-06

## 2023-06-14 NOTE — ED ADULT NURSE REASSESSMENT NOTE - NSFALLCONCLUSION_ED_ALL_ED
Universal Safety Interventions [FreeTextEntry1] : 48 yo female, referred for initial  colon cancer screening. She was previously evaluated by Dr. Landers GYN for papsmear which she states was normal  She is up to date with mammograms. She has normal bms, and no family hx of colon cancer. No sob or cp. \par \par \par IMP:\par average risk for colon cancer screening\par \par PLAN:\par She was advised to undergo colonoscopy to which she  agreed. The procedure will be performed in [facility] with the assistance of an anesthesiologist. The procedure was explained in detail and she understood the risks of the procedure not limited  to infection, bleeding, perforation, risk of anesthesia and risk of IV site problems,emergency surgery, missed lesions  or non-diagnosis of colon cancer. She  was advised that she could not drive home alone, if the patient chooses to receive sedation. Further diagnostic and treatment recommendations will be based upon the procedure and any biopsies, if they are taken.\par

## 2023-06-19 NOTE — H&P ADULT - NSCORESITESY/N_GEN_A_CORE_RD
[facility] Care Coordination Contact    [facility]  Ambulatory Care Coordination to Inpatient Care Management   Hand-In Communication    Date:  June 19, 2023  Name: Alejandrina Whatley is enrolled in [facility] Care Coordination program and I am the Lead Care Coordinator.  CC Contact Information:.   Payor Source: Payor: [facility] / Plan: [facility] DUAL / Product Type: HMO /   Current services in place:     Please see the CC Snaphot and Care Management Flowsheets for specific  details of this Alejandrina Whatley care plan.   Additional details/specific concerns r/t this admission:    Readmission Risk has history of frequent UTI infections.     I will follow this admission in Epic. Please feel free to contact me with questions or for further collaboration in discharge planning.    Alba Haines RN, PHN  Intuitional Care Coordinator [facility]  Cell [phone] Fax [phone]         No

## 2023-07-27 NOTE — HISTORY OF PRESENT ILLNESS
[Post-hospitalization from ___ Hospital] : Post-hospitalization from [unfilled] Hospital [Admitted on: ___] : The patient was admitted on [unfilled] [Discharged on ___] : discharged on [unfilled] [Discharge Summary] : discharge summary [Pertinent Labs] : pertinent labs [Radiology Findings] : radiology findings [Discharge Med List] : discharge medication list [Med Reconciliation] : medication reconciliation has been completed [Patient Contacted By: ____] : and contacted by [unfilled] [FreeTextEntry2] : Mr. JARVIS is a 72 year old male who presents to the office for a post-discharge visit after being admitted to [facility] from 5/26 to 5/30 for an episode of slurred speech.\par \par 72M with hx of CAD s/p CABG, HTN, HLD, T2DM who presents with slurred speech\par Admitted for CVA workup. s/p code stroke on admission. Neurology was consulted.\par CTH and MRI brain did not show any findings of acute stroke. \par Per neurology possible TIA, started on asprin and plavix (plavix for 3 months only). \par \par Pt thought he didn’t have slurred speech - sister got concerned which prompted visit to ED\par Currently - feeling okay, no complaints\par \par Discharge Medications:\par aspirin 81 mg\par atorvastatin 80 mg oral tablet: 1 tab(s) orally once a day (at bedtime)\par clopidogrel 75 mg oral tablet: 1 tab(s) orally once a day last date 8/26\par metFORMIN 500 mg oral tablet: 1 tab(s) orally 2 times a day\par metoprolol tartrate 25 mg oral tablet: 0.5 tab(s) orally 2 times a day\par Synthroid 25 mcg (0.025 mg) oral tablet: 1 orally\par \par F/UP:\par Nicole Hall)\par Medicine Cardiology\par [address] [place] [address], [address]\par [place], [place] [id number]\par Phone: [phone]\par Fax: [phone]\par \par \par Linette Melendez\par NP in Family Health\par [address] [facility], [address]\[place] [address]\par Phone: [phone]\par Fax: [phone]\par

## 2023-07-27 NOTE — ASSESSMENT
[FreeTextEntry1] : -Patient feeling well, no complaints\par -Tolerating medication regimen, no adverse effects\par -F/up appointments as above, cardiology\par -BP well controlled

## 2023-09-20 ENCOUNTER — NON-APPOINTMENT (OUTPATIENT)
Age: 73
End: 2023-09-20

## 2023-09-20 ENCOUNTER — APPOINTMENT (OUTPATIENT)
Dept: CARDIOLOGY | Facility: CLINIC | Age: 73
End: 2023-09-20
Payer: MEDICARE

## 2023-09-20 VITALS
HEIGHT: 64 IN | SYSTOLIC BLOOD PRESSURE: 156 MMHG | DIASTOLIC BLOOD PRESSURE: 72 MMHG | OXYGEN SATURATION: 95 % | HEART RATE: 77 BPM

## 2023-09-20 PROCEDURE — 99214 OFFICE O/P EST MOD 30 MIN: CPT | Mod: 25

## 2023-09-20 PROCEDURE — 93000 ELECTROCARDIOGRAM COMPLETE: CPT

## 2023-09-20 RX ORDER — KRILL/OM-3/DHA/EPA/PHOSPHO/AST 1000-230MG
81 CAPSULE ORAL
Qty: 90 | Refills: 1 | Status: DISCONTINUED | COMMUNITY
Start: 2023-03-22 | End: 2023-09-20

## 2023-09-20 RX ORDER — CLOPIDOGREL BISULFATE 75 MG/1
75 TABLET, FILM COATED ORAL
Qty: 90 | Refills: 0 | Status: DISCONTINUED | COMMUNITY
Start: 2023-05-31 | End: 2023-09-20

## 2023-10-09 ENCOUNTER — RX RENEWAL (OUTPATIENT)
Age: 73
End: 2023-10-09

## 2023-10-18 NOTE — HEALTH RISK ASSESSMENT
New consult received and patient seen   Detailed note to follow [No] : In the past 12 months have you used drugs other than those required for medical reasons? No [No falls in past year] : Patient reported no falls in the past year [0] : 2) Feeling down, depressed, or hopeless: Not at all (0) [PHQ-2 Negative - No further assessment needed] : PHQ-2 Negative - No further assessment needed [Patient declined bone density test] : Patient declined bone density test [Patient declined colonoscopy] : Patient declined colonoscopy [None] : None [With Family] : lives with family [On disability] : on disability [Single] : single [Feels Safe at Home] : Feels safe at home [Fully functional (bathing, dressing, toileting, transferring, walking, feeding)] : Fully functional (bathing, dressing, toileting, transferring, walking, feeding) [Fully functional (using the telephone, shopping, preparing meals, housekeeping, doing laundry, using] : Fully functional and needs no help or supervision to perform IADLs (using the telephone, shopping, preparing meals, housekeeping, doing laundry, using transportation, managing medications and managing finances) [Independent] : using telephone [Some assistance needed] : managing finances [Full assistance needed] : managing medications [Smoke Detector] : smoke detector [Carbon Monoxide Detector] : carbon monoxide detector [Seat Belt] :  uses seat belt [Sunscreen] : uses sunscreen [With Patient/Caregiver] : , with patient/caregiver [Very Good] : ~his/her~  mood as very good [Former] : Former [de-identified] : walking [de-identified] : balanced [[id number]] : 0 [Reports changes in hearing] : Reports no changes in hearing [Reports changes in vision] : Reports no changes in vision [Reports normal functional visual acuity (ie: able to read med bottle)] : Reports poor functional visual acuity.  [Reviewed no changes] : Reviewed, no changes [AdvancecareDate] : 01/22 [FreeTextEntry4] : HCP form provided in the past.

## 2023-10-23 NOTE — DIETITIAN NUTRITION RISK NOTIFICATION - MUSCLE MASS (LOSS OF MUSCLE)
Department of Anesthesiology  Postprocedure Note    Patient: Heather Raman  MRN: [id number]  YOB: 1944  Date of evaluation: 10/23/2023      Procedure Summary     Date: 10/23/23 Room / Location: [facility] 03 / [address] [facility]    Anesthesia Start: 5549 Anesthesia Stop: 2235    Procedure: bilateral Lumbar 2-3, 3-4 facet radiofrequency ablation (Bilateral) Diagnosis:       Lumbar spondylosis      (Lumbar spondylosis [F84.739])    Surgeons: Ronald Theodore MD Responsible Provider: Santhosh Kaufman MD    Anesthesia Type: MAC ASA Status: 3          Anesthesia Type: No value filed.     Melissa Phase I: Melissa Score: 10    Melissa Phase II: Melissa Score: 9      Anesthesia Post Evaluation    Complications: no
Temples...

## 2023-10-30 NOTE — PHYSICAL THERAPY INITIAL EVALUATION ADULT - BALANCE DISTURBANCE, SYSTEM IMPAIRMENT CONTRIBUTE, REHAB EVAL
Submitted PA for TRULICITY 4.5 OP/5.2NS Via CMM Key: [id number] STATUS: PENDING. Follow up done daily; if no response in three days we will refax for status check. If another three days goes by with no response we will call the insurance for status. musculoskeletal

## 2023-11-15 NOTE — CONSULT NOTE ADULT - CONSULT REQUESTED DATE/TIME
26-May-2023 01:30 Topical Steroids Applications Pregnancy And Lactation Text: Most topical steroids are considered safe to use during pregnancy and lactation.  Any topical steroid applied to the breast or nipple should be washed off before breastfeeding.

## 2023-11-24 ENCOUNTER — APPOINTMENT (OUTPATIENT)
Dept: INTERNAL MEDICINE | Facility: CLINIC | Age: 73
End: 2023-11-24
Payer: MEDICARE

## 2023-11-24 VITALS
DIASTOLIC BLOOD PRESSURE: 82 MMHG | OXYGEN SATURATION: 98 % | WEIGHT: 136 LBS | HEART RATE: 81 BPM | TEMPERATURE: 97.6 F | HEIGHT: 64 IN | BODY MASS INDEX: 23.22 KG/M2 | SYSTOLIC BLOOD PRESSURE: 150 MMHG

## 2023-11-24 VITALS — SYSTOLIC BLOOD PRESSURE: 128 MMHG | DIASTOLIC BLOOD PRESSURE: 72 MMHG

## 2023-11-24 DIAGNOSIS — I10 ESSENTIAL (PRIMARY) HYPERTENSION: ICD-10-CM

## 2023-11-24 DIAGNOSIS — E11.9 TYPE 2 DIABETES MELLITUS W/OUT COMPLICATIONS: ICD-10-CM

## 2023-11-24 DIAGNOSIS — E03.8 OTHER SPECIFIED HYPOTHYROIDISM: ICD-10-CM

## 2023-11-24 DIAGNOSIS — Z00.00 ENCOUNTER FOR GENERAL ADULT MEDICAL EXAMINATION W/OUT ABNORMAL FINDINGS: ICD-10-CM

## 2023-11-24 DIAGNOSIS — Z95.1 PRESENCE OF AORTOCORONARY BYPASS GRAFT: ICD-10-CM

## 2023-11-24 PROCEDURE — G0439: CPT

## 2023-11-24 RX ORDER — UBIDECARENONE 100 MG
100 CAPSULE ORAL
Qty: 90 | Refills: 2 | Status: ACTIVE | COMMUNITY
Start: 2022-12-07 | End: 1900-01-01

## 2023-11-24 RX ORDER — ATORVASTATIN CALCIUM 40 MG/1
40 TABLET, FILM COATED ORAL
Qty: 90 | Refills: 2 | Status: ACTIVE | COMMUNITY
Start: 2021-09-15 | End: 1900-01-01

## 2023-11-24 RX ORDER — LEVOTHYROXINE SODIUM 0.03 MG/1
25 TABLET ORAL
Qty: 90 | Refills: 2 | Status: ACTIVE | COMMUNITY
Start: 2022-12-13 | End: 1900-01-01

## 2023-11-24 RX ORDER — METFORMIN ER 500 MG 500 MG/1
500 TABLET ORAL
Qty: 90 | Refills: 2 | Status: ACTIVE | COMMUNITY
Start: 2021-10-15 | End: 1900-01-01

## 2023-11-24 RX ORDER — UBIDECARENONE/VIT E ACET 100MG-5
50 MCG CAPSULE ORAL
Qty: 90 | Refills: 2 | Status: ACTIVE | COMMUNITY
Start: 2021-10-16 | End: 1900-01-01

## 2023-11-24 RX ORDER — MULTIVITAMIN WITH FOLIC ACID 400 MCG
TABLET ORAL
Qty: 90 | Refills: 2 | Status: ACTIVE | COMMUNITY
Start: 2022-11-25 | End: 1900-01-01

## 2023-12-14 ENCOUNTER — APPOINTMENT (OUTPATIENT)
Dept: NEUROLOGY | Facility: CLINIC | Age: 73
End: 2023-12-14
Payer: MEDICARE

## 2023-12-14 VITALS
BODY MASS INDEX: 23.9 KG/M2 | DIASTOLIC BLOOD PRESSURE: 73 MMHG | SYSTOLIC BLOOD PRESSURE: 158 MMHG | HEIGHT: 64 IN | WEIGHT: 140 LBS | HEART RATE: 71 BPM

## 2023-12-14 DIAGNOSIS — I65.29 OCCLUSION AND STENOSIS OF UNSPECIFIED CAROTID ARTERY: ICD-10-CM

## 2023-12-14 DIAGNOSIS — I67.2 CEREBRAL ATHEROSCLEROSIS: ICD-10-CM

## 2023-12-14 DIAGNOSIS — G45.9 TRANSIENT CEREBRAL ISCHEMIC ATTACK, UNSPECIFIED: ICD-10-CM

## 2023-12-14 DIAGNOSIS — I66.01 OCCLUSION AND STENOSIS OF RIGHT MIDDLE CEREBRAL ARTERY: ICD-10-CM

## 2023-12-14 PROCEDURE — 99215 OFFICE O/P EST HI 40 MIN: CPT

## 2023-12-14 NOTE — DISCUSSION/SUMMARY
[Antithrombotic therapy with ___] : antithrombotic therapy with  [unfilled] [Intensive Blood Pressure Control] : intensive blood pressure control [Lipid Lowering Therapy] : lipid lowering therapy [Patient encouraged to discuss with Primary MD] : I encouraged the patient to discuss these important issues with ~his/her~ primary care doctor [Goals and Counseling] : I have reviewed the goals of stroke risk factor modification. I counseled the patient on measures to reduce stroke risk, including the importance of medication compliance, risk factor control, exercise, healthy diet and avoidance of smoking. I reviewed stroke warning signs and symptoms and appropriate actions to take if such occur. [FreeTextEntry1] : Impression:   1. Few hours of dysarthria, now resolved. Exam notable for L facial droop, corresponding w/ chronic R BG infarct seen on CTH. DDx ischemic stroke, TIA, recrudescence, toxic/infectious/metabolic. He was found to have severe right M1 stenosis with increased flow in M2 segments and was started on SAMMPRIS  2. Chronic small right caudate body and right basal ganglia infarction etiology small vessel disease vs. R MCA stenosis  3. Tiny chronic right cerebellar  lacunar infarction etiology likely small vessel disease  4. Intracranial thero - will monitor with serial TCDS.   Continue ASA 81 mg once daily and Plavix 75 mg once daily for 3 months followed by ASA 81 mg once daily for aggressive secondary stroke reduction. I offered famotidine for his nausea but he insists on taking Plavix every other day. He is aware that is not how medication was prescribed.   Continue to follow up with PCP/cardiology for BP and statin management (goal LDL <70) as well as all routine primary care needs. HbA1c 6.1 LDL 40  Screened patient for sleep apnea with no identifiable risk factors at this time. Will reevaluate next visit. We discussed aggressive vascular strict risk factor control. We discussed repeat imaging but he is not interested at this time. We can readdress MR NOVA to establish baseline at next visit   TCD and Doppler - pending

## 2023-12-14 NOTE — REASON FOR VISIT
[Post Hospitalization] : a post hospitalization visit [Follow-Up: _____] : a [unfilled] follow-up visit [FreeTextEntry1] : TIA

## 2023-12-14 NOTE — PHYSICAL EXAM
[FreeTextEntry1] : Physical Examination:\par  General - NAD\par  Cardiovascular - Peripheral pulses palpable, no edema\par  Eyes - Fundoscopy not performed due to safety precautions in the setting of the COVID-19 pandemic\par  \par  Neurologic Exam:\par  Mental status - Awake, Alert, Oriented to person, place, and time. Speech fluent. Follows simple and complex commands. Attention/concentration, recent and remote memory (including registration and recall), and fund of knowledge intact\par  \par  Cranial nerves - PERRLA, VFF, EOMI, face sensation (V1-V3) intact b/l, mild L facial droop, hearing intact b/l, palate with symmetric elevation, trapezius 5/5 strength b/l, tongue midline on protrusion with full lateral movement\par  \par  Motor - Normal bulk and tone throughout. No pronator drift.\par  Strength testing\par            Deltoid      Biceps      Triceps     Wrist Extension    Wrist Flexion     Interossei       \par  R            5                 5               5                     5                              5                        5                 5\par  L             5                 5               5                     5                              5                        5                 5\par  \par            Hip Flexion    Hip Extension    Knee Flexion    Knee Extension    Dorsiflexion    Plantar Flexion\par  R              5                           5                       5                           5                            5                          5\par  L              5                           5                        5                           5                            5                          5\par  \par  Sensation - Light touch intact throughout\par  \par  DTR's -\par             Biceps      Triceps     Brachioradialis      Patellar    Ankle    Toes/plantar response\par  R             2+             2+                  2+                       2+            2+                 Down\par  L              2+             2+                 2+                        2+           2+                 Down\par  \par  Coordination - Finger to Nose intact b/l. No tremors appreciated\par  \par  Gait and station - Normal casual gait. Romberg (-)

## 2023-12-14 NOTE — HISTORY OF PRESENT ILLNESS
[FreeTextEntry1] : Mr. Jarvis is a 72 year old male PMHx HTN, HLD, DM, CAD s/p CABG (2021) who presents today for post hospitalization follow up after a possible TIA on 5/26/2023  **SEEN BY DR. ZAVALA DURING ADMISSION  Patient MAEVE JARVIS is a 72y (1950) man who presents w/ CC of dyarthria. LKW 2 nights ago (~9pm). He lives alone and reports he woke up at 6am on 5/25 feeling fine. At 3pm, he was speaking to family members and having dysarthria. He was also slower to respond and answer questions. This lasted until 8pm and has resolved now. He denies any associated focal weakness, numbness, blurry vision, dizziness, headaches, difficulty ambulating. He has no family history of neurological disease. He reports having Bell's palsy in the past that affected the R side. He is independent in ADLs at baseline and ambulates w/o assistance devices  MR head: Chronic small right caudate body and right basal ganglia infarctions. Tiny chronic right cerebellar  lacunar infarction. No acute infarcts  CTA head: Severely stenotic appearing M1 segment of the right middle cerebral artery as well as diminutive caliber of the M2 branches.

## 2023-12-16 PROBLEM — Z00.00 GENERAL MEDICAL EXAM: Status: ACTIVE | Noted: 2023-11-24

## 2023-12-16 PROBLEM — E11.9 DIABETES MELLITUS, TYPE 2: Status: ACTIVE | Noted: 2021-10-05

## 2023-12-16 RX ORDER — ASPIRIN 81 MG/1
81 TABLET, COATED ORAL
Qty: 90 | Refills: 2 | Status: ACTIVE | COMMUNITY
Start: 2023-05-01

## 2023-12-16 RX ORDER — METOPROLOL SUCCINATE 50 MG/1
50 TABLET, EXTENDED RELEASE ORAL DAILY
Qty: 90 | Refills: 2 | Status: ACTIVE | COMMUNITY
Start: 2022-10-27

## 2023-12-16 NOTE — HEALTH RISK ASSESSMENT
[de-identified] : walking [de-identified] : balanced [BVY1Opfee] : 0 [Reports changes in hearing] : Reports no changes in hearing [Reports changes in vision] : Reports no changes in vision [Reports normal functional visual acuity (ie: able to read med bottle)] : Reports poor functional visual acuity.  [ColonoscopyComments] : Pt did fit kit - WNL [AdvancecareDate] : 11/24/23

## 2023-12-16 NOTE — HISTORY OF PRESENT ILLNESS
[FreeTextEntry1] : CPE [de-identified] : Mr. JARVIS is a 73 year old male that presents to the office for a CPE.  PMHx: hypertension, hyperlipidemia, type 2 diabetes. Patient had NSTEMI in September 2021, found to have triple-vessel coronary artery disease underwent three-vessel bypass, LIMA to the LAD SVG to OM1 SVG to RPDA. Pt reports feeling well, no complaints Pt tolerating medication well - no adverse effects Continue ASA, metoprolol, atorvastatin, losartan, levothyroxine, metformin Plavix stopped by cardiology EKG 9/20/23: Sinus rhythm, VECs x2 Pt due for labs today Last labs Oct 24, 22: A1C 5.9% Follows w/ neuro Pt takes BP at home daily - averaging around 110 systolic, well controlled Pt diet well controlled - eats a lot of vegetables, daily fruit  128/72 136lb  -Flu - Declined -COVID19 - Declined -TDAP/MMR - Declined -Shingles vaccine - Declined -PNA vaccine - Declined -Colonoscopy - Pt did fit kit, declines colonoscopy

## 2024-02-22 NOTE — ED PROVIDER NOTE - NSTOBACCONEVERSMOKERY/N_GEN_A
American sign language    Nutrition Therapy:  Current Nutrition Therapy:   - Oral Diet:  General    Routes of Feeding: Oral  Liquids: Thin Liquids  Daily Fluid Restriction: no  Last Modified Barium Swallow with Video (Video Swallowing Test):     Treatments at the Time of Hospital Discharge:   Respiratory Treatments:   Oxygen Therapy:    Ventilator:        Rehab Therapies: Physical Therapy and Occupational Therapy  Weight Bearing Status/Restrictions: Non-weight bearing on left leg  Other Medical Equipment (for information only, NOT a DME order):  walker  Other Treatments:   Heart Failure Instructions for Daily Management  Patient was treated for acute on chronic systolic heart failure.  Renato Nagy will require the following:  Please weigh daily on the same scale and approximately the same time of day.  Report weight gain of 3 pounds/day or 5 pounds/week to : Facility MD.  Please use hospital discharge weight as baseline reference.   Please monitor for signs and symptoms of and report to MD:  Worsening Heart Failure: sudden weight gain, shortness of breath, lower extremity or general edema/swelling, abdominal bloating/swelling, inability to lie flat, intolerance to usual activity, or cough (especially at night). Report these finding even if no increase in weight.  Dehydration:  having difficulty or a decrease in urination, dizziness, worsening fatigue, or new onset/worsening of generalized weakness.     Please continue a LOW SODIUM diet and LIMIT fluid intake to 48 - 64 ounces ( 1.5 - 2 liters) per day.   Call Carrie Mead APRN - [phone]} with any questions or concerns.   Please continue heart failure education to patient and family/support system.  See After Visit Summary for hospital follow up appointment details.  Consider Spiritual Care Referral for support and/or completion of advance directives:   [facility] Spiritual Care Services: [phone]  Consider: having the facility 
No

## 2024-03-20 ENCOUNTER — APPOINTMENT (OUTPATIENT)
Dept: CARDIOLOGY | Facility: CLINIC | Age: 74
End: 2024-03-20
Payer: MEDICARE

## 2024-03-20 ENCOUNTER — NON-APPOINTMENT (OUTPATIENT)
Age: 74
End: 2024-03-20

## 2024-03-20 VITALS
HEIGHT: 64 IN | BODY MASS INDEX: 24.41 KG/M2 | WEIGHT: 143 LBS | OXYGEN SATURATION: 97 % | DIASTOLIC BLOOD PRESSURE: 80 MMHG | SYSTOLIC BLOOD PRESSURE: 154 MMHG | HEART RATE: 71 BPM

## 2024-03-20 DIAGNOSIS — E78.5 HYPERLIPIDEMIA, UNSPECIFIED: ICD-10-CM

## 2024-03-20 DIAGNOSIS — I25.10 ATHEROSCLEROTIC HEART DISEASE OF NATIVE CORONARY ARTERY W/OUT ANGINA PECTORIS: ICD-10-CM

## 2024-03-20 DIAGNOSIS — I10 ESSENTIAL (PRIMARY) HYPERTENSION: ICD-10-CM

## 2024-03-20 PROCEDURE — 93000 ELECTROCARDIOGRAM COMPLETE: CPT

## 2024-03-20 PROCEDURE — 99214 OFFICE O/P EST MOD 30 MIN: CPT | Mod: 25

## 2024-03-20 NOTE — ED CLERICAL - NSCLERICAL TASK_GEN_ALL_ED
Pre-Hospital Care Report (PCR) Detail Level: Simple Render Risk Assessment In Note?: no Additional Notes: Given the patient's history of melanoma and chronic actinic damage, the patient carries a risk of recurrence of melanoma and greater than 5% risk of developing a second melanoma over the next 5-10 years which can lead to signifiant morbidity and mortality.  Continued montioring of nevi is recommended. Detail Level: Detailed Render Risk Assessment In Note?: yes Additional Notes: ~ 3/2021 - margins clear at bx, clinical monitoring. Additional Notes: ~ 2013 done in [place]

## 2024-04-17 NOTE — ED PROVIDER NOTE - DISPOSITION TYPE
[facility] Progress Note    Miguel Davila is a 79 year old male who presents to clinic for APN assessment and management of chronic diastolic heart failure and is functional class 2-3.     Subjective:  Since his last clinic visit on 4/5; when he was given 1 dose of Veltassa and was told to hold his Spironolactone over the weekend and hold one dose of Bumex in the evening, he had lab work on 4/8 and his renal function and hyperkalemia improved, was instructed to restart Spironolactone at a lower dose, 12.5mg daily.     Today, his weight is overall stable. He admits to feeling more tired but hasn't been able to wear his CPAP lately due to recent MOHs surgery.  He denies sob, abd bloating or dizziness/LH. His appetite is good.       He saw Dr. Jean who recommended RHC to assess for worsening precapillary PH since PAD has increased on MEMs from prior baseline and BVA suggested he was hypovolemic. RHC is set up for 4/15. She started him on Synthroid due to mildly elevated TSH with plan to check labs in 3 months.     He had a EGD and colonoscopy by Dr. Neves on 4/12 and had shown fungal esophagitis and several polyps. Recommended Nystatin and no further surveillance due to his age and co morbidities.     Due to the post sedation side effects (slept 18 hours) of the GI procedure on 4/12, he cancelled his RHC on 4/15. He will call tomorrow to reschedule.      He saw Dr. Marquez with c/o runny nose felt to be vasomotor rhinitis. Recommended saline and Atrovent spray, if no relief plan for CT sinuses.     He underwent a BVA 11/17 that was suggestive of severe dehydration and anemia. Iron studies revealed iron sat of 27 and ferritin 50.9. CardioMEMs 29 mmhg on day of BVA with weight of 193 lbs.  Since we reduced diuretics and then had to increase them d.t fluid overload.  PAD was 32 mmHg yesterday and has been 29-33 mmHg recently and stable.         Review of Systems    Constitutional: Negative.   Cardiovascular:  Positive for leg swelling (mild).   Respiratory: Negative.     Gastrointestinal: Negative.        HISTORY:  Past Medical History:    A-fib (HCC)    Arrhythmia    atrial fibrillation    Arthritis    Autoimmune disease (HCC)    hepatits, resolved anfd nephritis, resolved    Ramon's esophagus    Bleeding nose    Gums Treated    Blood disorder    thrombocytopenia    Blood in urine    Blurred vision    cataract due to steroids, surgery in June    Calculus of kidney    One time    Cancer (HCC)    skin    Candidiasis of the esophagus    Due to steroid use for Autoimmune disorder     Cataract    Colon polyps    Congestive heart disease (HCC)    Diarrhea, unspecified    Intermittent due to lupus    Diverticulosis of colon    Easy bruising    On and off prednisone for 20 years    Esophageal polyp    Esophageal reflux    Essential hypertension    Fatigue    polymyositis and lupus    Gout    Hammer toe, acquired    Heart palpitations    Afib    Hepatitis    autoimmune induce hepatitis d/t lupus    High blood pressure    IBS (irritable bowel syndrome)    mild d/t lupus    Irregular bowel habits    Leg swelling    Heart failure, probably caused by lupus    Lupus (HCC)    MCTD (mixed connective tissue disease) (HCC)    Obstructive sleep apnea (adult) (pediatric)    LI (obstructive sleep apnea)    AHI 37  Supine AHI 38 non-supine AHI 16 Sao2 Pj 83%     LI (obstructive sleep apnea)    AHI 36 RDI 36 REM AHI 45 Supine AHI 65 non-supine AHI 19 Sao2 Pj 86% CPAP 9cwp    Pain in joints    Personal history of antineoplastic chemotherapy    Pleural effusion    right    Pneumonia due to organism    Polymyositis (HCC)    Presence of other cardiac implants and grafts    watchman filter    Problems with swallowing    dysphagia in 2006    Pulmonary hypertension (HCC)    Raynaud disease    Raynaud disease    Renal disorder    lupus nephritis 2005/2006    Shortness of breath    mainly due to  pm or lupus    Sleep apnea    CPAP    Thrombocytopathia (HCC)    Visual impairment    bifocals for reading & computer; distance for driving    Wears glasses      Past Surgical History:   Procedure Laterality Date    Appendectomy  1970    Appendectomy      Cardiac catheterization  09/2019    Cataract Bilateral     Colonoscopy  10/2003 2006 01/2010    x3    Colonoscopy  5/14/2013    Colonoscopy N/A 5/1/2018    Procedure: COLONOSCOPY;  Surgeon: Isaiah Tobar MD;  Location:  ENDOSCOPY    Colonoscopy N/A 4/12/2024    Procedure: COLONOSCOPY;  Surgeon: Gerardo Neves MD;  Location:  ENDOSCOPY    Colonoscopy with biopsy  5/14/13    Egd      Hand/finger surgery unlisted  2003    right    Needle biopsy liver      Other  12/2005    needle bipsy of kidney    Other surgical history  2017    watchman filter    Percutaneous  angioplasty  (ptca)- pbp only  2006    right    Rectum surgery procedure unlisted  1946    rectal surgery polypectomy    Skin surgery      MMS BCC R temple 6/24/09    Skin surgery  2007    basal ceell carcinoma    Skin surgery  7-18-12    BCC-nodular to right superior eyebrow/ MOHS    Skin surgery  07/15/2013    SCCIS to left superior tragus/MMS    Skin surgery  2-19-14    BCC-NOD to right superior pinna, MMS, AB    Skin surgery  02/16/2021    BCC- left superior forehead, MMS     Skin surgery  03/07/2022    SCC IN SITU RIGHT ANTIHELIX MMS BY DR GASTELUM    Tonsillectomy  1948    Upper gi endoscopy,exam  10/2003 2006 01/2010 , 5/13    x3      Family History   Problem Relation Age of Onset    Heart Disease Father         CHF    Gastro-Intestinal Disorder Father         Diverticulosis    Alcohol and Other Disorders Associated Father     Heart Attack Father     Heart Disease Mother         CHF    Breast Cancer Mother     Stroke Mother     Cancer Paternal Grandfather     Heart Attack Paternal Grandmother     Kidney Disease Son     Other (Ramon's Esophagus) Son     Heart Attack Maternal Grandfather        Social History     Socioeconomic History    Marital status:    Occupational History    Occupation: Retired    Tobacco Use    Smoking status: Former     Current packs/day: 0.00     Average packs/day: 0.5 packs/day for 15.0 years (7.5 ttl pk-yrs)     Types: Cigarettes     Start date: 1958     Quit date: 1973     Years since quittin.7    Smokeless tobacco: Never    Tobacco comments:     5 cigarettes daily, stopped smoking in    Vaping Use    Vaping status: Never Used   Substance and Sexual Activity    Alcohol use: Yes     Alcohol/week: 13.0 - 15.0 standard drinks of alcohol     Types: 5 Glasses of wine, 8 - 10 Standard drinks or equivalent per week     Comment: 1 per day wine or liquor    Drug use: Never   Other Topics Concern    Caffeine Concern Yes     Comment: 1 soda per day and decaf coffee    Sleep Concern No    Exercise Yes     Comment: 3-4 times weekly    Seat Belt Yes           Objective:    Cardiac Rhythm: Atrial fibrillation    /41   Pulse 72   Resp 12   Wt 198 lb 3.2 oz (89.9 kg)   SpO2 100%   BMI 27.64 kg/m²     Wt Readings from Last 6 Encounters:   24 198 lb 3.2 oz (89.9 kg)   24 193 lb (87.5 kg)   24 193 lb (87.5 kg)   24 198 lb 12.8 oz (90.2 kg)   24 198 lb 12.8 oz (90.2 kg)   24 198 lb 3.2 oz (89.9 kg)            Recent Results (from the past 24 hour(s))   Basic Metabolic Panel (8)    Collection Time: 24  3:44 PM   Result Value Ref Range    Glucose 119 (H) 70 - 99 mg/dL    Sodium 141 136 - 145 mmol/L    Potassium 5.0 3.5 - 5.1 mmol/L    Chloride 109 98 - 112 mmol/L    CO2 25.0 21.0 - 32.0 mmol/L    Anion Gap 7 0 - 18 mmol/L    BUN 54 (H) 9 - 23 mg/dL    Creatinine 1.77 (H) 0.70 - 1.30 mg/dL    Calcium, Total 9.6 8.5 - 10.1 mg/dL    Calculated Osmolality 308 (H) 275 - 295 mOsm/kg    eGFR-Cr 38 (L) >=60 mL/min/1.73m2    Patient Fasting for BMP? No          Current Outpatient Medications:     nystatin 723301 UNIT/ML  Mouth/Throat Suspension, Take 5 mL (500,000 Units total) by mouth 4 (four) times daily for 14 days., Disp: 280 mL, Rfl: 0    spironolactone 25 MG Oral Tab, Take 0.5 tablets (12.5 mg total) by mouth daily. Holding for now till potassium is rechecked, Disp: , Rfl:     levothyroxine 50 MCG Oral Tab, Take 1 tablet (50 mcg total) by mouth before breakfast., Disp: , Rfl:     mupirocin 2 % External Ointment, APPLY TWICE DAILY TO SCALP UNTIL WOUND IS HEALED., Disp: , Rfl:     ipratropium 0.06 % Nasal Solution, 1 spray by Nasal route 2 (two) times daily., Disp: 1 each, Rfl: 5    omeprazole 20 MG Oral Capsule Delayed Release, Take 1 capsule (20 mg total) by mouth 2 (two) times daily., Disp: 180 capsule, Rfl: 1    bumetanide 1 MG Oral Tab, Take 2 mg of Bumex daily in the AM. Take 1 mg in the PM on Tuesday, Thursday, Saturday and Sunday. Take 2 mg in the PM on Monday, Wednesday and Friday, Disp: 360 tablet, Rfl: 1    ALLOPURINOL 300 MG Oral Tab, TAKE 1 TABLET BY MOUTH EVERY DAY, Disp: 90 tablet, Rfl: 3    predniSONE 5 MG Oral Tab, Take 1 tablet (5 mg total) by mouth daily with breakfast., Disp: 90 tablet, Rfl: 3    metoprolol succinate  MG Oral Tablet 24 Hr, Take 1 tablet (100 mg total) by mouth every morning AND 0.5 tablets (50 mg total) every evening., Disp: 60 tablet, Rfl: 3    PREDNISONE 1 MG Oral Tab, TAKE 2 TABLETS (2 MG TOTAL) BY MOUTH DAILY WITH BREAKFAST., Disp: 180 tablet, Rfl: 3    sacubitril-valsartan 49-51 MG Oral Tab, Take 1 tablet by mouth 2 (two) times daily., Disp: , Rfl:     aspirin 81 MG Oral Tab EC, Take 1 tablet (81 mg total) by mouth daily., Disp: 30 tablet, Rfl: 0    PREVIDENT 5000 BOOSTER PLUS 1.1 % Dental Paste, , Disp: , Rfl:     Multiple Vitamins-Minerals (TAB-A-KEVIN) Oral Tab, Take 1 tablet by mouth daily., Disp: , Rfl:     Immune Globulin, Human, 10 g Intravenous Recon Soln, Inject 0.4 g/kg into the vein. Given for treatment of polymyositis, mixed connective tissue disease, and immune  thrombocytopenia purpura monthly at [facility]. Every 5 weeks, Disp: 3 each, Rfl: 0    Calcium Citrate-Vitamin D (CITRACAL + D OR), Take 1 tablet by mouth daily., Disp: , Rfl:     Exam:   General:         Alert, in no apparent distress  HEENT:           no JVD with small V waves noted  Lungs:            CTAB                     CV:                   irregularly irregular  Abdomen:       Non-distended, soft, non-tender, BS+  Extremities:    trace-+1 LE edema  Neuro:             A&O x 3  Skin:                Pink, warm, dry    Education:  Patient instructed regarding sodium restricted diet, low sodium foods, fluid restriction, daily weights, medication regimen, s/s HF exacerbation and when to call APN/clinic.    Assessment:   Chronic diastolic, RV heart failure - LVEF 52% and stable with borderline normal RV dysfunction per echo 1/4/2023. RHC 7/2022 with PCWP 20, RA 11. S/p CardioMEMs implanted on 8/2022 with 10 mmhg gradient between his PAD and wedge on RHC, with PAD running higher. Goal thought to be ~ 20 mmhg previously but since goal has been increased with the guidance of clinical exam, renal indices and BVA. Range thought to be closer to 29-35 mmHg. BVA 11/2023 suggestive of dehydration, PAD 29 on day of BVA. PAD 29-33 mmHg recently and stable.  Last ProBNP 2,029 on 3/01 which is improved from 3,502 in January. Off Jardiance, thought to be due to pancreatitis. MRA discontinued at [facility] for dehydration and near syncope; since have restarted but required reduced dose due to hyperkalemia.   PH, mild combined pre and post capillary - RHC 8/2022 demonstrates mPAP 27 with wedge 8 mmHg, RA 5 mmHg, PVR 2.7 and CI 3.4. RV function borderline normal on echo 1/2023. DPG 10 on RHC when MEMs placed. Plan for repeat RHC 4/15.   Moderate MR/Mild-Mod TR/AI - on [facility] echo 1/2023 and unchanged.   Dilated ascending aorta - 4.2 cm per echo 1/2023.  CKD Stage 3b - baseline creatinine ~ 1.8-2.0 mg/dl, stable. Follows with  Dr. Devine.    Chronic Afib - s/p Watchman. Rates controlled.    LI - hasn't  been using CPAP recently d/t MOHs surgery on his head. Plans to restart wearing CPAP soon. This may also be contributing to higher PA pressures.   Recurrent bilateral pleural effusions - hx of thoracentesis.   Hx Lupus/Mixed CTD/Severe polymyositis - continues IVIG infusions every 5 weeks, last dose 3/18. Follows with Dr. Farmer. On chronic prednisone. Off Imuran due to pancytopenia.  Non-obstructive CAD  Chronic thrombocytopenia, immune mediated/recent pancytopenia related to imuran - last PLT 127K. Follows with Dr. Orr, saw her 1/15, no changes made.   Hx Hyponatremia  HERNANDEZ with biopsy proven stage F0-F1 Fibrosis on US 1/2022. Follows with Dr. Veronica, Hepatology at [facility]. Seen 1/2, to follow up in a year. Recent LFT's normal.   Basal/Squamous cell carcinoma - hx Mohs surgery x2.   Mild hyperkalemia - on low dose MRA and ARNI.  Prior visit given Veltassa. CTM.  Anemia - last Hgb 13.5 and stable. Recent iron studies with sat 27 and ferritin 50.9 in November.    Elevated TSH, normal T4. Started on Synthroid per Dr. Jean in March, plan to repeat labs in June.    Plan:   Continue current medications.    Needs to reschedule RHC.  Continue to follow MEMs readings.   Return to clinic in 1 month.  F/U with Dr. Jean as planned  CHF discharge instructions given    40 minutes spent with patient and greater than 50% of the time was spent counseling and coordinating care.    SHEA Fernandez            ADMIT

## 2024-04-21 PROBLEM — I25.10 CAD (CORONARY ARTERY DISEASE): Status: ACTIVE | Noted: 2021-10-05

## 2024-04-21 PROBLEM — E78.5 DYSLIPIDEMIA: Status: ACTIVE | Noted: 2023-03-22

## 2024-04-21 PROBLEM — I10 HYPERTENSION: Status: ACTIVE | Noted: 2023-03-22

## 2024-04-21 NOTE — HISTORY OF PRESENT ILLNESS
[FreeTextEntry1] : 73-year-old male history of hypertension, hyperlipidemia, type 2 diabetes.  Patient had NSTEMI in September 2021, found to have triple-vessel coronary artery disease underwent three-vessel bypass, LIMA to the LAD SVG to OM1 SVG to RPDA.  Ejection fraction.  Was normal.  On today's visit patient reports that he is doing well he is very active and is maintaining a very good healthy active lifestyle without any hindrance from any cardiac symptoms.

## 2024-04-21 NOTE — REVIEW OF SYSTEMS
[Fever] : no fever [Feeling Fatigued] : not feeling fatigued [Blurry Vision] : no blurred vision [Earache] : no earache [SOB] : no shortness of breath [Leg Claudication] : no intermittent leg claudication [Cough] : no cough [Abdominal Pain] : no abdominal pain [Joint Pain] : no joint pain [de-identified] : See HPI

## 2024-04-21 NOTE — DISCUSSION/SUMMARY
[FreeTextEntry1] : CAD, as previously stated patient is very active and no symptoms of angina goal is continuing with aspirin high-dose statin and risk factor modification.  Hypertension, blood pressure is at goal, continue with metoprolol  HLD: I reviewed his lipid profile which is at goal because of his prior history of CAD recommend continuing with high intensity statin therapy.  [EKG obtained to assist in diagnosis and management of assessed problem(s)] : EKG obtained to assist in diagnosis and management of assessed problem(s)

## 2024-04-21 NOTE — PHYSICAL EXAM
[Well Developed] : well developed [Normal Conjunctiva] : normal conjunctiva [Normal Venous Pressure] : normal venous pressure [Normal S1, S2] : normal S1, S2 [Normal] : no edema, no cyanosis, no clubbing, no varicosities [de-identified] : Well-healed surgical wound

## 2024-05-03 LAB
ALBUMIN SERPL ELPH-MCNC: 4.5 G/DL
ALP BLD-CCNC: 85 U/L
ALT SERPL-CCNC: 28 U/L
ANION GAP SERPL CALC-SCNC: 11 MMOL/L
APPEARANCE: CLEAR
AST SERPL-CCNC: 29 U/L
BACTERIA: NEGATIVE /HPF
BILIRUB SERPL-MCNC: 1.4 MG/DL
BILIRUBIN URINE: NEGATIVE
BLOOD URINE: NEGATIVE
BUN SERPL-MCNC: 20 MG/DL
CALCIUM SERPL-MCNC: 9.6 MG/DL
CAST: 0 /LPF
CHLORIDE SERPL-SCNC: 104 MMOL/L
CHOLEST SERPL-MCNC: 105 MG/DL
CO2 SERPL-SCNC: 25 MMOL/L
COLOR: YELLOW
CREAT SERPL-MCNC: 1.18 MG/DL
EGFR: 64 ML/MIN/1.73M2
EPITHELIAL CELLS: 0 /HPF
ESTIMATED AVERAGE GLUCOSE: 143 MG/DL
GLUCOSE QUALITATIVE U: NEGATIVE MG/DL
GLUCOSE SERPL-MCNC: 89 MG/DL
HBA1C MFR BLD HPLC: 6.6 %
HCT VFR BLD CALC: 46.4 %
HDLC SERPL-MCNC: 56 MG/DL
HGB BLD-MCNC: 14.6 G/DL
KETONES URINE: NEGATIVE MG/DL
LDLC SERPL CALC-MCNC: 34 MG/DL
LEUKOCYTE ESTERASE URINE: NEGATIVE
MCHC RBC-ENTMCNC: 25.3 PG
MCHC RBC-ENTMCNC: 31.5 GM/DL
MCV RBC AUTO: 80.4 FL
MICROSCOPIC-UA: NORMAL
NITRITE URINE: NEGATIVE
NONHDLC SERPL-MCNC: 49 MG/DL
PH URINE: 6.5
PLATELET # BLD AUTO: 264 K/UL
POTASSIUM SERPL-SCNC: 4.6 MMOL/L
PROT SERPL-MCNC: 7.6 G/DL
PROTEIN URINE: NEGATIVE MG/DL
PSA FREE FLD-MCNC: 39 %
PSA FREE SERPL-MCNC: 0.13 NG/ML
PSA SERPL-MCNC: 0.32 NG/ML
RBC # BLD: 5.77 M/UL
RBC # FLD: 15.7 %
RED BLOOD CELLS URINE: 0 /HPF
SODIUM SERPL-SCNC: 140 MMOL/L
SPECIFIC GRAVITY URINE: 1
T4 SERPL-MCNC: 9.5 UG/DL
TRIGL SERPL-MCNC: 71 MG/DL
TSH SERPL-ACNC: 5.57 UIU/ML
UROBILINOGEN URINE: 0.2 MG/DL
WBC # FLD AUTO: 8.42 K/UL
WHITE BLOOD CELLS URINE: 0 /HPF

## 2024-06-29 NOTE — ED PROVIDER NOTE - EKG ADDITIONAL QUESTION - PERFORMED INDEPENDENT VISUALIZATION
PT is A/ox4 coming in for left foot pain. Pain stated while sitting at work . PT stated that it hurts to walk on it and the pain is on the top of the foot. No swelling or redness. Denies any injuries  
Yes

## 2024-08-16 ENCOUNTER — APPOINTMENT (OUTPATIENT)
Dept: INTERNAL MEDICINE | Facility: CLINIC | Age: 74
End: 2024-08-16

## 2024-08-16 VITALS
TEMPERATURE: 98.1 F | WEIGHT: 143 LBS | SYSTOLIC BLOOD PRESSURE: 122 MMHG | HEIGHT: 64 IN | OXYGEN SATURATION: 96 % | BODY MASS INDEX: 24.41 KG/M2 | HEART RATE: 73 BPM | DIASTOLIC BLOOD PRESSURE: 82 MMHG

## 2024-08-16 DIAGNOSIS — B07.0 PLANTAR WART: ICD-10-CM

## 2024-08-16 PROCEDURE — 99213 OFFICE O/P EST LOW 20 MIN: CPT

## 2024-08-16 NOTE — HISTORY OF PRESENT ILLNESS
[FreeTextEntry1] : Follow up, foot/toe pain [de-identified] : Mr. JARVIS is a 74 year old male who presents to the office for follow up:  Pt reports some pain of the left foot and toes when ambulating - has been going on for several months It is progressing Pt denies any known injury or trauma to the foot Pain is present first this in AM No pain when patient is sitting/non-weight bearing Sock and shoes/cushioning helps  No numbness, tingling    122/82 143lb

## 2024-09-03 ENCOUNTER — RX RENEWAL (OUTPATIENT)
Age: 74
End: 2024-09-03

## 2024-09-06 ENCOUNTER — RX RENEWAL (OUTPATIENT)
Age: 74
End: 2024-09-06

## 2024-09-18 ENCOUNTER — NON-APPOINTMENT (OUTPATIENT)
Age: 74
End: 2024-09-18

## 2024-09-18 ENCOUNTER — APPOINTMENT (OUTPATIENT)
Dept: CARDIOLOGY | Facility: CLINIC | Age: 74
End: 2024-09-18

## 2024-09-18 VITALS
WEIGHT: 143 LBS | BODY MASS INDEX: 24.41 KG/M2 | HEIGHT: 64 IN | SYSTOLIC BLOOD PRESSURE: 146 MMHG | HEART RATE: 65 BPM | DIASTOLIC BLOOD PRESSURE: 82 MMHG | OXYGEN SATURATION: 98 %

## 2024-09-18 PROCEDURE — 93000 ELECTROCARDIOGRAM COMPLETE: CPT

## 2024-09-18 PROCEDURE — 99214 OFFICE O/P EST MOD 30 MIN: CPT | Mod: 25

## 2024-09-20 ENCOUNTER — RX RENEWAL (OUTPATIENT)
Age: 74
End: 2024-09-20

## 2024-11-01 NOTE — PROGRESS NOTE ADULT - PROBLEM SELECTOR PLAN 2
[facility] to [place] [id number] [facility]  [facility] [address]  [phone]   HSQ TID

## 2024-11-26 ENCOUNTER — APPOINTMENT (OUTPATIENT)
Dept: INTERNAL MEDICINE | Facility: CLINIC | Age: 74
End: 2024-11-26
Payer: MEDICARE

## 2024-11-26 VITALS
OXYGEN SATURATION: 98 % | HEIGHT: 64 IN | HEART RATE: 78 BPM | WEIGHT: 143 LBS | DIASTOLIC BLOOD PRESSURE: 84 MMHG | BODY MASS INDEX: 24.41 KG/M2 | TEMPERATURE: 97.9 F | SYSTOLIC BLOOD PRESSURE: 130 MMHG

## 2024-11-26 DIAGNOSIS — I10 ESSENTIAL (PRIMARY) HYPERTENSION: ICD-10-CM

## 2024-11-26 DIAGNOSIS — Z95.1 PRESENCE OF AORTOCORONARY BYPASS GRAFT: ICD-10-CM

## 2024-11-26 DIAGNOSIS — I25.10 ATHEROSCLEROTIC HEART DISEASE OF NATIVE CORONARY ARTERY W/OUT ANGINA PECTORIS: ICD-10-CM

## 2024-11-26 DIAGNOSIS — E11.9 TYPE 2 DIABETES MELLITUS W/OUT COMPLICATIONS: ICD-10-CM

## 2024-11-26 DIAGNOSIS — E78.5 HYPERLIPIDEMIA, UNSPECIFIED: ICD-10-CM

## 2024-11-26 DIAGNOSIS — Z00.00 ENCOUNTER FOR GENERAL ADULT MEDICAL EXAMINATION W/OUT ABNORMAL FINDINGS: ICD-10-CM

## 2024-11-26 LAB
ALBUMIN SERPL ELPH-MCNC: 4.5 G/DL
ALP BLD-CCNC: 90 U/L
ALT SERPL-CCNC: 20 U/L
ANION GAP SERPL CALC-SCNC: 14 MMOL/L
APPEARANCE: CLEAR
AST SERPL-CCNC: 23 U/L
BACTERIA: NEGATIVE /HPF
BILIRUB SERPL-MCNC: 1.6 MG/DL
BILIRUBIN URINE: NEGATIVE
BLOOD URINE: NEGATIVE
BUN SERPL-MCNC: 18 MG/DL
CALCIUM SERPL-MCNC: 9.6 MG/DL
CAST: 0 /LPF
CHLORIDE SERPL-SCNC: 100 MMOL/L
CHOLEST SERPL-MCNC: 126 MG/DL
CO2 SERPL-SCNC: 25 MMOL/L
COLOR: YELLOW
CREAT SERPL-MCNC: 1.17 MG/DL
EGFR: 65 ML/MIN/1.73M2
EPITHELIAL CELLS: 0 /HPF
ESTIMATED AVERAGE GLUCOSE: 151 MG/DL
GLUCOSE QUALITATIVE U: NEGATIVE MG/DL
GLUCOSE SERPL-MCNC: 140 MG/DL
HBA1C MFR BLD HPLC: 6.9 %
HCT VFR BLD CALC: 46.4 %
HDLC SERPL-MCNC: 53 MG/DL
HGB BLD-MCNC: 15 G/DL
KETONES URINE: NEGATIVE MG/DL
LDLC SERPL CALC-MCNC: 59 MG/DL
LEUKOCYTE ESTERASE URINE: NEGATIVE
MCHC RBC-ENTMCNC: 25.5 PG
MCHC RBC-ENTMCNC: 32.3 G/DL
MCV RBC AUTO: 78.8 FL
MICROSCOPIC-UA: NORMAL
NITRITE URINE: NEGATIVE
NONHDLC SERPL-MCNC: 72 MG/DL
PH URINE: 6
PLATELET # BLD AUTO: 287 K/UL
POTASSIUM SERPL-SCNC: 4.4 MMOL/L
PROT SERPL-MCNC: 7.9 G/DL
PROTEIN URINE: 30 MG/DL
PSA FREE FLD-MCNC: 38 %
PSA FREE SERPL-MCNC: 0.13 NG/ML
PSA SERPL-MCNC: 0.35 NG/ML
RBC # BLD: 5.89 M/UL
RBC # FLD: 16.9 %
RED BLOOD CELLS URINE: 0 /HPF
SODIUM SERPL-SCNC: 139 MMOL/L
SPECIFIC GRAVITY URINE: 1.01
TRIGL SERPL-MCNC: 66 MG/DL
TSH SERPL-ACNC: 7.07 UIU/ML
UROBILINOGEN URINE: 0.2 MG/DL
WBC # FLD AUTO: 8.89 K/UL
WHITE BLOOD CELLS URINE: 0 /HPF

## 2024-11-26 PROCEDURE — G0439: CPT

## 2025-01-17 ENCOUNTER — RX RENEWAL (OUTPATIENT)
Age: 75
End: 2025-01-17

## 2025-03-19 ENCOUNTER — NON-APPOINTMENT (OUTPATIENT)
Age: 75
End: 2025-03-19

## 2025-03-19 ENCOUNTER — APPOINTMENT (OUTPATIENT)
Dept: CARDIOLOGY | Facility: CLINIC | Age: 75
End: 2025-03-19
Payer: MEDICARE

## 2025-03-19 VITALS
DIASTOLIC BLOOD PRESSURE: 78 MMHG | OXYGEN SATURATION: 98 % | HEART RATE: 70 BPM | RESPIRATION RATE: 16 BRPM | WEIGHT: 148 LBS | SYSTOLIC BLOOD PRESSURE: 145 MMHG | HEIGHT: 64 IN | BODY MASS INDEX: 25.27 KG/M2 | TEMPERATURE: 97.8 F

## 2025-03-19 DIAGNOSIS — I10 ESSENTIAL (PRIMARY) HYPERTENSION: ICD-10-CM

## 2025-03-19 DIAGNOSIS — I25.10 ATHEROSCLEROTIC HEART DISEASE OF NATIVE CORONARY ARTERY W/OUT ANGINA PECTORIS: ICD-10-CM

## 2025-03-19 PROCEDURE — 99214 OFFICE O/P EST MOD 30 MIN: CPT | Mod: 25

## 2025-03-19 PROCEDURE — 93000 ELECTROCARDIOGRAM COMPLETE: CPT

## 2025-09-12 ENCOUNTER — RX RENEWAL (OUTPATIENT)
Age: 75
End: 2025-09-12